# Patient Record
Sex: MALE | Race: BLACK OR AFRICAN AMERICAN | NOT HISPANIC OR LATINO | URBAN - METROPOLITAN AREA
[De-identification: names, ages, dates, MRNs, and addresses within clinical notes are randomized per-mention and may not be internally consistent; named-entity substitution may affect disease eponyms.]

---

## 2019-11-01 ENCOUNTER — CLINICAL SUPPORT (OUTPATIENT)
Dept: URGENT CARE | Facility: CLINIC | Age: 54
End: 2019-11-01

## 2019-11-01 DIAGNOSIS — Z02.89 ENCOUNTER FOR PHYSICAL EXAMINATION RELATED TO EMPLOYMENT: ICD-10-CM

## 2019-11-01 PROCEDURE — 99499 UNLISTED E&M SERVICE: CPT | Mod: S$GLB,,, | Performed by: EMERGENCY MEDICINE

## 2019-11-01 PROCEDURE — 99499 PR PHYSICAL - DOT/CDL: ICD-10-PCS | Mod: S$GLB,,, | Performed by: EMERGENCY MEDICINE

## 2019-11-20 NOTE — PROGRESS NOTES
This note was created by combination of typed  and M-Modal dictation.  Transcription errors may be present.  If there are any questions, please contact me.    Assessment & Plan:   Coronary artery disease involving native coronary artery of native heart with angina pectoris  -history of coronary artery disease status post stenting, lost to follow-up.  At the time of discharge he was not started on ACE-inhibitor, Cardiology did not feel it necessary at that time.  He is on a beta-blocker on statin, was on dual antiplatelet therapy.  He has stop the aspirin and stay on Plavix.  He can stop the Plavix and stay on aspirin  Refilled statin and metoprolol, referral to Cardiology for follow-up.  -     Ambulatory referral to Cardiology  -     aspirin (ECOTRIN) 81 MG EC tablet; Take 1 tablet (81 mg total) by mouth once daily.  Dispense: 30 tablet; Refill: 11  -     atorvastatin (LIPITOR) 40 MG tablet; Take 1 tablet (40 mg total) by mouth every evening.  Dispense: 90 tablet; Refill: 3  -     metoprolol succinate (TOPROL-XL) 25 MG 24 hr tablet; Take 1 tablet (25 mg total) by mouth once daily.  Dispense: 90 tablet; Refill: 3    Screening for colon cancer  Blood in stool  -reports positive fit kit, referral for colonoscopy  -     Case request GI: COLONOSCOPY    Smoker 1 PPD  -he and his fiancee both smoke, she is more committed to cessation at this time.  He has nicotine replacement therapy at home and advised him to set a quit date and try and quit together.  Nicotine patch and gum together has better quit rates then each individual.  -     CBC auto differential; Future; Expected date: 11/21/2019    Essential hypertension  -better on repeat.  No change, Toprol XL, HCTZ  -     CBC auto differential; Future; Expected date: 11/21/2019  -     metoprolol succinate (TOPROL-XL) 25 MG 24 hr tablet; Take 1 tablet (25 mg total) by mouth once daily.  Dispense: 90 tablet; Refill: 3  -     hydroCHLOROthiazide (HYDRODIURIL) 25 MG  tablet; TK 1 T PO ONE TIME PER DAY FOR BP  Dispense: 90 tablet; Refill: 3    Dyslipidemia  -fasting, check lipid profile on Lipitor 40  -     CBC auto differential; Future; Expected date: 11/21/2019  -     Comprehensive metabolic panel; Future; Expected date: 11/21/2019  -     Lipid panel; Future; Expected date: 11/21/2019  -     TSH; Future; Expected date: 11/21/2019  -     atorvastatin (LIPITOR) 40 MG tablet; Take 1 tablet (40 mg total) by mouth every evening.  Dispense: 90 tablet; Refill: 3    Need for hepatitis C screening test  -     Hepatitis C antibody; Future; Expected date: 11/21/2019    Cough  -smoker.  Denies constant sinus congestion/postnasal drip.  Denies dyspnea.  Nonproductive.  Check chest x-ray.  Denies dyspnea on exertion IE no symptomatic COPD symptoms  Work on smoking cessation.  -     X-Ray Chest PA And Lateral; Future; Expected date: 11/21/2019        Medications Discontinued During This Encounter   Medication Reason    clopidogrel (PLAVIX) 75 mg tablet Therapy completed    aspirin (ECOTRIN) 81 MG EC tablet Reorder    atorvastatin (LIPITOR) 40 MG tablet Reorder    metoprolol succinate (TOPROL-XL) 25 MG 24 hr tablet Reorder    hydroCHLOROthiazide (HYDRODIURIL) 25 MG tablet Reorder       meds sent this encounter:  Medications Ordered This Encounter   Medications    aspirin (ECOTRIN) 81 MG EC tablet     Sig: Take 1 tablet (81 mg total) by mouth once daily.     Dispense:  30 tablet     Refill:  11    atorvastatin (LIPITOR) 40 MG tablet     Sig: Take 1 tablet (40 mg total) by mouth every evening.     Dispense:  90 tablet     Refill:  3    hydroCHLOROthiazide (HYDRODIURIL) 25 MG tablet     Sig: TK 1 T PO ONE TIME PER DAY FOR BP     Dispense:  90 tablet     Refill:  3    metoprolol succinate (TOPROL-XL) 25 MG 24 hr tablet     Sig: Take 1 tablet (25 mg total) by mouth once daily.     Dispense:  90 tablet     Refill:  3       Follow Up: No follow-ups on file. OV 6 months. In the interim,  cardiology and colonoscopy    Subjective:     Chief Complaint   Patient presents with    Establish Care    Arm Pain    Cough       HPI  Luis Felipe is a 54 y.o. male, last appointment with this clinic was Visit date not found.    NP; prior Raegan Duenas with [facility]    Here with his marilee.     CAD/MI 12/2013 12/09/2013 left heart catheterization 95% stenosis midLCx, s/p stenting with premus element stent  12/10/2013 TTE LV normal size, systolic function normal.  LVEF 55-60%.  Normal diastolic function.  Smoker    Reviewed meds - taking plavix not ASA  On beta blocker  On statin  Lost to follow up with cardiology. And he would like to re-establish. Taking his meds - statin, hctz, metoprolol, taking plavix, not taking ASA.  No chest pain no dyspnea on exertion.     Intermittent cough. Smoker. 1 PPD. 20 years. Has nicotine patch at home.  Marilee smokes as well. She's ready to quit. He seems more in precontemplative stage. Rarely productive.     Reported positive iFOBT. Was referred to [facility]. Was without insurance. Never got the colonoscopy.     Recent injury on the job. Left shoulder. Discussed with him that b/c this occurred at work, he should proceed through workmen's compensation for further evaluation.     for [employer].    Patient Care Team:  El Carmona MD as PCP - General (Internal Medicine)  Raegan Duenas MD (Family Medicine)    Patient Active Problem List    Diagnosis Date Noted    Smoker 1 PPD 11/21/2019    Essential hypertension 11/21/2019    Dyslipidemia 11/21/2019    Coronary artery disease involving native coronary artery of native heart with angina pectoris 12/09/2013       PAST MEDICAL HISTORY:  Past Medical History:   Diagnosis Date    Hypertension        PAST SURGICAL HISTORY:  History reviewed. No pertinent surgical history.    Family History   Problem Relation Age of Onset    Peripheral vascular disease Mother     Heart disease Father     No Known  Problems Brother     No Known Problems Brother     No Known Problems Son     No Known Problems Son     No Known Problems Daughter     No Known Problems Daughter     No Known Problems Daughter        SOCIAL HISTORY:  Social History     Socioeconomic History    Marital status: Single     Spouse name: Not on file    Number of children: Not on file    Years of education: Not on file    Highest education level: Not on file   Occupational History    Occupation:      Employer: [employer]   Social Needs    Financial resource strain: Not on file    Food insecurity:     Worry: Not on file     Inability: Not on file    Transportation needs:     Medical: Not on file     Non-medical: Not on file   Tobacco Use    Smoking status: Current Every Day Smoker     Packs/day: 1.00     Years: 20.00     Pack years: 20.00     Types: Cigarettes    Smokeless tobacco: Never Used   Substance and Sexual Activity    Alcohol use: Yes     Alcohol/week: 3.0 standard drinks     Types: 3 Shots of liquor per week     Frequency: 2-4 times a month    Drug use: No    Sexual activity: Yes     Partners: Female   Lifestyle    Physical activity:     Days per week: Not on file     Minutes per session: Not on file    Stress: Not on file   Relationships    Social connections:     Talks on phone: Not on file     Gets together: Not on file     Attends Orthodox service: Not on file     Active member of club or organization: Not on file     Attends meetings of clubs or organizations: Not on file     Relationship status: Not on file   Other Topics Concern    Not on file   Social History Narrative    Not on file       ALLERGIES AND MEDICATIONS: updated and reviewed.  Review of patient's allergies indicates:  No Known Allergies  Current Outpatient Medications   Medication Sig Dispense Refill    atorvastatin (LIPITOR) 40 MG tablet Take 1 tablet (40 mg total) by mouth every evening. 30 tablet 11    hydroCHLOROthiazide  "(HYDRODIURIL) 25 MG tablet TK 1 T PO ONE TIME PER DAY FOR BP  1    metoprolol succinate (TOPROL-XL) 25 MG 24 hr tablet Take 1 tablet (25 mg total) by mouth once daily. 30 tablet 11    aspirin (ECOTRIN) 81 MG EC tablet Take 1 tablet (81 mg total) by mouth once daily. (Patient not taking: Reported on 11/21/2019) 30 tablet 11    clopidogrel (PLAVIX) 75 mg tablet Take 1 tablet (75 mg total) by mouth once daily. (Patient not taking: Reported on 11/21/2019) 30 tablet 11     No current facility-administered medications for this visit.        Review of Systems   Constitutional: Negative for chills and fever.   Respiratory: Positive for cough. Negative for hemoptysis, sputum production, shortness of breath and wheezing.    Cardiovascular: Negative for chest pain and palpitations.   Gastrointestinal: Negative for abdominal pain.   Genitourinary: Negative for dysuria.       Objective:   Physical Exam   Vitals:    11/21/19 1045   Weight: 94.8 kg (208 lb 15.9 oz)   Height: 5' 8" (1.727 m)    Body mass index is 31.78 kg/m².  Weight: 94.8 kg (208 lb 15.9 oz)   Height: 5' 8" (172.7 cm)     Physical Exam   Constitutional: He is oriented to person, place, and time. He appears well-developed and well-nourished. No distress.   Eyes: EOM are normal.   Neck: No thyromegaly present.   Cardiovascular: Normal rate, regular rhythm and normal heart sounds.   No murmur heard.  Pulmonary/Chest: Effort normal and breath sounds normal.   Musculoskeletal: He exhibits no edema or deformity.   Lymphadenopathy:     He has no cervical adenopathy.   Neurological: He is alert and oriented to person, place, and time. Coordination normal.   Skin: Skin is warm and dry.   Psychiatric: He has a normal mood and affect. His behavior is normal. Thought content normal.     "

## 2019-11-21 ENCOUNTER — HOSPITAL ENCOUNTER (OUTPATIENT)
Dept: RADIOLOGY | Facility: HOSPITAL | Age: 54
Discharge: HOME | End: 2019-11-21
Attending: INTERNAL MEDICINE
Payer: COMMERCIAL

## 2019-11-21 ENCOUNTER — OFFICE VISIT (OUTPATIENT)
Dept: FAMILY MEDICINE | Facility: CLINIC | Age: 54
End: 2019-11-21
Payer: COMMERCIAL

## 2019-11-21 VITALS
DIASTOLIC BLOOD PRESSURE: 80 MMHG | OXYGEN SATURATION: 96 % | BODY MASS INDEX: 31.67 KG/M2 | SYSTOLIC BLOOD PRESSURE: 130 MMHG | TEMPERATURE: 98 F | HEIGHT: 68 IN | WEIGHT: 209 LBS | HEART RATE: 79 BPM

## 2019-11-21 DIAGNOSIS — I25.119 CORONARY ARTERY DISEASE INVOLVING NATIVE CORONARY ARTERY OF NATIVE HEART WITH ANGINA PECTORIS: Primary | ICD-10-CM

## 2019-11-21 DIAGNOSIS — Z11.59 NEED FOR HEPATITIS C SCREENING TEST: ICD-10-CM

## 2019-11-21 DIAGNOSIS — R05.9 COUGH: ICD-10-CM

## 2019-11-21 DIAGNOSIS — E78.5 DYSLIPIDEMIA: ICD-10-CM

## 2019-11-21 DIAGNOSIS — F17.200 SMOKER: ICD-10-CM

## 2019-11-21 DIAGNOSIS — K92.1 BLOOD IN STOOL: ICD-10-CM

## 2019-11-21 DIAGNOSIS — Z12.11 SCREENING FOR COLON CANCER: ICD-10-CM

## 2019-11-21 DIAGNOSIS — I10 ESSENTIAL HYPERTENSION: ICD-10-CM

## 2019-11-21 PROCEDURE — 99999 PR PBB SHADOW E&M-EST. PATIENT-LVL IV: ICD-10-PCS | Mod: PBBFAC,,, | Performed by: INTERNAL MEDICINE

## 2019-11-21 PROCEDURE — 71046 XR CHEST PA AND LATERAL: ICD-10-PCS | Mod: 26,,, | Performed by: RADIOLOGY

## 2019-11-21 PROCEDURE — 99204 OFFICE O/P NEW MOD 45 MIN: CPT | Mod: S$GLB,,, | Performed by: INTERNAL MEDICINE

## 2019-11-21 PROCEDURE — 99204 PR OFFICE/OUTPT VISIT, NEW, LEVL IV, 45-59 MIN: ICD-10-PCS | Mod: S$GLB,,, | Performed by: INTERNAL MEDICINE

## 2019-11-21 PROCEDURE — 99999 PR PBB SHADOW E&M-EST. PATIENT-LVL IV: CPT | Mod: PBBFAC,,, | Performed by: INTERNAL MEDICINE

## 2019-11-21 PROCEDURE — 71046 X-RAY EXAM CHEST 2 VIEWS: CPT | Mod: TC,FY,PO

## 2019-11-21 PROCEDURE — 71046 X-RAY EXAM CHEST 2 VIEWS: CPT | Mod: 26,,, | Performed by: RADIOLOGY

## 2019-11-21 RX ORDER — HYDROCHLOROTHIAZIDE 25 MG/1
TABLET ORAL
Qty: 90 TABLET | Refills: 3 | Status: SHIPPED | OUTPATIENT
Start: 2019-11-21 | End: 2020-12-11 | Stop reason: SDUPTHER

## 2019-11-21 RX ORDER — HYDROCHLOROTHIAZIDE 25 MG/1
TABLET ORAL
Refills: 1 | COMMUNITY
Start: 2019-08-23 | End: 2019-11-21 | Stop reason: SDUPTHER

## 2019-11-21 RX ORDER — METOPROLOL SUCCINATE 25 MG/1
25 TABLET, EXTENDED RELEASE ORAL DAILY
Qty: 90 TABLET | Refills: 3 | Status: SHIPPED | OUTPATIENT
Start: 2019-11-21 | End: 2020-11-20

## 2019-11-21 RX ORDER — ASPIRIN 81 MG/1
81 TABLET ORAL DAILY
Qty: 30 TABLET | Refills: 11 | Status: SHIPPED | OUTPATIENT
Start: 2019-11-21 | End: 2020-11-20

## 2019-11-21 RX ORDER — ATORVASTATIN CALCIUM 40 MG/1
40 TABLET, FILM COATED ORAL NIGHTLY
Qty: 90 TABLET | Refills: 3 | Status: SHIPPED | OUTPATIENT
Start: 2019-11-21 | End: 2020-03-06

## 2019-11-22 ENCOUNTER — TELEPHONE (OUTPATIENT)
Dept: FAMILY MEDICINE | Facility: CLINIC | Age: 54
End: 2019-11-22

## 2019-11-22 ENCOUNTER — PES CALL (OUTPATIENT)
Dept: ADMINISTRATIVE | Facility: CLINIC | Age: 54
End: 2019-11-22

## 2019-11-22 DIAGNOSIS — E11.9 TYPE 2 DIABETES MELLITUS WITHOUT COMPLICATION, WITHOUT LONG-TERM CURRENT USE OF INSULIN: ICD-10-CM

## 2019-11-22 DIAGNOSIS — R91.8 HILAR MASS: Primary | ICD-10-CM

## 2019-11-22 DIAGNOSIS — R73.09 ABNORMAL GLUCOSE: Primary | ICD-10-CM

## 2019-11-22 NOTE — TELEPHONE ENCOUNTER
I left a voice message on his listed cell phone asking him to call back.  His chest x-ray was abnormal and I need to get a CT scan to see what it is.  I can't say what it is at this time, if it is an infection or something else.  His blood sugars were high.  I checked a hemoglobin A1c, a three-month average of his blood sugars and it was high, in the diabetes range.  I do know if his previous doctor had mentioned that to him or not.        When the patient is notified I will order the CT scan and I will refer him to diabetes education    Spoke with the pt and inform him of the recommendations.  Patient didn't have any questions.  Patient verbalized understandings.

## 2019-11-22 NOTE — TELEPHONE ENCOUNTER
I left a voice message on his listed cell phone asking him to call back.  His chest x-ray was abnormal and I need to get a CT scan to see what it is.  I can't say what it is at this time, if it is an infection or something else.  His blood sugars were high.  I checked a hemoglobin A1c, a three-month average of his blood sugars and it was high, in the diabetes range.  I do know if his previous doctor had mentioned that to him or not.      When the patient is notified I will order the CT scan and I will refer him to diabetes education.

## 2019-11-22 NOTE — PROGRESS NOTES
There is a soft tissue mass in the anterior segment of the right upper lobe that measures at least 4 cm in cranial-caudad extent as seen on lateral view.  Coronal extent is at least 4.5 cm on PA view; on PA view the lesion overlies the right hilum and right inter lobar artery.   no left pulmonary disease    Will need CT with IV contrast. Will notify pt

## 2019-11-27 ENCOUNTER — TELEPHONE (OUTPATIENT)
Dept: FAMILY MEDICINE | Facility: CLINIC | Age: 54
End: 2019-11-27

## 2019-11-27 NOTE — LETTER
November 27, 2019    Luis Felipe Hawk  [address]  [address] [place] [address]             [facility]  [address] [place] [address]  Phone: [phone]  Fax: [phone] Dear Mr. Hawk:    I have been unable to reach you by phone for your appointment to Cardiology. Please call me at the clinic [phone] to book your appointment.      If you have any questions or concerns, please don't hesitate to call.    Sincerely,        Margaux Acosta MA

## 2019-11-29 ENCOUNTER — TELEPHONE (OUTPATIENT)
Dept: FAMILY MEDICINE | Facility: CLINIC | Age: 54
End: 2019-11-29

## 2019-11-29 NOTE — TELEPHONE ENCOUNTER
----- Message from El Carmona MD sent at 11/29/2019  3:41 PM CST -----  Regarding: did pt schedule CT scan yet?   Needs CT chest, was ordered

## 2019-12-06 DIAGNOSIS — Z12.11 COLON CANCER SCREENING: ICD-10-CM

## 2019-12-06 DIAGNOSIS — E11.9 TYPE 2 DIABETES MELLITUS WITHOUT COMPLICATION: ICD-10-CM

## 2019-12-10 ENCOUNTER — TELEPHONE (OUTPATIENT)
Dept: FAMILY MEDICINE | Facility: CLINIC | Age: 54
End: 2019-12-10

## 2019-12-10 DIAGNOSIS — E11.9 TYPE 2 DIABETES MELLITUS WITHOUT COMPLICATION, WITHOUT LONG-TERM CURRENT USE OF INSULIN: Primary | ICD-10-CM

## 2019-12-10 NOTE — TELEPHONE ENCOUNTER
----- Message from Meghankristie Lamarfield sent at 12/10/2019  3:32 PM CST -----  Contact: Annabelle (Linda)  Name of Who is Calling: Annabelle Monsalve)      What is the request in detail: Would like to speak to staff in regards to wanting endocrinology referral sent over to Dr. Johnson's office. States the referral has to have Dr. Carmona's signature, diagnosis, and demographics sheet; Fax number: [phone]. Please advise.      Can the clinic reply by [facility]: No      What Number to Call Back if not in [facility]: [phone]; alt number: [phone]

## 2019-12-11 NOTE — TELEPHONE ENCOUNTER
----- Message from El Carmona MD sent at 12/10/2019  3:09 PM CST -----  Regarding: has he scheduled CT scan?  I had ordered CT scan.  Did he get it scheduled? Very important

## 2019-12-11 NOTE — TELEPHONE ENCOUNTER
Patient was advised that referral was faxed.    Wife stated that she is currently talking to the insurance company to see what place is covered to do CT scan.

## 2019-12-19 ENCOUNTER — TELEPHONE (OUTPATIENT)
Dept: FAMILY MEDICINE | Facility: CLINIC | Age: 54
End: 2019-12-19

## 2019-12-19 NOTE — TELEPHONE ENCOUNTER
Patient's wife was calling for refill on Plavix. Informed patient's wife; according to office visit in November 2019; Plavix was stopped and patient is not taking aspirin 81 mg daily.

## 2019-12-19 NOTE — TELEPHONE ENCOUNTER
----- Message from Radha Chicas sent at 12/19/2019 10:31 AM CST -----  Contact: pt wife. Annabelle  Type: Patient Call Back    Who called:annabelle    What is the request in detail:pt was told to call if he needs medication    Can the clinic reply by [facility]?no    Would the patient rather a call back or a response via [facility]? call    Best call back number:[phone]

## 2019-12-20 DIAGNOSIS — Z12.11 COLON CANCER SCREENING: Primary | ICD-10-CM

## 2020-01-20 RX ORDER — POLYETHYLENE GLYCOL 3350, SODIUM SULFATE ANHYDROUS, SODIUM BICARBONATE, SODIUM CHLORIDE, POTASSIUM CHLORIDE 236; 22.74; 6.74; 5.86; 2.97 G/4L; G/4L; G/4L; G/4L; G/4L
4 POWDER, FOR SOLUTION ORAL ONCE
Qty: 4000 ML | Refills: 0 | Status: SHIPPED | OUTPATIENT
Start: 2020-01-20 | End: 2020-01-20

## 2020-01-30 ENCOUNTER — TELEPHONE (OUTPATIENT)
Dept: FAMILY MEDICINE | Facility: CLINIC | Age: 55
End: 2020-01-30

## 2020-01-30 NOTE — TELEPHONE ENCOUNTER
----- Message from Lamont Thompson sent at 1/30/2020 10:08 AM CST -----  Contact: Self  Type:  Pre-Op Appt    Patient is requesting a pre op appt.      Name of Caller:  Self    Preferred Appt Date and Time:  02/03/2020    When is the surgery date? 02/05/2020    Type of Surgery: Rotator cuff    Would the patient rather a call back or a response via [facility]? Call     Best Call Back Number:  [phone]

## 2020-01-31 DIAGNOSIS — E11.9 TYPE 2 DIABETES MELLITUS WITHOUT COMPLICATION, UNSPECIFIED WHETHER LONG TERM INSULIN USE: ICD-10-CM

## 2020-02-03 ENCOUNTER — DOCUMENTATION ONLY (OUTPATIENT)
Dept: FAMILY MEDICINE | Facility: CLINIC | Age: 55
End: 2020-02-03

## 2020-02-04 ENCOUNTER — TELEPHONE (OUTPATIENT)
Dept: FAMILY MEDICINE | Facility: CLINIC | Age: 55
End: 2020-02-04

## 2020-02-04 NOTE — TELEPHONE ENCOUNTER
----- Message from Shyann Krystina sent at 2/4/2020  1:00 PM CST -----  Type: Patient Call Back    Who called: Sil Nurse  for Workman's comp    What is the request in detail: Rep asking for a call back to discuss pt's lab work and possible pre op appt if needed.     Can the clinic reply by [facility]? No     Would the patient rather a call back or a response via [facility]? Call back     Best call back number: [phone]    Additional Information:

## 2020-02-04 NOTE — PROGRESS NOTES
I received notice that the patient is planning to undergo left shoulder arthroscopy February 5th through Dr. Kayode Garvey.      There asking for preoperative clearance  I spoke with the wife.  Unfortunate because this is workman's compensation, he was due to see me for visit today but had to cancel because it was not covered.  She spoke with workman's compensation and they will make arrangements for him to see internal Medicine/Family Medicine for preoperative risk stratification.  The surgery has been postponed.

## 2020-02-08 ENCOUNTER — TELEPHONE (OUTPATIENT)
Dept: FAMILY MEDICINE | Facility: CLINIC | Age: 55
End: 2020-02-08

## 2020-02-08 NOTE — TELEPHONE ENCOUNTER
----- Message from Mitesh Jackson sent at 2/7/2020  4:47 PM CST -----  Contact: Nohemi- Case Management worker  Type: Patient Call Back    Who called: Sil    What is the request in detail: She just needs confirmation a fax was received for patient. Please advise.    Can the clinic reply by [facility]? No    Would the patient rather a call back or a response via [facility]? Call    Best call back number: [phone]    Additional Information: N/A

## 2020-02-13 ENCOUNTER — TELEPHONE (OUTPATIENT)
Dept: FAMILY MEDICINE | Facility: CLINIC | Age: 55
End: 2020-02-13

## 2020-02-13 NOTE — TELEPHONE ENCOUNTER
----- Message from Nellie Burgos sent at 2/13/2020  9:15 AM CST -----  Contact: fernanda with worker's comp case expert [phone]  Type: Patient Call Back    Who called:fernanda with worker's comp case expert [phone]    What is the request in detail:returning call to nurse. Call fernanda.    Can the clinic reply by [facility]?    Would the patient rather a call back or a response via [facility]? call    Best call back number:fernanda with worker's comp case expert [phone]    Additional Information:

## 2020-02-13 NOTE — TELEPHONE ENCOUNTER
----- Message from Venecia Juarez sent at 2/12/2020  1:45 PM CST -----  Contact: [facility]  [phone]  .Type: Patient Call Back    Who called Sil  Employee Health     What is the request in detail: A medical Record request was sent several time and need to verify that it was received to clear the pt for medical     Can the clinic reply by [facility]? Call back     Would the patient rather a call back or a response via [facility]? Call back     Best call back number: [phone]

## 2020-02-13 NOTE — TELEPHONE ENCOUNTER
Attempted to call fernanda back. No answer. LVM stating that patient wasn't seen recently. He had an worker's comp case which patient was seen some place else.

## 2020-02-13 NOTE — TELEPHONE ENCOUNTER
States that they faxed over a HECTOR to the clinic and would needs to have it faxed back. Notified her that I have received the fax and faxed it back.

## 2020-03-05 NOTE — PROGRESS NOTES
This note was created by combination of typed  and M-Modal dictation.  Transcription errors may be present.  If there are any questions, please contact me.    Assessment & Plan:   Type 2 diabetes mellitus without complication, without long-term current use of insulin  -discussed this diagnosis that he does indeed have new onset diabetes with uncontrolled A1c.  He has an upcoming visit with diabetes nurse practitioner.  But I will refer him for diabetes education.  Really needs to work on cutting out sweet drinks and sweet tea and soda.  Also and active smoker which will compound this as well.  Glucose testing kit sent.  Foot exam done today  Eye camera today  -     Ambulatory referral/consult to Diabetes Education; Future; Expected date: 03/06/2020  -     Diabetic Eye Screening Photo; Future; Expected date: 03/06/2020  -     blood-glucose meter kit; To check BG 1 times daily, to use with insurance preferred meter  Dispense: 1 each; Refill: 0    Lung nodule  Smoker 1 PPD  Clubbing of nail  -CT was previously ordered but was delayed by insurance issues. Now with new insurance and I will have staff schedule this.  Radiology had recommended CT with iv contrast.  Pre-contemplative stage of quitting.    Coronary artery disease involving native coronary artery of native heart with angina pectoris 2013 [facility] and stent mLCx  Dyslipidemia  -check lipid on statin.  -     CBC auto differential; Future; Expected date: 03/06/2020  -     Comprehensive metabolic panel; Future; Expected date: 03/06/2020  -     Lipid panel; Future; Expected date: 03/06/2020    Essential hypertension  -OK control for now no changes.    Encounter for screening for HIV  -routine screening  -     HIV 1/2 Ag/Ab (4th Gen); Future; Expected date: 03/06/2020    Screening for colon cancer  -for hx of positive fitkit remotely.  -     Case request GI: COLONOSCOPY    Needs flu shot  -     Influenza - Quadrivalent (6 months+) (PF)    Need for vaccination  for Strep pneumoniae  -     (In Office Administered) Pneumococcal Polysaccharide Vaccine (23 Valent) (SQ/IM)    Wheezing  -smoker.  Abn CXR. Trial of albuterol.  -     albuterol (PROVENTIL/VENTOLIN HFA) 90 mcg/actuation inhaler; Inhale 2 puffs into the lungs every 6 (six) hours as needed for Wheezing. Whichever brand covered by insurance  Dispense: 18 g; Refill: 0        Medications Discontinued During This Encounter   Medication Reason    atorvastatin (LIPITOR) 40 MG tablet        meds sent this encounter:  Medications Ordered This Encounter   Medications    albuterol (PROVENTIL/VENTOLIN HFA) 90 mcg/actuation inhaler     Sig: Inhale 2 puffs into the lungs every 6 (six) hours as needed for Wheezing. Whichever brand covered by insurance     Dispense:  18 g     Refill:  0    blood-glucose meter kit     Sig: To check BG 1 times daily, to use with insurance preferred meter     Dispense:  1 each     Refill:  0       Follow Up: No follow-ups on file.    Subjective:     Chief Complaint   Patient presents with    Wheezing    Chest Congestion    Gastroesophageal Reflux     burning sensation in throat       HPI  Luis Felipe is a 54 y.o. male, last appointment with this clinic was 11/21/2019.    I previously saw him back in November as a new patient.  Coronary artery disease with history of stenting.  Hypertension and hyperlipidemia.  I had referred him to Cardiology.  Statin, metoprolol, aspirin.  Reportedly positive fit kit and so I referred him for a colonoscopy.  Smoker  New diagnosis of diabetes with an A1c of 8.8.  Abnormal chest x-ray with soft tissue mass in the anterior segment of the right upper lobe.    Unfortunately he was experiencing insurance issues thereafter.  Apparently he had some sort of work related injury for which he was using workman's compensation for.  And so he was not able to get the colonoscopy done nor was able to get the CT scan done.    He has an appointment scheduled for diabetes nurse  practitioner    Because of his insurance issues he could not schedule follow-up with me to address these issues.    Now with different insurance.     He is here accompanied by wife.     We discussed his high a1c.  Wife notes that he is in denial. I discussed with patient today that he is indeed diabetic with an a1c that is uncontrolled.   Since last visit has made no dietary modifications.   Limited water.  Lots of Tea and coca cola.     Smoking, precontemplative stage of quitting.   C/o congestion and wheezing.  Longstanding.     Patient Care Team:  El Carmona MD as PCP - General (Internal Medicine)  ANDRE Guerrero (Family Medicine)  Kayode Garvey Jr., MD as Consulting Physician (Orthopedic Surgery)    Patient Active Problem List    Diagnosis Date Noted    Type 2 diabetes mellitus without complication, without long-term current use of insulin 11/22/2019    Smoker 1 PPD 11/21/2019    Essential hypertension 11/21/2019    Dyslipidemia 11/21/2019    Coronary artery disease involving native coronary artery of native heart with angina pectoris 2013 [facility] and stent mLCx 12/09/2013 12/09/2013 left heart catheterization 95% stenosis midLCx, s/p stenting with premus element stent  12/10/2013 TTE LV normal size, systolic function normal.  LVEF 55-60%.  Normal diastolic function.         PAST MEDICAL HISTORY:  Past Medical History:   Diagnosis Date    Hypertension        PAST SURGICAL HISTORY:  History reviewed. No pertinent surgical history.    SOCIAL HISTORY:  Social History     Socioeconomic History    Marital status: Single     Spouse name: Not on file    Number of children: Not on file    Years of education: Not on file    Highest education level: Not on file   Occupational History    Occupation:      Employer: [employer]   Social Needs    Financial resource strain: Not on file    Food insecurity:     Worry: Not on file     Inability: Not on file    Transportation needs:      Medical: Not on file     Non-medical: Not on file   Tobacco Use    Smoking status: Current Every Day Smoker     Packs/day: 1.00     Years: 20.00     Pack years: 20.00     Types: Cigarettes    Smokeless tobacco: Never Used   Substance and Sexual Activity    Alcohol use: Yes     Alcohol/week: 3.0 standard drinks     Types: 3 Shots of liquor per week     Frequency: 2-4 times a month    Drug use: No    Sexual activity: Yes     Partners: Female   Lifestyle    Physical activity:     Days per week: Not on file     Minutes per session: Not on file    Stress: Not on file   Relationships    Social connections:     Talks on phone: Not on file     Gets together: Not on file     Attends Scientologist service: Not on file     Active member of club or organization: Not on file     Attends meetings of clubs or organizations: Not on file     Relationship status: Not on file   Other Topics Concern    Not on file   Social History Narrative    Not on file       ALLERGIES AND MEDICATIONS: updated and reviewed.  Review of patient's allergies indicates:  No Known Allergies  Current Outpatient Medications   Medication Sig Dispense Refill    aspirin (ECOTRIN) 81 MG EC tablet Take 1 tablet (81 mg total) by mouth once daily. 30 tablet 11    atorvastatin (LIPITOR) 80 MG tablet Take 80 mg by mouth.      hydroCHLOROthiazide (HYDRODIURIL) 25 MG tablet TK 1 T PO ONE TIME PER DAY FOR BP 90 tablet 3    metoprolol succinate (TOPROL-XL) 25 MG 24 hr tablet Take 1 tablet (25 mg total) by mouth once daily. 90 tablet 3     No current facility-administered medications for this visit.        Review of Systems   Constitutional: Negative for chills and fever.   Respiratory: Positive for shortness of breath and wheezing.         Wheezing at night with lying down   Cardiovascular: Negative for chest pain.       Objective:   Physical Exam   Vitals:    03/06/20 1115 03/06/20 1152   BP: (!) 148/94 136/84   BP Location: Right arm Left arm   Patient  "Position: Sitting Sitting   BP Method: Medium (Manual) Large (Manual)   Pulse: 86    Temp: 98.1 °F (36.7 °C)    TempSrc: Oral    SpO2: 97%    Weight: 93.4 kg (206 lb)    Height: 5' 8" (1.727 m)     Body mass index is 31.32 kg/m².  Weight: 93.4 kg (206 lb)   Height: 5' 8" (172.7 cm)     Physical Exam   Constitutional: He is oriented to person, place, and time. He appears well-developed and well-nourished. No distress.   Eyes: EOM are normal.   Cardiovascular: Normal rate, regular rhythm and normal heart sounds.   No murmur heard.  Pulses:       Dorsalis pedis pulses are 3+ on the right side, and 3+ on the left side.        Posterior tibial pulses are 3+ on the right side, and 3+ on the left side.   Pulmonary/Chest: Effort normal and breath sounds normal.   Musculoskeletal: Normal range of motion. He exhibits no edema.        Right foot: There is no deformity.        Left foot: There is no deformity.   Feet:   Right Foot:   Protective Sensation: 5 sites tested. 5 sites sensed.   Skin Integrity: Negative for ulcer, blister, skin breakdown, erythema or warmth.   Left Foot:   Protective Sensation: 5 sites tested. 5 sites sensed.   Skin Integrity: Negative for ulcer, blister, skin breakdown, erythema or warmth.   Neurological: He is alert and oriented to person, place, and time. Coordination normal.   Skin: Skin is warm and dry.   Clubbing in the nails   Psychiatric: He has a normal mood and affect. His behavior is normal. Thought content normal.     "

## 2020-03-06 ENCOUNTER — CLINICAL SUPPORT (OUTPATIENT)
Dept: OPHTHALMOLOGY | Facility: CLINIC | Age: 55
End: 2020-03-06
Attending: INTERNAL MEDICINE
Payer: COMMERCIAL

## 2020-03-06 ENCOUNTER — LAB VISIT (OUTPATIENT)
Dept: LAB | Facility: HOSPITAL | Age: 55
End: 2020-03-06
Attending: INTERNAL MEDICINE
Payer: COMMERCIAL

## 2020-03-06 ENCOUNTER — TELEPHONE (OUTPATIENT)
Dept: FAMILY MEDICINE | Facility: CLINIC | Age: 55
End: 2020-03-06

## 2020-03-06 ENCOUNTER — OFFICE VISIT (OUTPATIENT)
Dept: FAMILY MEDICINE | Facility: CLINIC | Age: 55
End: 2020-03-06
Payer: COMMERCIAL

## 2020-03-06 VITALS
WEIGHT: 206 LBS | HEIGHT: 68 IN | SYSTOLIC BLOOD PRESSURE: 136 MMHG | HEART RATE: 86 BPM | TEMPERATURE: 98 F | OXYGEN SATURATION: 97 % | DIASTOLIC BLOOD PRESSURE: 84 MMHG | BODY MASS INDEX: 31.22 KG/M2

## 2020-03-06 DIAGNOSIS — E11.9 TYPE 2 DIABETES MELLITUS WITHOUT COMPLICATION, WITHOUT LONG-TERM CURRENT USE OF INSULIN: ICD-10-CM

## 2020-03-06 DIAGNOSIS — E11.9 TYPE 2 DIABETES MELLITUS WITHOUT COMPLICATION, WITHOUT LONG-TERM CURRENT USE OF INSULIN: Primary | ICD-10-CM

## 2020-03-06 DIAGNOSIS — R06.2 WHEEZING: ICD-10-CM

## 2020-03-06 DIAGNOSIS — F17.200 SMOKER: ICD-10-CM

## 2020-03-06 DIAGNOSIS — Z11.4 ENCOUNTER FOR SCREENING FOR HIV: ICD-10-CM

## 2020-03-06 DIAGNOSIS — R68.3 CLUBBING OF NAIL: ICD-10-CM

## 2020-03-06 DIAGNOSIS — I25.119 CORONARY ARTERY DISEASE INVOLVING NATIVE CORONARY ARTERY OF NATIVE HEART WITH ANGINA PECTORIS: ICD-10-CM

## 2020-03-06 DIAGNOSIS — R91.1 LUNG NODULE: ICD-10-CM

## 2020-03-06 DIAGNOSIS — I10 ESSENTIAL HYPERTENSION: ICD-10-CM

## 2020-03-06 DIAGNOSIS — Z23 NEED FOR VACCINATION FOR STREP PNEUMONIAE: ICD-10-CM

## 2020-03-06 DIAGNOSIS — Z12.11 SCREENING FOR COLON CANCER: ICD-10-CM

## 2020-03-06 DIAGNOSIS — E78.5 DYSLIPIDEMIA: ICD-10-CM

## 2020-03-06 DIAGNOSIS — Z23 NEEDS FLU SHOT: ICD-10-CM

## 2020-03-06 LAB
ALBUMIN SERPL BCP-MCNC: 3.8 G/DL (ref 3.5–5.2)
ALP SERPL-CCNC: 124 U/L (ref 55–135)
ALT SERPL W/O P-5'-P-CCNC: 36 U/L (ref 10–44)
ANION GAP SERPL CALC-SCNC: 8 MMOL/L (ref 8–16)
AST SERPL-CCNC: 28 U/L (ref 10–40)
BASOPHILS # BLD AUTO: 0.03 K/UL (ref 0–0.2)
BASOPHILS NFR BLD: 0.4 % (ref 0–1.9)
BILIRUB SERPL-MCNC: 0.5 MG/DL (ref 0.1–1)
BUN SERPL-MCNC: 12 MG/DL (ref 6–20)
CALCIUM SERPL-MCNC: 10 MG/DL (ref 8.7–10.5)
CHLORIDE SERPL-SCNC: 100 MMOL/L (ref 95–110)
CHOLEST SERPL-MCNC: 165 MG/DL (ref 120–199)
CHOLEST/HDLC SERPL: 5.2 {RATIO} (ref 2–5)
CO2 SERPL-SCNC: 26 MMOL/L (ref 23–29)
CREAT SERPL-MCNC: 1 MG/DL (ref 0.5–1.4)
DIFFERENTIAL METHOD: NORMAL
EOSINOPHIL # BLD AUTO: 0.1 K/UL (ref 0–0.5)
EOSINOPHIL NFR BLD: 1.3 % (ref 0–8)
ERYTHROCYTE [DISTWIDTH] IN BLOOD BY AUTOMATED COUNT: 13.3 % (ref 11.5–14.5)
EST. GFR  (AFRICAN AMERICAN): >60 ML/MIN/1.73 M^2
EST. GFR  (NON AFRICAN AMERICAN): >60 ML/MIN/1.73 M^2
GLUCOSE SERPL-MCNC: 242 MG/DL (ref 70–110)
HCT VFR BLD AUTO: 47.5 % (ref 40–54)
HDLC SERPL-MCNC: 32 MG/DL (ref 40–75)
HDLC SERPL: 19.4 % (ref 20–50)
HGB BLD-MCNC: 15.5 G/DL (ref 14–18)
IMM GRANULOCYTES # BLD AUTO: 0.01 K/UL (ref 0–0.04)
IMM GRANULOCYTES NFR BLD AUTO: 0.1 % (ref 0–0.5)
LDLC SERPL CALC-MCNC: 103.4 MG/DL (ref 63–159)
LYMPHOCYTES # BLD AUTO: 2.8 K/UL (ref 1–4.8)
LYMPHOCYTES NFR BLD: 35.9 % (ref 18–48)
MCH RBC QN AUTO: 28.9 PG (ref 27–31)
MCHC RBC AUTO-ENTMCNC: 32.6 G/DL (ref 32–36)
MCV RBC AUTO: 89 FL (ref 82–98)
MONOCYTES # BLD AUTO: 0.8 K/UL (ref 0.3–1)
MONOCYTES NFR BLD: 10.4 % (ref 4–15)
NEUTROPHILS # BLD AUTO: 4 K/UL (ref 1.8–7.7)
NEUTROPHILS NFR BLD: 51.9 % (ref 38–73)
NONHDLC SERPL-MCNC: 133 MG/DL
NRBC BLD-RTO: 0 /100 WBC
PLATELET # BLD AUTO: 238 K/UL (ref 150–350)
PMV BLD AUTO: 10.8 FL (ref 9.2–12.9)
POTASSIUM SERPL-SCNC: 4.4 MMOL/L (ref 3.5–5.1)
PROT SERPL-MCNC: 7.8 G/DL (ref 6–8.4)
RBC # BLD AUTO: 5.37 M/UL (ref 4.6–6.2)
SODIUM SERPL-SCNC: 134 MMOL/L (ref 136–145)
TRIGL SERPL-MCNC: 148 MG/DL (ref 30–150)
WBC # BLD AUTO: 7.77 K/UL (ref 3.9–12.7)

## 2020-03-06 PROCEDURE — 90471 FLU VACCINE (QUAD) GREATER THAN OR EQUAL TO 3YO PRESERVATIVE FREE IM: ICD-10-PCS | Mod: S$GLB,,, | Performed by: INTERNAL MEDICINE

## 2020-03-06 PROCEDURE — 90686 FLU VACCINE (QUAD) GREATER THAN OR EQUAL TO 3YO PRESERVATIVE FREE IM: ICD-10-PCS | Mod: S$GLB,,, | Performed by: INTERNAL MEDICINE

## 2020-03-06 PROCEDURE — 90472 IMMUNIZATION ADMIN EACH ADD: CPT | Mod: S$GLB,,, | Performed by: INTERNAL MEDICINE

## 2020-03-06 PROCEDURE — 99999 PR PBB SHADOW E&M-EST. PATIENT-LVL IV: ICD-10-PCS | Mod: PBBFAC,,, | Performed by: INTERNAL MEDICINE

## 2020-03-06 PROCEDURE — 99999 PR PBB SHADOW E&M-EST. PATIENT-LVL IV: CPT | Mod: PBBFAC,,, | Performed by: INTERNAL MEDICINE

## 2020-03-06 PROCEDURE — 86703 HIV-1/HIV-2 1 RESULT ANTBDY: CPT

## 2020-03-06 PROCEDURE — 90732 PPSV23 VACC 2 YRS+ SUBQ/IM: CPT | Mod: S$GLB,,, | Performed by: INTERNAL MEDICINE

## 2020-03-06 PROCEDURE — 90471 IMMUNIZATION ADMIN: CPT | Mod: S$GLB,,, | Performed by: INTERNAL MEDICINE

## 2020-03-06 PROCEDURE — 80053 COMPREHEN METABOLIC PANEL: CPT

## 2020-03-06 PROCEDURE — 99214 PR OFFICE/OUTPT VISIT, EST, LEVL IV, 30-39 MIN: ICD-10-PCS | Mod: 25,S$GLB,, | Performed by: INTERNAL MEDICINE

## 2020-03-06 PROCEDURE — 85025 COMPLETE CBC W/AUTO DIFF WBC: CPT

## 2020-03-06 PROCEDURE — 90686 IIV4 VACC NO PRSV 0.5 ML IM: CPT | Mod: S$GLB,,, | Performed by: INTERNAL MEDICINE

## 2020-03-06 PROCEDURE — 92250 DIABETIC EYE SCREENING PHOTO: ICD-10-PCS | Mod: 26,S$GLB,, | Performed by: OPHTHALMOLOGY

## 2020-03-06 PROCEDURE — 90732 PNEUMOCOCCAL POLYSACCHARIDE VACCINE 23-VALENT =>2YO SQ IM: ICD-10-PCS | Mod: S$GLB,,, | Performed by: INTERNAL MEDICINE

## 2020-03-06 PROCEDURE — 80061 LIPID PANEL: CPT

## 2020-03-06 PROCEDURE — 92250 FUNDUS PHOTOGRAPHY W/I&R: CPT | Mod: 26,S$GLB,, | Performed by: OPHTHALMOLOGY

## 2020-03-06 PROCEDURE — 99214 OFFICE O/P EST MOD 30 MIN: CPT | Mod: 25,S$GLB,, | Performed by: INTERNAL MEDICINE

## 2020-03-06 PROCEDURE — 36415 COLL VENOUS BLD VENIPUNCTURE: CPT | Mod: PO

## 2020-03-06 PROCEDURE — 90472 PNEUMOCOCCAL POLYSACCHARIDE VACCINE 23-VALENT =>2YO SQ IM: ICD-10-PCS | Mod: S$GLB,,, | Performed by: INTERNAL MEDICINE

## 2020-03-06 RX ORDER — ALBUTEROL SULFATE 90 UG/1
2 AEROSOL, METERED RESPIRATORY (INHALATION) EVERY 6 HOURS PRN
Qty: 18 G | Refills: 0 | Status: SHIPPED | OUTPATIENT
Start: 2020-03-06 | End: 2020-07-05

## 2020-03-06 RX ORDER — ATORVASTATIN CALCIUM 80 MG/1
80 TABLET, FILM COATED ORAL
COMMUNITY
End: 2020-04-07 | Stop reason: SDUPTHER

## 2020-03-06 RX ORDER — INSULIN PUMP SYRINGE, 3 ML
EACH MISCELLANEOUS
Qty: 1 EACH | Refills: 0 | Status: SHIPPED | OUTPATIENT
Start: 2020-03-06 | End: 2021-04-15

## 2020-03-06 NOTE — TELEPHONE ENCOUNTER
Spoke with Annabelle and the patient has eaten today.  She states the lab person told them she was going to do them a favor and re do the labs.  I can't determine which labs were drawn.  I explain to the patient's wife, that Im not sure what happen but I will sent  a message.  Patient will be returning to do his eye cam.  She would like to do the fasting labs tomorrow.  Please advise

## 2020-03-06 NOTE — PROGRESS NOTES
HPI     53 Y/o here for screening for diabetic retinopathy with non-dilated   fundus photos per Dr. Carmona    Last edited by Kvng Acharya MA on 3/6/2020  2:12 PM. (History)            Assessment /Plan     For exam results, see Encounter Report.    Type 2 diabetes mellitus without complication, without long-term current use of insulin  -     Diabetic Eye Screening Photo      Please see Dr. Roth progress note for interpretation

## 2020-03-06 NOTE — PROGRESS NOTES
Patient received Pneumococcal 23 vaccine and flu vaccine IM tolerated it well. Patient received VIS information.

## 2020-03-06 NOTE — TELEPHONE ENCOUNTER
Needs to have labs done at the hospital because I need creatinine for the CT scan which is scheduled for later today.  I reordered all the labs except for the lipid profile.  And the urine microalbumin.

## 2020-03-06 NOTE — TELEPHONE ENCOUNTER
----- Message from Evie Juarez sent at 3/6/2020 12:26 PM CST -----  Contact: Wife Annabelle Ponce [phone]  Type:  Patient Returning Call    Who Called: Wife Annabelle Ponce    Who Left Message for Patient: Maria Eugenia    Does the patient know what this is regarding?: Blood work    Would the patient rather a call back or a response via [facility]? Call back    Best Call Back Number: [phone]

## 2020-03-06 NOTE — TELEPHONE ENCOUNTER
Spoke with the patient's wife and told her to have the creatinine lab drawn at the hospital, before the Ultra sound is done.  I told Annabelle, that  is not concerned about the fasting prior to labs this morning.  I told her to please return to the clinic to have the eye cam done and go to the hospital in [place].  Patient verbalized understandings.

## 2020-03-06 NOTE — PATIENT INSTRUCTIONS
Managing Diabetes: The A1C Test       Healthy red blood cells have some glucose stuck to them. A high A1C means that unhealthy amounts of glucose are stuck to the cells.   What is the A1C test?  Using your meter helps you track your blood sugar every day. But your glucose meter tells you the value at the time of testing only. You also need to know if your treatment plan is keeping you healthy over time. The hemoglobin A1C (or glycated hemoglobin) test can help. This test measures your average blood sugar level over a few months. A higher A1C result means that you have a higher risk of developing complications.  The A1C test  The A1C is a blood test done by your healthcare provider. You will likely have an A1C test every 3 to 6 months.  Your blood glucose goal  A1C has been shown as a percentage. But it can also be shown as a number representing the estimated Average Glucose (eAG). Unlike the A1C percentage, eAG is a number similar to the numbers listed on your daily glucose monitor. Both A1C and eAG measure the amount of glucose stuck to a protein called hemoglobin in red blood cells. Your healthcare provider will help you figure out what your ideal A1C or eAG should be. Your target number will depend on your age, general health, and other factors. If your current number is too high, your treatment plan may need changes, such as different medicines.  Sample results  Most people aim for an A1c lower than 7%. Thats an eAG less than 154 mg/dL. Or, your healthcare provider may want you to aim for an A1C of 6%. Thats an eAG of 126 mg/dL.     Glucose calculator  Visit http://professional.diabetes.org/diapro/glucose_calc for a chart that helps convert your A1C percentages into eAG numbers.   Date Last Reviewed: 6/1/2016  © 3381-6294 [employer]. [address] [place] [address], [place] [address]. All rights reserved. This information is not intended as a substitute for professional medical care. Always follow your  healthcare professional's instructions.

## 2020-03-09 LAB — HIV 1+2 AB+HIV1 P24 AG SERPL QL IA: NEGATIVE

## 2020-03-11 ENCOUNTER — TELEPHONE (OUTPATIENT)
Dept: OPHTHALMOLOGY | Facility: CLINIC | Age: 55
End: 2020-03-11

## 2020-03-13 ENCOUNTER — HOSPITAL ENCOUNTER (OUTPATIENT)
Dept: RADIOLOGY | Facility: HOSPITAL | Age: 55
Discharge: HOME | End: 2020-03-13
Attending: INTERNAL MEDICINE
Payer: COMMERCIAL

## 2020-03-13 PROCEDURE — 71260 CT CHEST WITH CONTRAST: ICD-10-PCS | Mod: 26,,, | Performed by: RADIOLOGY

## 2020-03-13 PROCEDURE — 71260 CT THORAX DX C+: CPT | Mod: TC

## 2020-03-13 PROCEDURE — 25500020 PHARM REV CODE 255: Performed by: INTERNAL MEDICINE

## 2020-03-13 PROCEDURE — 71260 CT THORAX DX C+: CPT | Mod: 26,,, | Performed by: RADIOLOGY

## 2020-03-13 RX ADMIN — IOHEXOL 85 ML: 350 INJECTION, SOLUTION INTRAVENOUS at 02:03

## 2020-03-16 ENCOUNTER — TELEPHONE (OUTPATIENT)
Dept: FAMILY MEDICINE | Facility: CLINIC | Age: 55
End: 2020-03-16

## 2020-03-16 ENCOUNTER — TELEPHONE (OUTPATIENT)
Dept: DIABETES | Facility: CLINIC | Age: 55
End: 2020-03-16

## 2020-03-16 DIAGNOSIS — R91.8 MASS OF UPPER LOBE OF RIGHT LUNG: Primary | ICD-10-CM

## 2020-03-16 DIAGNOSIS — E11.9 TYPE 2 DIABETES MELLITUS WITHOUT COMPLICATION, WITHOUT LONG-TERM CURRENT USE OF INSULIN: Primary | ICD-10-CM

## 2020-03-16 RX ORDER — METFORMIN HYDROCHLORIDE 500 MG/1
TABLET ORAL
Qty: 120 TABLET | Refills: 5 | Status: SHIPPED | OUTPATIENT
Start: 2020-03-16 | End: 2020-12-22

## 2020-03-16 NOTE — TELEPHONE ENCOUNTER
Called pt and left message to call back.  His CT scan was abnormal and showed a mass in the right upper area.  I need to get a biopsy.     I left VM on listed cell phone asking him to call back. When he calls back I will submit referral to IR

## 2020-03-16 NOTE — TELEPHONE ENCOUNTER
Called pt to inform him of insurance denial of services for diabetes education. Pt stated would contact insurance and discuss options and will reschedule appt at that time

## 2020-03-17 NOTE — TELEPHONE ENCOUNTER
Please call the patient-his blood sugars are very high.  Work on cutting down soda.  But I will start him on metformin. Start with 1 tablet a day and every week increase the dose to a target dose of 2 pills twice a day.    I have ordered a CT-guided biopsy of his lung.  He was notified in previous phone message about his abn lung CT and the need for biopsy.

## 2020-03-17 NOTE — TELEPHONE ENCOUNTER
----- Message from Shannon Goodwin sent at 3/17/2020  9:20 AM CDT -----  Contact: Self   Type: Patient Call Back     What is the request in detail: Pt requesting to speak to a nurse regarding CT results.     Can the clinic reply by [facility]? No     Would the patient rather a call back or a response via [facility]? Call back     Best call back number: [phone]

## 2020-03-17 NOTE — TELEPHONE ENCOUNTER
Please call the patient-his blood sugars are very high.  Work on cutting down soda.  But I will start him on metformin. Start with 1 tablet a day and every week increase the dose to a target dose of 2 pills twice a day.     I have ordered a CT-guided biopsy of his lung.  He was notified in previous phone message about his abn lung CT and the need for biopsy.    Spoke with patient and informed of recommendations and patient verbalized understandings.    Patient states he had his Ct.  jj

## 2020-03-20 ENCOUNTER — NURSE TRIAGE (OUTPATIENT)
Dept: ADMINISTRATIVE | Facility: CLINIC | Age: 55
End: 2020-03-20

## 2020-03-20 NOTE — TELEPHONE ENCOUNTER
Mass in his right chest on CT scan since 3/13/2020. Biopsy was supposed to be scheduled in a week. Will send high priority message to Dr. Carmona to see when and if the biopsy will be scheduled at this time.  Patient and wife verbalized understanding.     Reason for Disposition   Question about upcoming scheduled test, no triage required and triager able to answer question    Protocols used: ST INFORMATION ONLY CALL-A-AH

## 2020-03-23 ENCOUNTER — TELEPHONE (OUTPATIENT)
Dept: FAMILY MEDICINE | Facility: CLINIC | Age: 55
End: 2020-03-23

## 2020-03-23 DIAGNOSIS — R91.8 MASS OF UPPER LOBE OF RIGHT LUNG: ICD-10-CM

## 2020-03-23 DIAGNOSIS — G89.29 CHRONIC SHOULDER PAIN, UNSPECIFIED LATERALITY: Primary | ICD-10-CM

## 2020-03-23 DIAGNOSIS — M25.519 CHRONIC SHOULDER PAIN, UNSPECIFIED LATERALITY: Primary | ICD-10-CM

## 2020-03-23 RX ORDER — NAPROXEN 500 MG/1
500 TABLET ORAL 2 TIMES DAILY
Qty: 60 TABLET | Refills: 0 | Status: SHIPPED | OUTPATIENT
Start: 2020-03-23 | End: 2020-03-31 | Stop reason: ALTCHOICE

## 2020-03-23 NOTE — TELEPHONE ENCOUNTER
Very sorry to hear he is in pain.  Is this for his shoulder?     Naproxen 500 mg twice a day, with food, is OK.  I will send in rx to pharmacy

## 2020-03-23 NOTE — TELEPHONE ENCOUNTER
----- Message from Deborah Moctezuma sent at 3/23/2020  3:22 PM CDT -----  Contact: Annabelle Stone   Name of Who is Calling: Annabelle Stone     What is the request in detail: Annabelle Stone  Is requesting a call back regards to orders.... Please contact to further discuss and advise      Can the clinic reply by [facility]: No     What Number to [facility]:  [phone]

## 2020-03-23 NOTE — TELEPHONE ENCOUNTER
"----- Message from Tita Lyon sent at 3/23/2020  1:33 PM CDT -----  Contact: Annabelle Wells" [phone]   Type: Patient Call Back    Who called: Annabellecosta Hardinbria"    What is the request in detail: pt. Linda stated that pt. Is in extreme pain & would like recommendations on what pt. Can take for pain. PtLatanya Mckeon also stated that she has concerns with bring pt. In due to COVID-19. Pt. Stated this is her second message and would like a call back     Can the clinic reply by [facility]? Call back     Would the patient rather a call back or a response via [facility]? Call back     Best call back number: [phone]  "

## 2020-03-23 NOTE — TELEPHONE ENCOUNTER
Spoke with Annabelle and informed her that order was placed on 3/17/20. Gave her number to scheduling to see when this will be scheduled through interventional Radiology.

## 2020-03-23 NOTE — TELEPHONE ENCOUNTER
"----- Message from Venecia Ann sent at 3/23/2020  2:44 PM CDT -----  Contact:  Annabelle york "girlfriend"  .Type: Patient Call Back    Who called Annabelle york "girlfriend"    What is the request in detail: Requesting to know if the mass in his chest is benign or malignant. Pt  Is still having chest pain     Can the clinic reply by [facility]? Call back     Would the patient rather a call back or a response via [facility]? Call back     Best call back number:  [phone]          "

## 2020-03-24 RX ORDER — HYDROCODONE BITARTRATE AND ACETAMINOPHEN 5; 325 MG/1; MG/1
1 TABLET ORAL
Qty: 7 TABLET | Refills: 0 | Status: SHIPPED | OUTPATIENT
Start: 2020-03-24 | End: 2020-03-27 | Stop reason: SDUPTHER

## 2020-03-24 RX ORDER — TRAMADOL HYDROCHLORIDE 50 MG/1
50 TABLET ORAL
Qty: 7 TABLET | Refills: 0 | Status: SHIPPED | OUTPATIENT
Start: 2020-03-24 | End: 2020-03-24 | Stop reason: SINTOL

## 2020-03-24 NOTE — TELEPHONE ENCOUNTER
I will send in rx for tramadol.  Use sparingly. rx sent for once daily.  Can cause drowsiness so be cautious about taking it - don't take it and then drive or operate heavy machinery

## 2020-03-24 NOTE — TELEPHONE ENCOUNTER
Patient reports that he is having pain around his chest area to his right shoulder and under armpit for about 4 days now. 9/10 for pain. The pain wakes him up at night. Requesting something stronger than naproxen to help with the pain. Please advise.

## 2020-03-27 DIAGNOSIS — G89.29 CHRONIC SHOULDER PAIN, UNSPECIFIED LATERALITY: ICD-10-CM

## 2020-03-27 DIAGNOSIS — R91.8 MASS OF UPPER LOBE OF RIGHT LUNG: ICD-10-CM

## 2020-03-27 DIAGNOSIS — M25.519 CHRONIC SHOULDER PAIN, UNSPECIFIED LATERALITY: ICD-10-CM

## 2020-03-27 RX ORDER — HYDROCODONE BITARTRATE AND ACETAMINOPHEN 5; 325 MG/1; MG/1
1 TABLET ORAL EVERY 12 HOURS PRN
Qty: 14 TABLET | Refills: 0 | Status: SHIPPED | OUTPATIENT
Start: 2020-03-27 | End: 2020-03-31 | Stop reason: DRUGHIGH

## 2020-03-27 NOTE — TELEPHONE ENCOUNTER
----- Message from Shyann eduardoaashish sent at 3/27/2020  3:30 PM CDT -----  Type: RX Refill Request    Who Called:  Annabelle/ Linda    Have you contacted your pharmacy: No     Refill or New Rx: refill     RX Name and Strength: HYDROcodone-acetaminophen (NORCO) 5-325 mg per tablet    How is the patient currently taking it? (ex. 1XDay):    Is this a 30 day or 90 day RX:    Preferred Pharmacy with phone number: ..  [id number] - [place], [place] [id number] [place] [address] [address] [place] [address]  Phone: [phone] Fax: [phone]    Local or Mail Order: local    Ordering Provider:    Would the patient rather a call back or a response via [facility]? Call back     Best Call Back Number:    Additional Information:  Pt states pill that were prescribed are not enough.

## 2020-03-31 ENCOUNTER — OFFICE VISIT (OUTPATIENT)
Dept: HEMATOLOGY/ONCOLOGY | Facility: CLINIC | Age: 55
End: 2020-03-31
Payer: COMMERCIAL

## 2020-03-31 DIAGNOSIS — G89.29 CHRONIC SHOULDER PAIN, UNSPECIFIED LATERALITY: ICD-10-CM

## 2020-03-31 DIAGNOSIS — I10 ESSENTIAL HYPERTENSION: ICD-10-CM

## 2020-03-31 DIAGNOSIS — G89.3 CANCER RELATED PAIN: ICD-10-CM

## 2020-03-31 DIAGNOSIS — R91.8 MASS OF UPPER LOBE OF RIGHT LUNG: Primary | ICD-10-CM

## 2020-03-31 DIAGNOSIS — R91.8 MASS OF UPPER LOBE OF RIGHT LUNG: ICD-10-CM

## 2020-03-31 DIAGNOSIS — E78.5 DYSLIPIDEMIA: ICD-10-CM

## 2020-03-31 DIAGNOSIS — R05.9 COUGH: ICD-10-CM

## 2020-03-31 DIAGNOSIS — E11.9 TYPE 2 DIABETES MELLITUS WITHOUT COMPLICATION, WITHOUT LONG-TERM CURRENT USE OF INSULIN: ICD-10-CM

## 2020-03-31 DIAGNOSIS — M25.519 CHRONIC SHOULDER PAIN, UNSPECIFIED LATERALITY: ICD-10-CM

## 2020-03-31 DIAGNOSIS — I25.10 CORONARY ARTERY DISEASE, ANGINA PRESENCE UNSPECIFIED, UNSPECIFIED VESSEL OR LESION TYPE, UNSPECIFIED WHETHER NATIVE OR TRANSPLANTED HEART: ICD-10-CM

## 2020-03-31 PROCEDURE — 99204 PR OFFICE/OUTPT VISIT, NEW, LEVL IV, 45-59 MIN: ICD-10-PCS | Mod: 95,,, | Performed by: INTERNAL MEDICINE

## 2020-03-31 PROCEDURE — 99204 OFFICE O/P NEW MOD 45 MIN: CPT | Mod: 95,,, | Performed by: INTERNAL MEDICINE

## 2020-03-31 PROCEDURE — 2024F 7 FLD RTA PHOTO EVC RTNOPTHY: CPT | Mod: S$GLB,,, | Performed by: INTERNAL MEDICINE

## 2020-03-31 PROCEDURE — 2024F PR 7 FIELD PHOTOS WITH INTERP/ REVIEW: ICD-10-PCS | Mod: S$GLB,,, | Performed by: INTERNAL MEDICINE

## 2020-03-31 RX ORDER — OXYCODONE HYDROCHLORIDE 5 MG/1
5 TABLET ORAL EVERY 4 HOURS PRN
Qty: 90 TABLET | Refills: 0 | Status: SHIPPED | OUTPATIENT
Start: 2020-03-31 | End: 2020-04-20

## 2020-03-31 RX ORDER — MORPHINE SULFATE 15 MG/1
15 TABLET, FILM COATED, EXTENDED RELEASE ORAL 2 TIMES DAILY
Qty: 60 TABLET | Refills: 0 | Status: SHIPPED | OUTPATIENT
Start: 2020-03-31 | End: 2020-04-13 | Stop reason: DRUGHIGH

## 2020-03-31 RX ORDER — HYDROCODONE BITARTRATE AND ACETAMINOPHEN 10; 325 MG/1; MG/1
1 TABLET ORAL EVERY 6 HOURS PRN
Qty: 28 TABLET | Refills: 0 | Status: SHIPPED | OUTPATIENT
Start: 2020-03-31 | End: 2020-03-31 | Stop reason: ALTCHOICE

## 2020-03-31 RX ORDER — HYDROCODONE BITARTRATE AND ACETAMINOPHEN 5; 325 MG/1; MG/1
2 TABLET ORAL EVERY 12 HOURS PRN
Qty: 28 TABLET | Refills: 0 | Status: CANCELLED | OUTPATIENT
Start: 2020-03-31

## 2020-03-31 RX ORDER — BENZONATATE 100 MG/1
100 CAPSULE ORAL 3 TIMES DAILY PRN
Qty: 30 CAPSULE | Refills: 6 | Status: SHIPPED | OUTPATIENT
Start: 2020-03-31 | End: 2020-04-30

## 2020-03-31 NOTE — TELEPHONE ENCOUNTER
----- Message from Nellie Aubrey sent at 3/31/2020  9:54 AM CDT -----  Contact: pt  Type: Patient Call Back    Who called:pt    What is the request in detail:asking for more pain medication. Please call pt    Can the clinic reply by [facility]?    Would the patient rather a call back or a response via [facility]? call    Best call back number:[phone]    Additional Information:

## 2020-03-31 NOTE — PRE ADMISSION SCREENING
Telephone screen instructions    Meds and allergies reviewed.   Instructed patient as follows:     Report to patient registration at 815 am on first floor   Nothing to eat or drink after midnight on the night before surgery.   You may take  METOPROLOL AND PAIN MEDS   before coming to hospital on day of surgery with a small sip of water.   Do not bring anything valuable with you other than what you will need to make your copayment.  Remove all  jewelry.    Make sure you have someone available to drive you home.     Do not take any diabetic medications the morning of surgery.     I gave my name and phone number to patient in case they have any questions

## 2020-03-31 NOTE — Clinical Note
The patient will need to be scheduled for an urgent PET/CT and MRI of the brain.  This will help dictate the patient's treatment.  The patient will need follow up with me once these scans are completed.

## 2020-03-31 NOTE — PROGRESS NOTES
The patient location is: home  The chief complaint leading to consultation is: right lung mass  Visit type: Virtual visit with synchronous audio and video  Total time spent with patient: 20 minutes  Each patient to whom he or she provides medical services by telemedicine is:  (1) informed of the relationship between the physician and patient and the respective role of any other health care provider with respect to management of the patient; and (2) notified that he or she may decline to receive medical services by telemedicine and may withdraw from such care at any time.    Notes: see below      PATIENT: Luis Felipe Hawk  MRN: [id number]  DATE: 3/31/2020      Diagnosis:   1. Mass of upper lobe of right lung    2. Cancer related pain    3. Cough    4. Essential hypertension    5. Dyslipidemia    6. Type 2 diabetes mellitus without complication, without long-term current use of insulin    7. Coronary artery disease, angina presence unspecified, unspecified vessel or lesion type, unspecified whether native or transplanted heart        Chief Complaint: No chief complaint on file.      Subjective:    Initial History: Mr. Hawk is a 54 y.o. male with HTN, HLD, DMII, CAD who presents to establish care for RUL lung mass.  The patient was initially seen by Dr Carmona on 11/21/19 for a cough and underwent a CXR showing a soft tissue mass in the anterior segment of the right upper lobe that measures at least 4 cm in cranial-caudad extent.  Per chart review an attempt to contact MR Hawk was mad on multiple occasions in order to have a CT of the chest done.  CT of the chest was eventually done on 3/13/20 and showed enlarged right paratracheal LN measuring 2.8 cm, slightly enlarged right hilar nodes, a mass within the anterior segment of the right upper lobe measuring 3.9 x 4.7 cm abutting the right suprahilar region and obliterating the anterior segmental bronchus.  Currently the patient states he has continuous pain in the right  chest, right shoulder and right back rated 10/10.  Pain is worse at night.  He has been taking percocet 5-325mg 2 tablets ever 3-4 hours for relief.  He also has an assocaited cough for which he is taking mucinex but states it is not helping.  The patient also endorses decreased appetite and weight loss. He endorses SOB with exertion.  He has occasional diarrhea but denies abdominal pain, N/N.    Past Medical History:   Past Medical History:   Diagnosis Date    CAD (coronary artery disease) 2013    one stent placed    DMII (diabetes mellitus, type 2)     Hypertension        Past Surgical HIstory: History reviewed. No pertinent surgical history.    Family History:   Family History   Problem Relation Age of Onset    Peripheral vascular disease Mother     Heart disease Father     No Known Problems Brother     No Known Problems Brother     No Known Problems Son     No Known Problems Son     No Known Problems Daughter     No Known Problems Daughter     No Known Problems Daughter        Social History:  reports that he has been smoking cigarettes. He has a 43.00 pack-year smoking history. He has never used smokeless tobacco. He reports that he drinks about 3.0 standard drinks of alcohol per week. He reports that he does not use drugs.    Allergies:  Review of patient's allergies indicates:   Allergen Reactions    Tramadol      Chest burning       Medications:  Current Outpatient Medications   Medication Sig Dispense Refill    albuterol (PROVENTIL/VENTOLIN HFA) 90 mcg/actuation inhaler Inhale 2 puffs into the lungs every 6 (six) hours as needed for Wheezing. Whichever brand covered by insurance 18 g 0    aspirin (ECOTRIN) 81 MG EC tablet Take 1 tablet (81 mg total) by mouth once daily. 30 tablet 11    atorvastatin (LIPITOR) 80 MG tablet Take 80 mg by mouth.      benzonatate (TESSALON) 100 MG capsule Take 1 capsule (100 mg total) by mouth 3 (three) times daily as needed for Cough. 30 capsule 6     blood-glucose meter kit To check BG 1 times daily, to use with insurance preferred meter 1 each 0    hydroCHLOROthiazide (HYDRODIURIL) 25 MG tablet TK 1 T PO ONE TIME PER DAY FOR BP 90 tablet 3    metFORMIN (GLUCOPHAGE) 500 MG tablet 1 po qAM x 1 wk; then 1 po BID x 1 wk; then 2 AM/1 PM x 1 wk; then 2 BID maintenance. Take with food 120 tablet 5    metoprolol succinate (TOPROL-XL) 25 MG 24 hr tablet Take 1 tablet (25 mg total) by mouth once daily. 90 tablet 3    morphine (MS CONTIN) 15 MG 12 hr tablet Take 1 tablet (15 mg total) by mouth 2 (two) times daily. 60 tablet 0    oxyCODONE (ROXICODONE) 5 MG immediate release tablet Take 1 tablet (5 mg total) by mouth every 4 (four) hours as needed for Pain. 90 tablet 0     No current facility-administered medications for this visit.        Review of Systems   Constitutional: Positive for appetite change, fever and unexpected weight change. Negative for chills and diaphoresis.   HENT: Negative for sore throat and trouble swallowing.    Eyes: Negative for photophobia and visual disturbance.   Respiratory: Positive for cough and shortness of breath (with exertion). Negative for chest tightness.    Cardiovascular: Negative for chest pain, palpitations and leg swelling.   Gastrointestinal: Positive for diarrhea (on ocasion). Negative for abdominal pain, constipation, nausea and vomiting.   Endocrine: Negative for cold intolerance and heat intolerance.   Genitourinary: Negative for difficulty urinating, dysuria and hematuria.   Musculoskeletal: Positive for arthralgias (right shoulder and back). Negative for back pain and myalgias.   Skin: Negative for color change and rash.   Neurological: Negative for dizziness, light-headedness, numbness and headaches.   Hematological: Negative for adenopathy. Does not bruise/bleed easily.       ECOG Performance Status: 2   Objective:      Vitals: There were no vitals filed for this visit.    Physical Exam   Constitutional: He is  oriented to person, place, and time. He appears well-developed and well-nourished. No distress.   Neurological: He is alert and oriented to person, place, and time.   Skin: He is not diaphoretic.   Psychiatric: He has a normal mood and affect.       Laboratory Data:  No visits with results within 1 Week(s) from this visit.   Latest known visit with results is:   Lab Visit on 03/13/2020   Component Date Value Ref Range Status    WBC 03/13/2020 7.72  3.90 - 12.70 K/uL Final    RBC 03/13/2020 4.97  4.60 - 6.20 M/uL Final    Hemoglobin 03/13/2020 14.3  14.0 - 18.0 g/dL Final    Hematocrit 03/13/2020 43.3  40.0 - 54.0 % Final    Mean Corpuscular Volume 03/13/2020 87  82 - 98 fL Final    Mean Corpuscular Hemoglobin 03/13/2020 28.8  27.0 - 31.0 pg Final    Mean Corpuscular Hemoglobin Conc 03/13/2020 33.0  32.0 - 36.0 g/dL Final    RDW 03/13/2020 13.1  11.5 - 14.5 % Final    Platelets 03/13/2020 220  150 - 350 K/uL Final    MPV 03/13/2020 10.1  9.2 - 12.9 fL Final    Immature Granulocytes 03/13/2020 0.3  0.0 - 0.5 % Final    Gran # (ANC) 03/13/2020 4.0  1.8 - 7.7 K/uL Final    Immature Grans (Abs) 03/13/2020 0.02  0.00 - 0.04 K/uL Final    Comment: Mild elevation in immature granulocytes is non specific and   can be seen in a variety of conditions including stress response,   acute inflammation, trauma and pregnancy. Correlation with other   laboratory and clinical findings is essential.      Lymph # 03/13/2020 2.9  1.0 - 4.8 K/uL Final    Mono # 03/13/2020 0.7  0.3 - 1.0 K/uL Final    Eos # 03/13/2020 0.1  0.0 - 0.5 K/uL Final    Baso # 03/13/2020 0.02  0.00 - 0.20 K/uL Final    nRBC 03/13/2020 0  0 /100 WBC Final    Gran% 03/13/2020 52.2  38.0 - 73.0 % Final    Lymph% 03/13/2020 36.9  18.0 - 48.0 % Final    Mono% 03/13/2020 8.7  4.0 - 15.0 % Final    Eosinophil% 03/13/2020 1.6  0.0 - 8.0 % Final    Basophil% 03/13/2020 0.3  0.0 - 1.9 % Final    Differential Method 03/13/2020 Automated   Final     Sodium 03/13/2020 136  136 - 145 mmol/L Final    Potassium 03/13/2020 4.0  3.5 - 5.1 mmol/L Final    Chloride 03/13/2020 104  95 - 110 mmol/L Final    CO2 03/13/2020 22* 23 - 29 mmol/L Final    Glucose 03/13/2020 281* 70 - 110 mg/dL Final    BUN, Bld 03/13/2020 11  6 - 20 mg/dL Final    Creatinine 03/13/2020 1.0  0.5 - 1.4 mg/dL Final    Calcium 03/13/2020 8.9  8.7 - 10.5 mg/dL Final    Total Protein 03/13/2020 7.3  6.0 - 8.4 g/dL Final    Albumin 03/13/2020 3.5  3.5 - 5.2 g/dL Final    Total Bilirubin 03/13/2020 0.2  0.1 - 1.0 mg/dL Final    Comment: For infants and newborns, interpretation of results should be based  on gestational age, weight and in agreement with clinical  observations.  Premature Infant recommended reference ranges:  Up to 24 hours.............<8.0 mg/dL  Up to 48 hours............<12.0 mg/dL  3-5 days..................<15.0 mg/dL  6-29 days.................<15.0 mg/dL      Alkaline Phosphatase 03/13/2020 128  55 - 135 U/L Final    AST 03/13/2020 25  10 - 40 U/L Final    ALT 03/13/2020 39  10 - 44 U/L Final    Anion Gap 03/13/2020 10  8 - 16 mmol/L Final    eGFR if African American 03/13/2020 >60  >60 mL/min/1.73 m^2 Final    eGFR if non African American 03/13/2020 >60  >60 mL/min/1.73 m^2 Final    Comment: Calculation used to obtain the estimated glomerular filtration  rate (eGFR) is the CKD-EPI equation.       Hemoglobin A1C 03/13/2020 10.1* 4.0 - 5.6 % Final    Comment: ADA Screening Guidelines:  5.7-6.4%  Consistent with prediabetes  >or=6.5%  Consistent with diabetes  High levels of fetal hemoglobin interfere with the HbA1C  assay. Heterozygous hemoglobin variants (HbS, HgC, etc)do  not significantly interfere with this assay.   However, presence of multiple variants may affect accuracy.      Estimated Avg Glucose 03/13/2020 243* 68 - 131 mg/dL Final    HIV 1/2 Ag/Ab 03/13/2020 Negative  Negative Final    Microalbum.,U,Random 03/13/2020 5.0  ug/mL Final    Creatinine,  Random Ur 03/13/2020 131.0  23.0 - 375.0 mg/dL Final    Comment: The random urine reference ranges provided were established   for 24 hour urine collections.  No reference ranges exist for  random urine specimens.  Correlate clinically.      Microalb Creat Ratio 03/13/2020 3.8  0.0 - 30.0 ug/mg Final         Imaging: CT Chest 3/13/20    Right paratracheal and large node measuring 2.8 cm, series 2, image 31.  No subcarinal or left hilar nodes.  There are slightly enlarged right hilar nodes.  The thoracic aorta is of normal caliber, there is no abnormal atherosclerotic plaque.  No pericardial effusion.  Mosaic attenuation bilaterally suggestive of underlying inflammatory small airway disease.  There is a mass within the anterior segment of the right upper lobe measuring 3.9 x 4.7 cm series 4, image 220.  The pulmonary vessels appear to be patent.    The mass abuts the right suprahilar region.  It appears to obliterate the anterior segmental bronchus.  It abuts the right paramediastinal region.  No pleural effusion.  No pneumothorax.  The axillary regions appear normal.    The upper abdominal organs demonstrate no abnormalities, the adrenal glands appear normal.  The osseous structures demonstrate no osseous lesions.     Assessment:       1. Mass of upper lobe of right lung    2. Cancer related pain    3. Cough    4. Essential hypertension    5. Dyslipidemia    6. Type 2 diabetes mellitus without complication, without long-term current use of insulin    7. Coronary artery disease, angina presence unspecified, unspecified vessel or lesion type, unspecified whether native or transplanted heart           Plan:     Mass in the Right Lung - the patient has a mass in the right lung with assocaited enlarged paratracheal LN's and obliteration of the anterior segmental bronchus  -Concern is for primary lung cancer given mass location and 43 pack year smoking history  -Pt to have IR biopsy of the lung tomorrow  -Will schedule  the patient for PET/CT and MRI of the brain given concern for advanced lung cancer    Cancer Related pain - the patient is taking two 5-325 percocet's every 3-4 hours for pain in his right chest, right shoulder and right back.  -Will discontinue the patient's percocet and have the patient take MS Contin 15mg BID with 10mg or percocet as needed every 4 hours for breakthrough pain    Cough - pt prescribed tessalon   -Will monitor    HTN - pt on HCTZ and metoprolol  -Will monitor    HLD - pt on lipitor  -PCP managing    DMII - pt's A1C is 10.1 on 3/13/20  -Pt started on metformin  -Will monitor    CAD -  Pt on statin, ASA, metoprolol  -ASA being held for IR biopsy tomorrow.    -Follow Up - once PET/CT and MRI brain completed and path from IR biopsy resulted.    Richar Avendaño MD  Hematology and Oncology  [facility]  Office:[phone]  Fax: [phone]

## 2020-04-01 ENCOUNTER — HOSPITAL ENCOUNTER (OUTPATIENT)
Facility: HOSPITAL | Age: 55
Discharge: HOME | End: 2020-04-01
Attending: RADIOLOGY | Admitting: RADIOLOGY
Payer: MEDICAID

## 2020-04-01 ENCOUNTER — NURSE TRIAGE (OUTPATIENT)
Dept: ADMINISTRATIVE | Facility: CLINIC | Age: 55
End: 2020-04-01

## 2020-04-01 VITALS
RESPIRATION RATE: 18 BRPM | DIASTOLIC BLOOD PRESSURE: 74 MMHG | BODY MASS INDEX: 31.83 KG/M2 | OXYGEN SATURATION: 96 % | TEMPERATURE: 98 F | HEIGHT: 68 IN | HEART RATE: 84 BPM | SYSTOLIC BLOOD PRESSURE: 138 MMHG | WEIGHT: 210 LBS

## 2020-04-01 DIAGNOSIS — R91.8 MASS OF UPPER LOBE OF RIGHT LUNG: Primary | ICD-10-CM

## 2020-04-01 DIAGNOSIS — R91.8 MASS OF RIGHT LUNG: ICD-10-CM

## 2020-04-01 LAB
INR PPP: 1 (ref 0.8–1.2)
PROTHROMBIN TIME: 10.6 SEC (ref 9–12.5)

## 2020-04-01 PROCEDURE — 88342 IMHCHEM/IMCYTCHM 1ST ANTB: CPT | Mod: 26,,, | Performed by: PATHOLOGY

## 2020-04-01 PROCEDURE — 36415 COLL VENOUS BLD VENIPUNCTURE: CPT

## 2020-04-01 PROCEDURE — 88342 CHG IMMUNOCYTOCHEMISTRY: ICD-10-PCS | Mod: 26,,, | Performed by: PATHOLOGY

## 2020-04-01 PROCEDURE — 88342 IMHCHEM/IMCYTCHM 1ST ANTB: CPT | Performed by: PATHOLOGY

## 2020-04-01 PROCEDURE — 82962 GLUCOSE BLOOD TEST: CPT

## 2020-04-01 PROCEDURE — 88305 TISSUE EXAM BY PATHOLOGIST: CPT | Performed by: PATHOLOGY

## 2020-04-01 PROCEDURE — 63600175 PHARM REV CODE 636 W HCPCS: Performed by: RADIOLOGY

## 2020-04-01 PROCEDURE — 88305 TISSUE EXAM BY PATHOLOGIST: ICD-10-PCS | Mod: 26,,, | Performed by: PATHOLOGY

## 2020-04-01 PROCEDURE — 88341 IMHCHEM/IMCYTCHM EA ADD ANTB: CPT | Performed by: PATHOLOGY

## 2020-04-01 PROCEDURE — 88305 TISSUE EXAM BY PATHOLOGIST: CPT | Mod: 26,,, | Performed by: PATHOLOGY

## 2020-04-01 PROCEDURE — 85610 PROTHROMBIN TIME: CPT

## 2020-04-01 PROCEDURE — 88341 PR IHC OR ICC EACH ADD'L SINGLE ANTIBODY  STAINPR: ICD-10-PCS | Mod: 26,,, | Performed by: PATHOLOGY

## 2020-04-01 PROCEDURE — 88341 IMHCHEM/IMCYTCHM EA ADD ANTB: CPT | Mod: 26,,, | Performed by: PATHOLOGY

## 2020-04-01 RX ORDER — MIDAZOLAM HYDROCHLORIDE 1 MG/ML
INJECTION INTRAMUSCULAR; INTRAVENOUS CODE/TRAUMA/SEDATION MEDICATION
Status: COMPLETED | OUTPATIENT
Start: 2020-04-01 | End: 2020-04-01

## 2020-04-01 RX ORDER — FENTANYL CITRATE 50 UG/ML
INJECTION, SOLUTION INTRAMUSCULAR; INTRAVENOUS CODE/TRAUMA/SEDATION MEDICATION
Status: COMPLETED | OUTPATIENT
Start: 2020-04-01 | End: 2020-04-01

## 2020-04-01 RX ADMIN — MIDAZOLAM HYDROCHLORIDE 1 MG: 1 INJECTION, SOLUTION INTRAMUSCULAR; INTRAVENOUS at 09:04

## 2020-04-01 RX ADMIN — FENTANYL CITRATE 50 MCG: 50 INJECTION INTRAMUSCULAR; INTRAVENOUS at 09:04

## 2020-04-01 NOTE — TELEPHONE ENCOUNTER
Had a video visit and has a mass in his chest. Has a biopsy scheduled today. Was not able to get morphine. Did get oxycodone 5mg. Wanting to know if she can give the patient an extra pill since they were not able to get the morphine. Called transferred to Dr. Avendaño through the     Reason for Disposition   Caller has urgent medication question about med that PCP prescribed and triager unable to answer question    Protocols used: MEDICATION QUESTION CALL-A-AH

## 2020-04-01 NOTE — PLAN OF CARE
DISCHARGE INSTRUCTIONS REVIEWED WITH CLIENT; ALL QUESTIONS ANSWERED AND STATED THAT HE UNDERSTOOD.

## 2020-04-01 NOTE — BRIEF OP NOTE
Radiology Post-Procedure Note    Pre Op Diagnosis: RUL pulmonary mass  Post Op Diagnosis: Same    Procedure: CT-guided percutaneous 20-gauge core biopsy of RUL mass    Procedure performed by: Gera Pryor MD    Written Informed Consent Obtained: Yes  Specimen Removed: YES, 20-gauge cores x 6  Estimated Blood Loss: none    Findings:   Successful CT-guided percutaneous 20-gauge core biopsy of RUL mass under moderate conscious sedation. Patient tolerated the procedure well. No immediate post-procedural complications noted.     Thank you for considering IR for the care of your patient.     Gera Pryor MD  Interventional Radiology

## 2020-04-01 NOTE — H&P
Radiology History & Physical      SUBJECTIVE:     Chief Complaint: Right lung mass    History of Present Illness:  Luis Felipe Hawk is a 54 y.o. male with pertinent PMHx of recently noted RUL lung mass and mediastinal LAD concerning for malignancy.     New inpatient IR consult placed for CT-guided biopsy of the RUL lung mass.      Past Medical History:   Diagnosis Date    CAD (coronary artery disease) 2013    one stent placed    DMII (diabetes mellitus, type 2)     Hypertension     Mass of right lung      Past Surgical History:   Procedure Laterality Date    CV STENT      X 1     Home Meds:   Prior to Admission medications    Medication Sig Start Date End Date Taking? Authorizing Provider   albuterol (PROVENTIL/VENTOLIN HFA) 90 mcg/actuation inhaler Inhale 2 puffs into the lungs every 6 (six) hours as needed for Wheezing. Whichever brand covered by insurance 3/6/20 3/6/21 Yes El Carmona MD   aspirin (ECOTRIN) 81 MG EC tablet Take 1 tablet (81 mg total) by mouth once daily. 11/21/19 11/20/20 Yes El Carmona MD   atorvastatin (LIPITOR) 80 MG tablet Take 80 mg by mouth.   Yes Historical Provider, MD   benzonatate (TESSALON) 100 MG capsule Take 1 capsule (100 mg total) by mouth 3 (three) times daily as needed for Cough. 3/31/20 4/30/20 Yes Richar Avendaño MD   blood-glucose meter kit To check BG 1 times daily, to use with insurance preferred meter 3/6/20  Yes El Carmona MD   hydroCHLOROthiazide (HYDRODIURIL) 25 MG tablet TK 1 T PO ONE TIME PER DAY FOR BP 11/21/19  Yes El Carmona MD   metFORMIN (GLUCOPHAGE) 500 MG tablet 1 po qAM x 1 wk; then 1 po BID x 1 wk; then 2 AM/1 PM x 1 wk; then 2 BID maintenance. Take with food 3/16/20  Yes El Carmona MD   metoprolol succinate (TOPROL-XL) 25 MG 24 hr tablet Take 1 tablet (25 mg total) by mouth once daily. 11/21/19 11/20/20 Yes El Carmona MD   morphine (MS CONTIN) 15 MG 12 hr tablet Take 1 tablet (15 mg total) by mouth 2 (two) times daily. 3/31/20   Yes Richar Avendaño MD   oxyCODONE (ROXICODONE) 5 MG immediate release tablet Take 1 tablet (5 mg total) by mouth every 4 (four) hours as needed for Pain. 3/31/20  Yes Richar Avendaño MD     Anticoagulants/Antiplatelets: no anticoagulation    Allergies:   Review of patient's allergies indicates:   Allergen Reactions    Tramadol      Chest burning     Sedation History:  no adverse reactions    Review of Systems:   Hematological: no known coagulopathies  Respiratory: positive for - cough and shortness of breath  Cardiovascular: positive for - chest pain and shortness of breath  Gastrointestinal: no abdominal pain, change in bowel habits, or black or bloody stools  Genito-Urinary: no dysuria, trouble voiding, or hematuria  Musculoskeletal: negative  Neurological: no TIA or stroke symptoms       OBJECTIVE:     Vital Signs (Most Recent)     Physical Exam:  ASA: III  Mallampati: I    General: no acute distress  Mental Status: alert and oriented to person, place and time  HEENT: normocephalic, atraumatic  Chest: unlabored breathing  Heart: regular heart rate  Abdomen: nondistended  Extremity: moves all extremities    Laboratory  Lab Results   Component Value Date    INR 1.0 12/09/2013       Lab Results   Component Value Date    WBC 7.72 03/13/2020    HGB 14.3 03/13/2020    HCT 43.3 03/13/2020    MCV 87 03/13/2020     03/13/2020      Lab Results   Component Value Date     (H) 03/13/2020     03/13/2020    K 4.0 03/13/2020     03/13/2020    CO2 22 (L) 03/13/2020    BUN 11 03/13/2020    CREATININE 1.0 03/13/2020    CALCIUM 8.9 03/13/2020    ALT 39 03/13/2020    AST 25 03/13/2020    ALBUMIN 3.5 03/13/2020    BILITOT 0.2 03/13/2020     ASSESSMENT/PLAN:     54 y.o. male with pertinent PMHx of recently noted RUL lung mass and mediastinal LAD concerning for malignancy.     1. RUL lung mass - Will attempt CT-guided percutaneous 20-gauge core biopsy of the RUL lung mass under moderate conscious  sedation.    Risks (including, but not limited to, pain, bleeding, infection, damage to nearby structures, failure to obtain sufficient material for a diagnosis, the need for additional procedures, and death), benefits, and alternatives were discussed with the patient. All questions were answered to the best of my abilities. The patient wishes to proceed with the procedure. Written informed consent was obtained.    Thank you for considering IR for the care of your patient.     Gera Pryor MD  Interventional Radiology

## 2020-04-01 NOTE — DISCHARGE SUMMARY
Radiology Discharge Summary      Hospital Course: No complications    Admit Date: 4/1/2020  Discharge Date: 04/01/2020     Instructions Given to Patient: Yes    Diet: Resume prior diet     Activity: activity as tolerated and no driving for today    Description of Condition on Discharge: Stable    Vital Signs (Most Recent): Temp: 98.4 °F (36.9 °C) (04/01/20 0840)  Pulse: 80 (04/01/20 1030)  Resp: 18 (04/01/20 1030)  BP: (!) 145/86 (04/01/20 1030)  SpO2: 95 % (04/01/20 1030)    Discharge Disposition: Home    Discharge Diagnosis:  54 y.o. male with RUL lung mass s/p successful CT-guided percutaneous 20-gauge core biopsy of the RUL lung mass under moderate conscious sedation. Patient tolerated the procedure well. No immediate post-procedural complications noted.     Thank you for considering IR for the care of your patient.     Gera Pryor MD  Interventional Radiology

## 2020-04-01 NOTE — SEDATION DOCUMENTATION
Report called to Sandra in PACU. Lung biopsy completed to R lung. Site with vaseline gauze and tegaderm, CDI, no redness, swelling or hematoma noted. Specimen sent to lab. Adequate tissue per pathologist. Tolerated procedure well. FATIMAH LAYNE. Transported to PACU per RN.

## 2020-04-02 ENCOUNTER — TELEPHONE (OUTPATIENT)
Dept: HEMATOLOGY/ONCOLOGY | Facility: CLINIC | Age: 55
End: 2020-04-02

## 2020-04-02 NOTE — TELEPHONE ENCOUNTER
I called the patient whom stated he has been congested at night and that is has been hard for him to sleep.  I recommended he take claritin or allegra during the day and benadryl at night to help with his congestion and aid in sleep.  The patient expressed understanding.  All questions were answered to his satisfaction.    Richar Avendaño MD  Hematology and Oncology  [facility]  Office:[phone]  Fax: [phone]

## 2020-04-06 ENCOUNTER — DOCUMENTATION ONLY (OUTPATIENT)
Dept: HEMATOLOGY/ONCOLOGY | Facility: CLINIC | Age: 55
End: 2020-04-06

## 2020-04-06 ENCOUNTER — TELEPHONE (OUTPATIENT)
Dept: HEMATOLOGY/ONCOLOGY | Facility: CLINIC | Age: 55
End: 2020-04-06

## 2020-04-06 NOTE — PROGRESS NOTES
"Received message forwarded by my coworker Gisel Marrero LCSW, from Dr. Avendaño re: patient needing co-pay assistance for a PET/CT scan.  Odalis Ventura notified Dr. Avendaño of the patient's financial liability ("PLAN PAYS 250.00 FOR PET. ANYTHING REMAINING IS THE PATIENT'S RESPONSIBILITY original visit estimate $1205.61/ pt portion $955.61"),  and the attached referral information noted that patient has a limited medical insurance plan.  Patient has newly diagnosed NSCLC, Adenocarcinoma. Forwarded message to Cancer Services  Fredi Sanderson and her Supervisor Fredi Adams, and to Louise Abdul with [facility] Dept[facility] requesting that staff contact patient to be screened for Medicaid as a secondary insurance and/or [facility] Financial Assistance plan. While the current co-pay assistance request is related to the PET/CT scan that Dr. Avendaño has ordered for staging, patient will likely have large out-of-pocket co-pays for care and treatment too going forward.   Called patient at [phone] and discussed above with him, as well as his fiancee' Annabelle Ponce who was with him and joined in the conversation on his speaker phone. Patient is a , but is not currently working and is under Workman's Comp for a shoulder injury. Patient does not have disability benefits through his job, and has started to look into applying for Social Security Disability but has not actually applied yet.  Explained role and availability of Oncology Social Worker and provided them with my contact information. Also informed them of other support staff available - Onc Psychologist, RD, RN Navigator. Patient has reported feeling anxious and worried and his fiancee' doesn't know how to help him. Suggested they consider meeting with one of our Oncology Psychologists and speak with Dr. Avendaño about this and he can enter the referral/consult order. They expressed understanding.       Will continue to follow      "

## 2020-04-06 NOTE — TELEPHONE ENCOUNTER
I called the patient and let him know that he could try calling  option 3 in order to apply for financial assistance for coverage of his PET/CT.  I informed him that I had called prior to giving him a call and that the line was busy.  I recommended he try calling tomorrow. I also informed him that I had reached out to our  Gisel Marrero in order to obtain assistance.  We also discussed that the patient has NSCLC - adenocarcinoma base on the results of the lung biopsy on 4/01/20 and that the PET/CT would dictate how we would treat him.  The patient expressed understanding.  All questions were answered to his satisfaction.    Richar Avendaño MD  Hematology and Oncology  [facility]  Office:[phone]  Fax: [phone]

## 2020-04-07 RX ORDER — ATORVASTATIN CALCIUM 80 MG/1
80 TABLET, FILM COATED ORAL DAILY
Qty: 90 TABLET | Refills: 3 | Status: SHIPPED | OUTPATIENT
Start: 2020-04-07 | End: 2021-04-15 | Stop reason: SDUPTHER

## 2020-04-09 ENCOUNTER — DOCUMENTATION ONLY (OUTPATIENT)
Dept: HEMATOLOGY/ONCOLOGY | Facility: CLINIC | Age: 55
End: 2020-04-09

## 2020-04-09 ENCOUNTER — NURSE TRIAGE (OUTPATIENT)
Dept: ADMINISTRATIVE | Facility: CLINIC | Age: 55
End: 2020-04-09

## 2020-04-09 ENCOUNTER — HOSPITAL ENCOUNTER (OUTPATIENT)
Dept: RADIOLOGY | Facility: HOSPITAL | Age: 55
Discharge: HOME | End: 2020-04-09
Attending: INTERNAL MEDICINE
Payer: COMMERCIAL

## 2020-04-09 ENCOUNTER — HOSPITAL ENCOUNTER (OUTPATIENT)
Dept: RADIOLOGY | Facility: HOSPITAL | Age: 55
Discharge: HOME | End: 2020-04-09
Attending: INTERNAL MEDICINE
Payer: MEDICAID

## 2020-04-09 DIAGNOSIS — R91.8 MASS OF UPPER LOBE OF RIGHT LUNG: ICD-10-CM

## 2020-04-09 PROBLEM — C34.91 ADENOCARCINOMA, LUNG, RIGHT: Status: ACTIVE | Noted: 2020-03-16

## 2020-04-09 PROCEDURE — 70553 MRI BRAIN STEM W/O & W/DYE: CPT | Mod: TC

## 2020-04-09 PROCEDURE — 78815 PET IMAGE W/CT SKULL-THIGH: CPT | Mod: TC

## 2020-04-09 PROCEDURE — 70553 MRI BRAIN STEM W/O & W/DYE: CPT | Mod: 26,,, | Performed by: RADIOLOGY

## 2020-04-09 PROCEDURE — 25500020 PHARM REV CODE 255: Performed by: INTERNAL MEDICINE

## 2020-04-09 PROCEDURE — A9552 F18 FDG: HCPCS

## 2020-04-09 PROCEDURE — 78815 PET IMAGE W/CT SKULL-THIGH: CPT | Mod: 26,PI,GC, | Performed by: RADIOLOGY

## 2020-04-09 PROCEDURE — 78815 NM PET CT ROUTINE: ICD-10-PCS | Mod: 26,PI,GC, | Performed by: RADIOLOGY

## 2020-04-09 PROCEDURE — A9585 GADOBUTROL INJECTION: HCPCS | Performed by: INTERNAL MEDICINE

## 2020-04-09 PROCEDURE — 70553 MRI BRAIN W WO CONTRAST: ICD-10-PCS | Mod: 26,,, | Performed by: RADIOLOGY

## 2020-04-09 RX ORDER — GADOBUTROL 604.72 MG/ML
10 INJECTION INTRAVENOUS
Status: COMPLETED | OUTPATIENT
Start: 2020-04-09 | End: 2020-04-09

## 2020-04-09 RX ADMIN — GADOBUTROL 10 ML: 604.72 INJECTION INTRAVENOUS at 09:04

## 2020-04-10 NOTE — TELEPHONE ENCOUNTER
"  Reason for Disposition   Pain is mainly in upper abdomen  (if needed ask: "is it mainly above the belly button?")   Constipation in a cancer patient who is currently (or recently) receiving chemotherapy or radiation therapy, or cancer patient who has metastatic or end-stage cancer and is receiving palliative care   Last bowel movement (BM) > 4 days ago    Additional Information   Negative: Shock suspected (e.g., cold/pale/clammy skin, too weak to stand, low BP, rapid pulse)   Negative: Difficult to awaken or acting confused (e.g., disoriented, slurred speech)   Negative: Passed out (i.e., lost consciousness, collapsed and was not responding)   Negative: Sounds like a life-threatening emergency to the triager   Negative: Chest pain   Negative: Severe difficulty breathing (e.g., struggling for each breath, speaks in single words)   Negative: Shock suspected (e.g., cold/pale/clammy skin, too weak to stand, low BP, rapid pulse)   Negative: Difficult to awaken or acting confused (e.g., disoriented, slurred speech)   Negative: Passed out (i.e., lost consciousness, collapsed and was not responding)   Negative: Visible sweat on face or sweat dripping down face   Negative: Sounds like a life-threatening emergency to the triager   Negative: Followed an abdomen (stomach) injury   Negative: Chest pain   Negative: [1] SEVERE pain (e.g., excruciating) AND [2] present > 1 hour   Negative: [1] Pain lasts > 10 minutes AND [2] age > 50   Negative: [1] Pain lasts > 10 minutes AND [2] age > 40 AND [3] associated chest, arm, neck, upper back or jaw pain   Negative: [1] Pain lasts > 10 minutes AND [2] age > 35 AND [3] at least one cardiac risk factor (i.e., hypertension, diabetes, obesity, smoker or strong family history of heart disease)   Negative: [1] Pain lasts > 10 minutes AND [2] history of heart disease (i.e., heart attack, bypass surgery, angina, angioplasty, CHF; not just a heart murmur)   Negative: [1] " "Pain lasts > 10 minutes AND [2] difficulty breathing   Negative: [1] Vomiting AND [2] contains red blood  (Exception: few streaks and only occurred once)   Negative: [1] Vomiting AND [2] contains black ("coffee ground") material   Negative: Blood in bowel movements  (Exception: Blood on surface of BM with constipation)   Negative: Black or tarry bowel movements  (Exception: chronic-unchanged  black-grey bowel movements AND is taking iron pills or Pepto-bismol)   Negative: [1] Pregnant > 24 weeks AND [2] hand or face swelling   Negative: Patient sounds very sick or weak to the triager   Negative: [1] Abdomen pain is main symptom AND [2] adult male   Negative: [1] Abdomen pain is main symptom AND [2] adult female   Negative: Rectal bleeding or blood in stool is main symptom   Negative: Rectal pain or itching is main symptom   Negative: [1] Abdomen pain is main symptom AND [2] adult male   Negative: [1] Abdomen pain is main symptom AND [2] adult female   Negative: Rectal bleeding or blood in stool is main symptom   Negative: Rectal pain or itching is main symptom   Negative: Sounds like a life-threatening emergency to the triager   Negative: [1] Neutropenia known or suspected (e.g., recent cancer chemotherapy) AND [2] fever > 100.4 F (38.0 C)   Negative: Patient sounds very sick or weak to the triager   Negative: [1] Vomiting AND [2] abdomen looks much more swollen than usual   Negative: [1] Vomiting AND [2] contains bile (green color)   Negative: [1] Constant abdominal pain AND [2] present > 2 hours   Negative: [1] Sudden onset rectal pain (straining, rectal pressure or fullness) AND [2] NOT better after SITZ bath, suppository or enema    Protocols used: ABDOMINAL PAIN - MALE-A-AH, CONSTIPATION-A-AH, CANCER - CONSTIPATION-A-AH, ABDOMINAL PAIN - UPPER-A-AH    "

## 2020-04-10 NOTE — TELEPHONE ENCOUNTER
53 Y/O male being treated for lung cancer, calling for constipation. I spoke to his wife she said the he was recntly put on pain medication. Oxycodone and morphine and hasnt had BM in 4-5 days. Pts pain is intermittent and is passing gas at this time. Wife gave him warm prune juice. I told pt to increase fluid intake and metamucil, Pt s wife said that she has metamucil. I was concerned since it has been awhile, Pt wants to wait to go to MD wants to try something at home. If no BM by tomorrow call back and we need him seen. If change in condition please call back.

## 2020-04-10 NOTE — PROGRESS NOTES
Received calls from patient this morning, inquiring about the financial assistance for his co-pay and if he should proceed to the appointment this afternoon for his PET/CT scan, as he hadn't heard from anyone yet.  I informed patient that I had only received a reply from our  Fredi Sanderson that she had put in request for Medicaid screening. I also informed him that I would send additional messages today. I checked his appointment information in [facility] which listed the appointment as being authorized, and I informed patient of this. Advised patient to ask to be billed if the staff requests a co-pay amount from him when he checks in. Patient stated understanding. Pat had already completed the MRI appointment this morning by the time I had spoken with him.  Sent additional message via Epic as well as an email. Received replies from Louise Abdul, Babak Suárez, and Eleanor Richardson all with the [facility]. Patient had been contacted and screening completed by one of the [facility] staff. Babak (Supervisor) will reach out to patient and advise him to apply for Medicaid. He was over the income limit for Expansion Medicaid or continuous Medicaid, but may be eligible for Medically Needy Spend Down Medicaid if he accumulates enough bills within the quarter. Asked that he be referred to [facility] staff for a Social Security Disability application.   Will continue to follow.

## 2020-04-12 NOTE — PROGRESS NOTES
The patient location is: home  The chief complaint leading to consultation is: right lung mass  Visit type: Virtual visit with synchronous audio and video  Total time spent with patient: 20 minutes  Each patient to whom he or she provides medical services by telemedicine is:  (1) informed of the relationship between the physician and patient and the respective role of any other health care provider with respect to management of the patient; and (2) notified that he or she may decline to receive medical services by telemedicine and may withdraw from such care at any time.    Notes: see below      PATIENT: Luis Felipe Hawk  MRN: [id number]  DATE: 4/13/2020      Diagnosis:   1. Adenocarcinoma, lung, right    2. Cancer related pain    3. Therapeutic opioid induced constipation    4. Essential hypertension    5. Type 2 diabetes mellitus without complication, without long-term current use of insulin    6. Coronary artery disease, angina presence unspecified, unspecified vessel or lesion type, unspecified whether native or transplanted heart    7. Cerebrovascular accident (CVA), unspecified mechanism        Chief Complaint: No chief complaint on file.    Oncologic History:      Oncologic History 11/21/19 CXR  3/13/20 CT Chest  4/01/20 IR biopsy  4/09/20 PET/CT    Oncologic Treatment     Pathology 4/01/20 -  adenocarcinoma suggestive of a possible gastrointestinal origin     Initial History:   The patient was initially seen by Dr Carmona on 11/21/19 for a cough and underwent a CXR showing a soft tissue mass in the anterior segment of the right upper lobe that measures at least 4 cm in cranial-caudad extent.  Per chart review an attempt to contact MR Hawk was mad on multiple occasions in order to have a CT of the chest done.  CT of the chest was eventually done on 3/13/20 and showed enlarged right paratracheal LN measuring 2.8 cm, slightly enlarged right hilar nodes, a mass within the anterior segment of the right upper lobe  measuring 3.9 x 4.7 cm abutting the right suprahilar region and obliterating the anterior segmental bronchus.  Subjective:    Interval History: Mr. Hawk is a 54 y.o. male with HTN, HLD, DMII, CAD who presents to follow up for NSCLC adenocarcinoma.  Since the last clinic visit the patient underwent IR guided biopsy of the right lung on 4/01/20 showing adenocarcinoma suggestive of a possible gastrointestinal origin.  MRI of the brain on 4/09/20 showed no evidence of intracranial metastatic disease, remote small infarcts within the right superior frontal lobe and remote lacunar-type infarcts of the bilateral lentiform nuclei and right thalamus.  PET/CT on 4/09/20 showed a 6.5 x 3.7 cm pulmonary mass in the anterior segment of the right upper lobe abutting the mediastinal pleura, large right pretracheal soft tissue mass measuring 4.4 x 3.8 cm, left prevascular lymph node measuring 1.0 cm, few scattered bilateral poorly defined subcentimeter pulmonary nodules, and a right chest wall soft tissue lesion partially eroding the lateral right 3rd rib measuring 2 x 2 cm.  The patient states he continues to have pain in his right chest radiating to his back.  He states he has been taking oxycodone every 4 hours for pain but continues to have pain.  He denies SOB, N/V, diarrhea.  He endorses constipation for which he has been taking prune juice and metamucil.    Past Medical History:   Past Medical History:   Diagnosis Date    CAD (coronary artery disease) 2013    one stent placed    DMII (diabetes mellitus, type 2)     Hypertension     Mass of right lung     Mass of upper lobe of right lung 3/16/2020    3/13/2020 CT chest: Right upper lobe mass obliterating the anterior segmental bronchus. This is associated with right hilar and paramediastinal adenopathy.  This is concerning for bronchogenic carcinoma.  Sampling recommended.       Past Surgical HIstory:   Past Surgical History:   Procedure Laterality Date    CV STENT       X 1    LUNG BIOPSY N/A 4/1/2020    Procedure: BIOPSY, LUNG;  Surgeon: Dagoberto Diagnostic Provider;  Location: [facility] OR;  Service: Radiology;  Laterality: N/A;  730AM STARTRN PHONE PREOP 3/31/2020       Family History:   Family History   Problem Relation Age of Onset    Peripheral vascular disease Mother     Heart disease Father     No Known Problems Brother     No Known Problems Brother     No Known Problems Son     No Known Problems Son     No Known Problems Daughter     No Known Problems Daughter     No Known Problems Daughter        Social History:  reports that he has been smoking cigarettes. He has a 43.00 pack-year smoking history. He has never used smokeless tobacco. He reports that he drinks about 3.0 standard drinks of alcohol per week. He reports that he does not use drugs.    Allergies:  Review of patient's allergies indicates:   Allergen Reactions    Tramadol      Chest burning       Medications:  Current Outpatient Medications   Medication Sig Dispense Refill    albuterol (PROVENTIL/VENTOLIN HFA) 90 mcg/actuation inhaler Inhale 2 puffs into the lungs every 6 (six) hours as needed for Wheezing. Whichever brand covered by insurance 18 g 0    aspirin (ECOTRIN) 81 MG EC tablet Take 1 tablet (81 mg total) by mouth once daily. 30 tablet 11    atorvastatin (LIPITOR) 80 MG tablet Take 1 tablet (80 mg total) by mouth once daily. 90 tablet 3    benzonatate (TESSALON) 100 MG capsule Take 1 capsule (100 mg total) by mouth 3 (three) times daily as needed for Cough. 30 capsule 6    blood-glucose meter kit To check BG 1 times daily, to use with insurance preferred meter 1 each 0    hydroCHLOROthiazide (HYDRODIURIL) 25 MG tablet TK 1 T PO ONE TIME PER DAY FOR BP 90 tablet 3    metFORMIN (GLUCOPHAGE) 500 MG tablet 1 po qAM x 1 wk; then 1 po BID x 1 wk; then 2 AM/1 PM x 1 wk; then 2 BID maintenance. Take with food 120 tablet 5    metoprolol succinate (TOPROL-XL) 25 MG 24 hr tablet Take 1 tablet (25 mg  total) by mouth once daily. 90 tablet 3    morphine (MS CONTIN) 30 MG 12 hr tablet Take 1 tablet (30 mg total) by mouth 2 (two) times daily. 60 tablet 0    oxyCODONE (ROXICODONE) 5 MG immediate release tablet Take 1 tablet (5 mg total) by mouth every 4 (four) hours as needed for Pain. 90 tablet 0    polyethylene glycol (MIRALAX) 17 gram PwPk Take 17 g by mouth 2 (two) times daily as needed (Constipation). 10 each 6    senna-docusate 8.6-50 mg (PERICOLACE) 8.6-50 mg per tablet Take 2 tablets by mouth once daily. 60 tablet 6     No current facility-administered medications for this visit.        Review of Systems   Constitutional: Positive for appetite change and unexpected weight change. Negative for chills, diaphoresis, fatigue and fever.   HENT: Negative for sore throat and trouble swallowing.    Respiratory: Negative for cough and shortness of breath.    Cardiovascular: Negative for chest pain and palpitations.   Gastrointestinal: Negative for abdominal pain, constipation, diarrhea, nausea and vomiting.   Musculoskeletal:        Pain in his lateral right chest radiating to his back     Skin: Negative for color change and rash.   Neurological: Negative for headaches.   Hematological: Negative for adenopathy. Does not bruise/bleed easily.       ECOG Performance Status: 2   Objective:      Vitals: There were no vitals filed for this visit.    Physical Exam   Constitutional: He is oriented to person, place, and time. He appears well-developed and well-nourished. No distress.   Neurological: He is alert and oriented to person, place, and time.   Skin: He is not diaphoretic.   Psychiatric: He has a normal mood and affect.       Laboratory Data:  Hospital Outpatient Visit on 04/09/2020   Component Date Value Ref Range Status    POCT Glucose 04/09/2020 239* 70 - 110 mg/dL Final         Imaging: CT Chest 3/13/20    Right paratracheal and large node measuring 2.8 cm, series 2, image 31.  No subcarinal or left hilar  nodes.  There are slightly enlarged right hilar nodes.  The thoracic aorta is of normal caliber, there is no abnormal atherosclerotic plaque.  No pericardial effusion.  Mosaic attenuation bilaterally suggestive of underlying inflammatory small airway disease.  There is a mass within the anterior segment of the right upper lobe measuring 3.9 x 4.7 cm series 4, image 220.  The pulmonary vessels appear to be patent.    The mass abuts the right suprahilar region.  It appears to obliterate the anterior segmental bronchus.  It abuts the right paramediastinal region.  No pleural effusion.  No pneumothorax.  The axillary regions appear normal.    The upper abdominal organs demonstrate no abnormalities, the adrenal glands appear normal.  The osseous structures demonstrate no osseous lesions.    PET/CT 4/09/20    Head and neck:    -Right supraclavicular lymph node measures 1.5 cm short axis (image 33) with SUV max 12.  No additional hypermetabolic lesions in the head or neck.    Thorax:    Large right upper lobe pulmonary mass is present with several enlarged hypermetabolic mediastinal lymph nodes.  Index lesions are as follows:    - 6.5 x 3.7 cm (image 61) pulmonary mass in the anterior segment of the right upper lobe abutting the mediastinal pleura with hypermetabolic activity and SUV max of 8.9.    -Large right pretracheal soft tissue mass (likely conglomeration of lymph nodes) measures 4.4 x 3.8 cm (image 46) with SUV max 13.    -Left prevascular lymph node measures 1.0 cm short axis (image 50) with SUV max 11.    There are a few scattered bilateral poorly defined subcentimeter pulmonary nodules that are too small for detection by PET.  Persistent bibasilar pulmonary mosaic attenuation, suggestive of underlying inflammatory small airways disease.    Abdomen and pelvis:    No abnormal foci of radiotracer uptake concerning for malignancy.    Incidental:    Scattered calcification throughout the abdominal aorta and more  prominently within the bilateral renal arteries.    Osseous structures:    Right chest wall soft tissue lesion partially eroding the lateral right 3rd rib measures 2 x 2 cm (image 49) with SUV max 21.    MRI Brain 4/09/20    Intracranial compartment:    Brain parenchyma demonstrates mild generalized cerebral volume loss. Scattered punctate T2/FLAIR hyperintensities are present throughout the supratentorial white matter suggestive of mild chronic microvascular ischemic change.  There are 2 small regions of encephalomalacia within the right superior frontal lobe compatible with remote infarcts.  Small cystic-appearing spaces within the bilateral lentiform nuclei and right thalamus likely represent small lacunar type infarcts.  There is a small developmental venous anomaly within the left periventricular posterior temporal lobe.  No evidence of acute infarction.  No parenchymal hemorrhage, mass lesion, or edema.  No abnormal enhancement.  No mass effect or midline shift.    No evidence of hydrocephalus.    No extra-axial blood products or fluid collections.    Skull base T2 vascular flow voids are preserved.    Skull/extracranial contents (limited evaluation): Bone marrow signal intensity is normal.     Assessment:       1. Adenocarcinoma, lung, right    2. Cancer related pain    3. Therapeutic opioid induced constipation    4. Essential hypertension    5. Type 2 diabetes mellitus without complication, without long-term current use of insulin    6. Coronary artery disease, angina presence unspecified, unspecified vessel or lesion type, unspecified whether native or transplanted heart    7. Cerebrovascular accident (CVA), unspecified mechanism           Plan:     NSCLC - biopsy of the right lung on 4/01/20 showed adenocarcinoma suggestive of GI origin  -PET/CT on 4/09/20 showed 6.5 x 3.7 cm pulmonary mass in the anterior segment of the right upper lobe abutting the mediastinal pleura, large right pretracheal soft tissue  mass measuring 4.4 x 3.8 cm, left prevascular lymph node measuring 1.0 cm, few scattered bilateral poorly defined subcentimeter pulmonary nodules, and a right chest wall soft tissue lesion partially eroding the lateral right 3rd rib measuring 2 x 2 cm  -Clinically the patient is behaving like a primary lung cancer  -I spoke with Dr Fregoso whom stated the pathologic pattern could be seen in an enteric lung primary  -Will proceed with testing for EGFR, ALK, ROS-1, PD-L1, BRAF  -Pt wishes to postpone PORT placement at this time until it is determined that he is not a candidate for oral treatment options.    Cancer Related pain - the patient is taking MS Contin 15 mg BID and oxycodone every 4 hours without relief.  -Will increase Ms contin to 30mg BID  -May consider radiation to the right chest wall in the future if pain remains difficult to control.    Opioid Induced Constipation - Pt instructed to start taking senokot daily and miralax as needed  -Pt informed constiaption was from opioids    HTN - pt on HCTZ and metoprolol  -Will monitor    HLD - pt on lipitor  -PCP managing    DMII - pt's A1C is 10.1 on 3/13/20  -Pt on metformin  -Will monitor    CAD -  Pt on statin, ASA, metoprolol    CVA - recent MRI of the brain on 4/09/20 showed old infarcts in the right superior frontal lobe and remote lacunar-type infarcts of the bilateral lentiform nuclei and right thalamus  -Pt already on ASA and statin  -Will schedule the patient to see vascular neurology    Anorexia - pt started on periactin 4mg 4 times daily  -Will monitor    -Follow Up - 2 weeks to discuss results of molecular testing.    Richar Avendaño MD  Hematology and Oncology  [facility]  Office:[phone]  Fax: [phone]

## 2020-04-13 ENCOUNTER — OFFICE VISIT (OUTPATIENT)
Dept: HEMATOLOGY/ONCOLOGY | Facility: CLINIC | Age: 55
End: 2020-04-13
Payer: COMMERCIAL

## 2020-04-13 DIAGNOSIS — G89.3 CANCER RELATED PAIN: ICD-10-CM

## 2020-04-13 DIAGNOSIS — C34.91 ADENOCARCINOMA, LUNG, RIGHT: Primary | ICD-10-CM

## 2020-04-13 DIAGNOSIS — E11.9 TYPE 2 DIABETES MELLITUS WITHOUT COMPLICATION, WITHOUT LONG-TERM CURRENT USE OF INSULIN: ICD-10-CM

## 2020-04-13 DIAGNOSIS — K59.03 THERAPEUTIC OPIOID INDUCED CONSTIPATION: ICD-10-CM

## 2020-04-13 DIAGNOSIS — I25.10 CORONARY ARTERY DISEASE, ANGINA PRESENCE UNSPECIFIED, UNSPECIFIED VESSEL OR LESION TYPE, UNSPECIFIED WHETHER NATIVE OR TRANSPLANTED HEART: ICD-10-CM

## 2020-04-13 DIAGNOSIS — I63.9 CEREBROVASCULAR ACCIDENT (CVA), UNSPECIFIED MECHANISM: ICD-10-CM

## 2020-04-13 DIAGNOSIS — I10 ESSENTIAL HYPERTENSION: ICD-10-CM

## 2020-04-13 DIAGNOSIS — R63.0 ANOREXIA: ICD-10-CM

## 2020-04-13 DIAGNOSIS — T40.2X5A THERAPEUTIC OPIOID INDUCED CONSTIPATION: ICD-10-CM

## 2020-04-13 LAB — POCT GLUCOSE: 239 MG/DL (ref 70–110)

## 2020-04-13 PROCEDURE — 99214 OFFICE O/P EST MOD 30 MIN: CPT | Mod: 95,,, | Performed by: INTERNAL MEDICINE

## 2020-04-13 PROCEDURE — 2024F 7 FLD RTA PHOTO EVC RTNOPTHY: CPT | Mod: ,,, | Performed by: INTERNAL MEDICINE

## 2020-04-13 PROCEDURE — 99214 PR OFFICE/OUTPT VISIT, EST, LEVL IV, 30-39 MIN: ICD-10-PCS | Mod: 95,,, | Performed by: INTERNAL MEDICINE

## 2020-04-13 PROCEDURE — 2024F PR 7 FIELD PHOTOS WITH INTERP/ REVIEW: ICD-10-PCS | Mod: ,,, | Performed by: INTERNAL MEDICINE

## 2020-04-13 RX ORDER — CYPROHEPTADINE HYDROCHLORIDE 4 MG/1
4 TABLET ORAL 4 TIMES DAILY
Qty: 120 TABLET | Refills: 0 | Status: SHIPPED | OUTPATIENT
Start: 2020-04-13 | End: 2020-07-02 | Stop reason: SDUPTHER

## 2020-04-13 RX ORDER — MORPHINE SULFATE 30 MG/1
30 TABLET, FILM COATED, EXTENDED RELEASE ORAL 2 TIMES DAILY
Qty: 60 TABLET | Refills: 0 | Status: SHIPPED | OUTPATIENT
Start: 2020-04-13 | End: 2020-04-27 | Stop reason: DRUGHIGH

## 2020-04-13 RX ORDER — AMOXICILLIN 250 MG
2 CAPSULE ORAL DAILY
Qty: 60 TABLET | Refills: 6 | Status: SHIPPED | OUTPATIENT
Start: 2020-04-13 | End: 2020-09-11

## 2020-04-13 RX ORDER — POLYETHYLENE GLYCOL 3350 17 G/17G
17 POWDER, FOR SOLUTION ORAL 2 TIMES DAILY PRN
Qty: 10 EACH | Refills: 6 | Status: SHIPPED | OUTPATIENT
Start: 2020-04-13 | End: 2020-06-02 | Stop reason: SDUPTHER

## 2020-04-13 NOTE — Clinical Note
Please schedule the patient to see vascular surgery for old stroke seen on recent MRI.  The patient's PORT placement for 4/15/20 needs to be cancelled.  Please schedule the patient to follow up with me in 2 weeks with a telemedicine visit with CBC and CMP prior to the visit.

## 2020-04-14 ENCOUNTER — PATIENT OUTREACH (OUTPATIENT)
Dept: ADMINISTRATIVE | Facility: HOSPITAL | Age: 55
End: 2020-04-14

## 2020-04-15 ENCOUNTER — PATIENT OUTREACH (OUTPATIENT)
Dept: ADMINISTRATIVE | Facility: OTHER | Age: 55
End: 2020-04-15

## 2020-04-16 ENCOUNTER — OFFICE VISIT (OUTPATIENT)
Dept: VASCULAR SURGERY | Facility: CLINIC | Age: 55
End: 2020-04-16
Payer: COMMERCIAL

## 2020-04-16 DIAGNOSIS — F17.218 NICOTINE DEPENDENCE, CIGARETTES, WITH OTHER NICOTINE-INDUCED DISORDERS: ICD-10-CM

## 2020-04-16 DIAGNOSIS — I63.9 CEREBROVASCULAR ACCIDENT (CVA), UNSPECIFIED MECHANISM: ICD-10-CM

## 2020-04-16 DIAGNOSIS — Z86.73 CHRONIC CEREBROVASCULAR ACCIDENT (CVA): Primary | ICD-10-CM

## 2020-04-16 PROCEDURE — 99244 PR OFFICE CONSULTATION,LEVEL IV: ICD-10-PCS | Mod: 95,,, | Performed by: SURGERY

## 2020-04-16 PROCEDURE — 99244 OFF/OP CNSLTJ NEW/EST MOD 40: CPT | Mod: 95,,, | Performed by: SURGERY

## 2020-04-16 NOTE — PATIENT INSTRUCTIONS
Carotid Artery Problems: Blockage     A healthy carotid artery lets blood flow easily to the brain.   The blood carries oxygen and nutrients throughout the body. The carotid arteries are large blood vessels that carry blood to the brain. Certain health problems can make the inside of the carotid arteries narrow and rough. Over time, this damage increases the chances of having a stroke. A stroke is a sudden loss of brain function.   Open carotid arteries  In a healthy carotid artery, the inside of the artery is open. The lining of the artery wall is also smooth. This lets blood flow freely from the heart to the brain. The brain gets all the oxygen and nutrients it needs to function well.  Narrowed carotid arteries  Health factors, such as high blood pressure, high cholesterol, smoking, and diabetes, can damage artery walls and make them rough. This allows cholesterol and other particles in the blood to stick to the artery walls and form plaque or fatty deposits. As the plaque builds up, it can narrow the artery. Blood may also collect on the plaque and form blood clots.  How a stroke happens     Emboli can enter the bloodstream and travel to the brain. Brain tissue is damaged when emboli block arteries in the brain.   A stroke can happen when plaque in the carotid artery ruptures. This can allow small pieces of plaque to break off into the bloodstream. At the same time, rupture can make more blood clots. The pieces of plaque and tiny blood clots or emboli can flow in the blood until they get stuck in a small blood vessel in the brain. This blocks blood flow to part of the brain and causes a stroke.  Date Last Reviewed: 6/8/2015  © 4780-6701 [employer]. [address] [place] [address], [place] [address]. All rights reserved. This information is not intended as a substitute for professional medical care. Always follow your healthcare professional's instructions.

## 2020-04-16 NOTE — LETTER
April 16, 2020      Richar Avendaño MD  [address] [facility] [address]  [address]  [facility] [address]           [facility]  [address]  [facility] [address]  Phone: [phone]  Fax: [phone]          Patient: Luis Felipe Hawk   MR Number: [id number]   YOB: 1965   Date of Visit: 4/16/2020       Dear Dr. Richar Avendaño:    Thank you for referring Luis Felipe Hawk to me for evaluation. Attached you will find relevant portions of my assessment and plan of care.    If you have questions, please do not hesitate to call me. I look forward to following Luis Felipe Hawk along with you.    Sincerely,    Michele Bennett MD    Enclosure  CC:  No Recipients    If you would like to receive this communication electronically, please contact [email or web address] or [phone] to request more information on [employer] access.    For providers and/or their staff who would like to refer a patient to [facility], please contact us through our one-stop-shop provider referral line, [facility], at [phone].    If you feel you have received this communication in error or would no longer like to receive these types of communications, please e-mail [email or web address]

## 2020-04-17 ENCOUNTER — TELEPHONE (OUTPATIENT)
Dept: FAMILY MEDICINE | Facility: CLINIC | Age: 55
End: 2020-04-17

## 2020-04-17 ENCOUNTER — TELEPHONE (OUTPATIENT)
Dept: HEMATOLOGY/ONCOLOGY | Facility: CLINIC | Age: 55
End: 2020-04-17

## 2020-04-17 DIAGNOSIS — C34.91 ADENOCARCINOMA, LUNG, RIGHT: Primary | ICD-10-CM

## 2020-04-17 DIAGNOSIS — F32.A DEPRESSION, UNSPECIFIED DEPRESSION TYPE: Primary | ICD-10-CM

## 2020-04-17 NOTE — TELEPHONE ENCOUNTER
----- Message from Hansa Beltrán sent at 4/17/2020  8:51 AM CDT -----  Contact: SpouseAdi Alanis   Type: Patient Call Back    Who called: Salo Alanis     What is the request in detail: Salo Alanis is requesting a call back in regards to the pt mental health. SpouseAdi Alanis states that she believes the pt needs professional health.     Can the clinic reply by [id number]?    Would the patient rather a call back or a response via [facility]? Call back     Best call back number: [phone]  [phone]    Salo Alanis states that Ms. Aviles (the pt ) told them to reach out to the pt PCP.

## 2020-04-17 NOTE — TELEPHONE ENCOUNTER
I called the patient and he stated he was still having a lot of pain in his right chest.  I told him that I agreed with Dr Carmona's recommendation to take 2 oxycodone at a time if his pain persists.  We discussed the possibility of increasing his MS contin next week.  We also discussed the possibility of referring him to radiation oncology in the future as well.  I also informed him that we would set him up to see an oncology psychologist.  He agreed to see them.  The patient expressed understanding.  All questions were answered to his satisfaction.    Richar Avendaño MD  Hematology and Oncology  [facility]  Office:[phone]  Fax: [phone]

## 2020-04-17 NOTE — TELEPHONE ENCOUNTER
Spoke with wife, she thinks he is depressed, he does tend to hold his feelings in.  Gave them # for [facility] department.     He notes pain continues, takes the ms contin 30 bid and the oxycodone 5 mg every 4 hours but still with pain.  told him he can increase the oxycodone to 10 mg every 4 hours.

## 2020-04-17 NOTE — TELEPHONE ENCOUNTER
Spoke with patient wife Annabelle she states she spoken with someone who is like a  with [facility] she suggested for patient to reach out to pcp for extra support. Patient wife is requesting a referral to help cope with diagnosis and not be depressed. Please advise

## 2020-04-20 ENCOUNTER — TELEPHONE (OUTPATIENT)
Dept: HEMATOLOGY/ONCOLOGY | Facility: CLINIC | Age: 55
End: 2020-04-20

## 2020-04-20 DIAGNOSIS — G89.3 CANCER RELATED PAIN: ICD-10-CM

## 2020-04-20 RX ORDER — OXYCODONE HYDROCHLORIDE 5 MG/1
5 TABLET ORAL EVERY 4 HOURS PRN
Qty: 90 TABLET | Refills: 0 | Status: SHIPPED | OUTPATIENT
Start: 2020-04-20 | End: 2020-05-13

## 2020-04-20 NOTE — TELEPHONE ENCOUNTER
I called the patient and he stated that he had misplaced the medication. I informed him of the seriousness of losing prescription opioids and the concern for the risk of abuse.  I informed him that I would send in a refill this time and that I would not be able to do this in the future.  The patient expressed understanding.  All questions were answered to his satisfaction.    Richar Avendaño MD  Hematology and Oncology  [facility]  Office:[phone]  Fax: [phone]

## 2020-04-20 NOTE — TELEPHONE ENCOUNTER
The patient caregiver is calling stating that she lost the patient pain medication and asking for a new Rx of Oxycodone.Also that you call her

## 2020-04-23 LAB
FINAL PATHOLOGIC DIAGNOSIS: NORMAL
GROSS: NORMAL
MICROSCOPIC EXAM: NORMAL
SUPPLEMENTAL DIAGNOSIS: NORMAL

## 2020-04-24 ENCOUNTER — LAB VISIT (OUTPATIENT)
Dept: LAB | Facility: HOSPITAL | Age: 55
End: 2020-04-24
Attending: INTERNAL MEDICINE
Payer: MEDICAID

## 2020-04-24 ENCOUNTER — PATIENT OUTREACH (OUTPATIENT)
Dept: ADMINISTRATIVE | Facility: OTHER | Age: 55
End: 2020-04-24

## 2020-04-24 ENCOUNTER — OFFICE VISIT (OUTPATIENT)
Dept: PSYCHIATRY | Facility: CLINIC | Age: 55
End: 2020-04-24
Payer: COMMERCIAL

## 2020-04-24 DIAGNOSIS — F43.23 ADJUSTMENT DISORDER WITH MIXED ANXIETY AND DEPRESSED MOOD: Primary | ICD-10-CM

## 2020-04-24 DIAGNOSIS — R73.09 ABNORMAL GLUCOSE: ICD-10-CM

## 2020-04-24 DIAGNOSIS — Z84.89 HEALTH PROBLEM IN FAMILY: ICD-10-CM

## 2020-04-24 DIAGNOSIS — Z72.0 TOBACCO ABUSE DISORDER: ICD-10-CM

## 2020-04-24 DIAGNOSIS — C34.91 ADENOCARCINOMA, LUNG, RIGHT: ICD-10-CM

## 2020-04-24 LAB
ALBUMIN SERPL BCP-MCNC: 3 G/DL (ref 3.5–5.2)
ALP SERPL-CCNC: 116 U/L (ref 55–135)
ALT SERPL W/O P-5'-P-CCNC: 29 U/L (ref 10–44)
ANION GAP SERPL CALC-SCNC: 13 MMOL/L (ref 8–16)
AST SERPL-CCNC: 17 U/L (ref 10–40)
BASOPHILS # BLD AUTO: 0.02 K/UL (ref 0–0.2)
BASOPHILS NFR BLD: 0.2 % (ref 0–1.9)
BILIRUB SERPL-MCNC: 0.5 MG/DL (ref 0.1–1)
BUN SERPL-MCNC: 8 MG/DL (ref 6–20)
CALCIUM SERPL-MCNC: 9.3 MG/DL (ref 8.7–10.5)
CHLORIDE SERPL-SCNC: 94 MMOL/L (ref 95–110)
CO2 SERPL-SCNC: 27 MMOL/L (ref 23–29)
CREAT SERPL-MCNC: 0.9 MG/DL (ref 0.5–1.4)
DIFFERENTIAL METHOD: ABNORMAL
EOSINOPHIL # BLD AUTO: 0.1 K/UL (ref 0–0.5)
EOSINOPHIL NFR BLD: 1.1 % (ref 0–8)
ERYTHROCYTE [DISTWIDTH] IN BLOOD BY AUTOMATED COUNT: 12.7 % (ref 11.5–14.5)
EST. GFR  (AFRICAN AMERICAN): >60 ML/MIN/1.73 M^2
EST. GFR  (NON AFRICAN AMERICAN): >60 ML/MIN/1.73 M^2
ESTIMATED AVG GLUCOSE: 220 MG/DL (ref 68–131)
GLUCOSE SERPL-MCNC: 141 MG/DL (ref 70–110)
HBA1C MFR BLD HPLC: 9.3 % (ref 4–5.6)
HCT VFR BLD AUTO: 39.5 % (ref 40–54)
HGB BLD-MCNC: 12.9 G/DL (ref 14–18)
IMM GRANULOCYTES # BLD AUTO: 0.02 K/UL (ref 0–0.04)
IMM GRANULOCYTES NFR BLD AUTO: 0.2 % (ref 0–0.5)
LYMPHOCYTES # BLD AUTO: 3.3 K/UL (ref 1–4.8)
LYMPHOCYTES NFR BLD: 32.3 % (ref 18–48)
MCH RBC QN AUTO: 27.3 PG (ref 27–31)
MCHC RBC AUTO-ENTMCNC: 32.7 G/DL (ref 32–36)
MCV RBC AUTO: 84 FL (ref 82–98)
MONOCYTES # BLD AUTO: 1.4 K/UL (ref 0.3–1)
MONOCYTES NFR BLD: 13.6 % (ref 4–15)
NEUTROPHILS # BLD AUTO: 5.4 K/UL (ref 1.8–7.7)
NEUTROPHILS NFR BLD: 52.6 % (ref 38–73)
NRBC BLD-RTO: 0 /100 WBC
PLATELET # BLD AUTO: 351 K/UL (ref 150–350)
PMV BLD AUTO: 9.1 FL (ref 9.2–12.9)
POTASSIUM SERPL-SCNC: 3.3 MMOL/L (ref 3.5–5.1)
PROT SERPL-MCNC: 7.7 G/DL (ref 6–8.4)
RBC # BLD AUTO: 4.73 M/UL (ref 4.6–6.2)
SODIUM SERPL-SCNC: 134 MMOL/L (ref 136–145)
WBC # BLD AUTO: 10.28 K/UL (ref 3.9–12.7)

## 2020-04-24 PROCEDURE — 90791 PSYCH DIAGNOSTIC EVALUATION: CPT | Mod: 95,,, | Performed by: PSYCHOLOGIST

## 2020-04-24 PROCEDURE — 83036 HEMOGLOBIN GLYCOSYLATED A1C: CPT

## 2020-04-24 PROCEDURE — 80053 COMPREHEN METABOLIC PANEL: CPT

## 2020-04-24 PROCEDURE — 36415 COLL VENOUS BLD VENIPUNCTURE: CPT

## 2020-04-24 PROCEDURE — 85025 COMPLETE CBC W/AUTO DIFF WBC: CPT

## 2020-04-24 PROCEDURE — 90791 PR PSYCHIATRIC DIAGNOSTIC EVALUATION: ICD-10-PCS | Mod: 95,,, | Performed by: PSYCHOLOGIST

## 2020-04-24 NOTE — Clinical Note
Leah met with this patient. His partner Annabelle, has an appt with you on Tuesday. I will talk to you about her. Its a lot. She may not have video, and may need a phone call

## 2020-04-24 NOTE — PROGRESS NOTES
INFORMED CONSENT/ LIMITS of CONFIDENTIALITY: Prior to beginning the interview, the patient's identification was confirmed via name and date of birth. Luis Felipe Hawk  was informed of the possible risks and benefits of psychological interventions (e.g., counseling, psychotherapy, testing) and provided information regarding the handling of protected health records and the limits of confidentiality, including the importance of reporting any suicidal or homicidal ideation to ensure safety of all parties. This provider explained the purpose of today's appointment and the patient was provided with time to ask questions regarding this information.  Acceptance and understanding of these conditions was expressed, and Luis Felipe Hawk freely consented to this evaluation.       TELEMEDICINE PSYCHO-ONCOLOGY INTAKE    Consultation started: 4/24/2020 at 8:08 AM   The chief complaint leading to consultation is: depression, and psych recommendation  Virtual visit with synchronous audio and video   The patient location is:  Patient home     Also present with the patient at the time of the consultation: spouse    Each patient provided medical services by telemedicine is:  (1) informed of the relationship between the physician and patient and the respective role of any other health care provider with respect to management of the patient; and (2) notified that he or she may decline to receive medical services by telemedicine and may withdraw from such care at any time    Crisis Disclaimer: Patient was informed that due to the virtual nature of the visit, that if a crisis develops, protocols will be implemented to ensure patient safety, including but not limited to: 1) Initiating a welfare check with local Law Enforcement, 2) Calling 911/National Crisis Hotline, and/or 3) Initiating PEC/CEC procedures.     Diagnostic Interview - CPT 82832    Date: 4/24/2020  Site of Clinician: [facility]     Evaluation Length (direct face-to-face  time):  1 hour     Referral Source: Richar Avendaño MD   Oncologist:   PCP: El Carmona MD    Clinical status of patient: Outpatient    Luis Felipe Hawk, a 54 y.o. male, seen for initial evaluation visit.  Met with patient.    Chief complaint/reason for encounter: adjustment to illness, depression and Psychological Evaluation and treatment recommendations    Medical/Surgical History:    Patient Active Problem List   Diagnosis    Coronary artery disease involving native coronary artery of native heart with angina pectoris 2013 [facility] and stent mLCx    Smoker 1 PPD    Essential hypertension    Dyslipidemia    Type 2 diabetes mellitus without complication, without long-term current use of insulin    Adenocarcinoma, lung, right    Mass of right lung       Health Behaviors:       ETOH Use: No (past social) Quit in January      Tobacco Use: Yes. 1 pack per day. Actively   Illicit Drug Use:  No     Prescription Misuse:No   Caffeine: Coffee and Soda- 2-3 per day   Exercise:The patient engages in little, if any physical activity.   Firearms:  No   Advanced directives:No     Family History:   Psychiatric illness: No     Alcohol/Drug Abuse: No     Suicide: No      Past Psychiatric History:   Inpatient treatment: No     Outpatient treatment: No     Prior substance abuse treatment: No     Suicide Attempts: No     Psychotropic Medications:  Current: none       Past: none    Current medications as per below, allergies reviewed in chart.    Current Outpatient Medications   Medication    albuterol (PROVENTIL/VENTOLIN HFA) 90 mcg/actuation inhaler    aspirin (ECOTRIN) 81 MG EC tablet    atorvastatin (LIPITOR) 80 MG tablet    benzonatate (TESSALON) 100 MG capsule    blood-glucose meter kit    cyproheptadine (PERIACTIN) 4 mg tablet    hydroCHLOROthiazide (HYDRODIURIL) 25 MG tablet    metFORMIN (GLUCOPHAGE) 500 MG tablet    metoprolol succinate (TOPROL-XL) 25 MG 24 hr tablet    morphine (MS CONTIN) 30 MG 12 hr tablet     oxyCODONE (ROXICODONE) 5 MG immediate release tablet    polyethylene glycol (MIRALAX) 17 gram PwPk    senna-docusate 8.6-50 mg (PERICOLACE) 8.6-50 mg per tablet     No current facility-administered medications for this visit.         CAM Therapies: None     Screening: Depression: Positive  Marilia: Denies Psychosis: Denies    Generalized anxiety: Mild  Panic Disorder: Denies    Social/specific phobia: Denies OCD: Denies   Sexual Dysfunction:  Denies Trauma: Denies      Social situation/Stressors: Luis Felipe Hawk lives with partner in [place].  He has not worked since November as a  due to a work-related injury. He is currently on Worker Comp  He has been in his job for 30 years. He has been with his currently partner for 3 years.   Luis Felipe Hawk has 5 children.  His partner is not working.   The patient reports fair social support.  Luis Felipe Hawk is spiritual, but not [employer].  Luis Felipe Hawk's hobbies include television, sitting on the river.  Additional stressors:  Partner with SUDs and mental health issues    Strengths:Able to vocalize needs and Cultural/spiritual/[employer]aritan and community involvement  Liabilities: Complicated medical illness and Other: Current Tob Use; Social Support Issues    Current Evaluation:     Mental Status Exam: Luis Felipe Hawk arrived promptly for the assessment session. Annabelle, his partner, was also present during the interview, with the consent of Luis Felipe Hawk.  The patient was fully cooperative throughout the interview and was an adequate historian   Appearance: age appropriate, inappropriately  dressed (partially naked), adequately  groomed; lying in bed  Behavior/Cooperation: guarded; limited engagement  Speech: normal in rate, volume, and tone and appropriate quality, quantity and organization of sentences  Mood: dysthymic  Affect: flat  Thought Process: goal-directed, logical  Thought Content: normal, no suicidality, no homicidality, delusions, or paranoia;did not appear to  be responding to internal stimuli during the interview.   Orientation: grossly intact  Memory: Grossly intact  Attention Span/Concentration: Attends to interview without distraction; reports no difficulty  Fund of Knowledge: average  Estimate of Intelligence: average from verbal skills and history  Cognition: grossly intact  Insight: fair   Judgment: the patient's behavior is adequate to circumstances      Distress Score    Distress Score: 5        Practical Problems Physical Problems                                                   Family Problems             Dealing with Partner: Yes              Family Health Issues: Yes            Emotional Problems      Depression: Yes       Fears: Yes          Pain: Yes           Worry: Yes      Loss of Interest in Usual Activities: Yes Sleep: Yes          Spiritual/Religions Concerns               Other Problems            Pain: 4    History of present illness:       Adenocarcinoma, lung, right    3/16/2020 Initial Diagnosis     Adenocarcinoma, lung, right      4/13/2020 Cancer Staged     Staging form: Lung, AJCC 8th Edition  - Clinical stage from 4/13/2020: Stage IV (cT3, cN3, cM1a)       Patient reported having worries related to dying from cancer. Patient also reported significant pain in chest (cancer-related), back, and shoulders. Patient reported that pain is most related to depressed mood in terms of not wanting to get out of bed, not wanting to eat, and sleeping. Patient is currently engaging in some coping skills including taking rides daily and sitting on the river for quiet time.     Patient's wife reported that she is a significant source of his stress due to her substances abuse (Crack-Cocaine). Patient stated that this is a significant worry for him due his concerns about her health.     Luis Felipe Hawk has adjusted to illness with difficulty primarily through passive coping strategies. He has engaged in limited information gathering.  The patient has fair  family/friend support.  His support system is coping poorly with the diagnosis/treatment/prognosis. Illness-related psychosocial stressors include difficulty meeting family responsibilities.  The patient has a good partnership with his [facility] oncology treatment team. The patient reports the following barriers to cancer care:none.     Symptoms:   · Mood: depressed mood, diminished interest, insomnia, fatigue and poor concentration;  no prior and no SI/HI  · Anxiety: Feeling nervous, anxious, or on edge, Uncontrollable worry (about healht and partner), Excessive worry (interfering with daily functioning and sleep) and Irritability; no prior  · Substance abuse: denied  · Cognitive functioning: none  · Health behaviors: noncontributory   Sleep: 4-5 hours per night  ·  interrupted sleep and sleep interrupted by pain , 30 min+ extended sleep onset latency and several x WASO (with re-onset difficulty), (+) EDS  and occasional naps, AM caffeine, PM caffeine, (+) sleep hygiene considerations and (+) psychophysiological factors no use of OTC/melatonin/hypnotics/benzodiazepines    · Pain: Mr. Hawk reports  shoulder pain from an work-related injury. Onset of symptoms was abrupt starting several months ago with gradually improving course since that time. It was related to no known injury and work injury.  The pain is described as aching. Symptoms interfere with daily activity, sleeping and work.       Assessment - Diagnosis - Goals:       ICD-10-CM ICD-9-CM   1. Adjustment disorder with mixed anxiety and depressed mood F43.23 309.28   2. Adenocarcinoma, lung, right C34.91 162.9   3. Health problem in family Z84.89 V19.8       Plan:individual psychotherapy and patient declined psychotropic medication, referral to Tob Cessation program; Patient is willing to try medication options to quit smoking    Summary and Recommendations  Luis Felipe Hawk is a 54 y.o. male referred by Dr. Avendaño for psychological evaluation and treatment.    Carine appears to be coping poorly with his diagnosis and proposed treatment course. Patient also have significant psychosocial stressors regarding his partner substance use disorder. He stated that he is somewhat motivated to reduce his tobacco use but is unsure how to do so.  He is interested in CBT to address depression/anxiety/insomnia and will follow up with me for that purpose. Mood protective strategies during cancer treatment were discussed.   He was encouraged to continue with pleasant events scheduling and to increase exercise.     GOALS:   Increase exercise  Tob Cessation      Komal Cuevas, PhD  Clinical Psychologist  [place] License #[id number]

## 2020-04-24 NOTE — PLAN OF CARE
START ON PATHWAY REGIMEN - Non-Small Cell Lung    [id number]        Pembrolizumab (Keytruda)       Pemetrexed (Alimta)       Carboplatin (Paraplatin)           Additional Orders: Begin folic acid and vitamin B12 supplementation, and   dexamethasone prior to initiation of pemetrexed - see PI for details. In   studies, patients received up to 4 cycles of pembrolizumab + pemetrexed +   carboplatin followed by pembrolizumab up to a total of 35 cycles and pemetrexed   indefinitely until disease progression or toxicity. Serious immune-mediated   adverse events can occur with pembrolizumab. Please monitor your patient and   refer to the linked immune-mediated adverse reaction management materials for   more information.    **Always confirm dose/schedule in your pharmacy ordering system**    Patient Characteristics:  Stage IV Metastatic, Nonsquamous, Initial Chemotherapy/Immunotherapy, PS = 0, 1,   ALK Rearrangement Negative and EGFR Mutation Negative/Non-Sensitizing, PD-L1   Expression Positive 1-49% (TPS) / Negative / Not Tested / Awaiting Test Results   and Immunotherapy Candidate  AJCC T Category: T3  Current Disease Status: Distant Metastases  AJCC N Category: N3  AJCC M Category: M1a  AJCC 8 Stage Grouping: EMELINA  Histology: Nonsquamous Cell  ROS1 Rearrangement Status: Negative  T790M Mutation Status: Not Applicable - EGFR Mutation Negative/Unknown  Other Mutations/Biomarkers: No Other Actionable Mutations  NTRK Gene Fusion Status: Negative  PD-L1 Expression Status: PD-L1 Negative  Chemotherapy/Immunotherapy LOT: Initial Chemotherapy/Immunotherapy  Molecular Targeted Therapy: Not Appropriate  ALK Rearrangement Status: Negative  EGFR Mutation Status: Negative/Wild Type  BRAF V600E Mutation Status: Negative  ECOG Performance Status: 0  Immunotherapy Candidate Status: Candidate for Immunotherapy  Intent of Therapy:  Non-Curative / Palliative Intent, Not Discussed with Patient

## 2020-04-26 NOTE — PROGRESS NOTES
a1c modest improved compared to last month. On metformin  His biggest priority is the cancer in the lung  Results to pt via my [facility]. No changes in DM meds

## 2020-04-27 ENCOUNTER — TELEPHONE (OUTPATIENT)
Dept: INTERVENTIONAL RADIOLOGY/VASCULAR | Facility: HOSPITAL | Age: 55
End: 2020-04-27

## 2020-04-27 ENCOUNTER — OFFICE VISIT (OUTPATIENT)
Dept: ENDOCRINOLOGY | Facility: CLINIC | Age: 55
End: 2020-04-27
Payer: COMMERCIAL

## 2020-04-27 ENCOUNTER — PATIENT MESSAGE (OUTPATIENT)
Dept: DIABETES | Facility: CLINIC | Age: 55
End: 2020-04-27

## 2020-04-27 ENCOUNTER — OFFICE VISIT (OUTPATIENT)
Dept: HEMATOLOGY/ONCOLOGY | Facility: CLINIC | Age: 55
End: 2020-04-27
Payer: COMMERCIAL

## 2020-04-27 DIAGNOSIS — R06.02 SHORTNESS OF BREATH: ICD-10-CM

## 2020-04-27 DIAGNOSIS — I10 ESSENTIAL HYPERTENSION: ICD-10-CM

## 2020-04-27 DIAGNOSIS — T40.2X5A THERAPEUTIC OPIOID INDUCED CONSTIPATION: ICD-10-CM

## 2020-04-27 DIAGNOSIS — R63.0 ANOREXIA: ICD-10-CM

## 2020-04-27 DIAGNOSIS — I25.10 CORONARY ARTERY DISEASE, ANGINA PRESENCE UNSPECIFIED, UNSPECIFIED VESSEL OR LESION TYPE, UNSPECIFIED WHETHER NATIVE OR TRANSPLANTED HEART: ICD-10-CM

## 2020-04-27 DIAGNOSIS — K59.03 THERAPEUTIC OPIOID INDUCED CONSTIPATION: ICD-10-CM

## 2020-04-27 DIAGNOSIS — Z72.0 TOBACCO ABUSE: ICD-10-CM

## 2020-04-27 DIAGNOSIS — I25.119 CORONARY ARTERY DISEASE INVOLVING NATIVE CORONARY ARTERY OF NATIVE HEART WITH ANGINA PECTORIS: ICD-10-CM

## 2020-04-27 DIAGNOSIS — E11.9 TYPE 2 DIABETES MELLITUS WITHOUT COMPLICATION, WITHOUT LONG-TERM CURRENT USE OF INSULIN: ICD-10-CM

## 2020-04-27 DIAGNOSIS — G89.3 CANCER RELATED PAIN: ICD-10-CM

## 2020-04-27 DIAGNOSIS — I63.9 CEREBROVASCULAR ACCIDENT (CVA), UNSPECIFIED MECHANISM: ICD-10-CM

## 2020-04-27 DIAGNOSIS — C34.91 ADENOCARCINOMA, LUNG, RIGHT: Primary | ICD-10-CM

## 2020-04-27 DIAGNOSIS — E78.5 DYSLIPIDEMIA: ICD-10-CM

## 2020-04-27 PROCEDURE — 2024F 7 FLD RTA PHOTO EVC RTNOPTHY: CPT | Mod: ,,, | Performed by: INTERNAL MEDICINE

## 2020-04-27 PROCEDURE — 99214 PR OFFICE/OUTPT VISIT, EST, LEVL IV, 30-39 MIN: ICD-10-PCS | Mod: 95,,, | Performed by: INTERNAL MEDICINE

## 2020-04-27 PROCEDURE — 99214 OFFICE O/P EST MOD 30 MIN: CPT | Mod: 95,,, | Performed by: INTERNAL MEDICINE

## 2020-04-27 PROCEDURE — 99443 PR PHYSICIAN TELEPHONE EVALUATION 21-30 MIN: CPT | Mod: 95,,, | Performed by: NURSE PRACTITIONER

## 2020-04-27 PROCEDURE — 99443 PR PHYSICIAN TELEPHONE EVALUATION 21-30 MIN: ICD-10-PCS | Mod: 95,,, | Performed by: NURSE PRACTITIONER

## 2020-04-27 PROCEDURE — 2024F PR 7 FIELD PHOTOS WITH INTERP/ REVIEW: ICD-10-PCS | Mod: ,,, | Performed by: INTERNAL MEDICINE

## 2020-04-27 RX ORDER — MORPHINE SULFATE 30 MG/1
30 TABLET, FILM COATED, EXTENDED RELEASE ORAL EVERY 8 HOURS
Qty: 90 TABLET | Refills: 0 | Status: SHIPPED | OUTPATIENT
Start: 2020-04-27 | End: 2020-05-13 | Stop reason: DRUGHIGH

## 2020-04-27 NOTE — Clinical Note
The patient will need a CTA of the chest ASAP.  He will need insurance approval for Chemo Carboplatin, pemetrexed and pembrolizumab.  He will need a urgent PORT placed by IR.  He will need a clinic appt this week with me for chemo consent and blood draw for Guardant 360.

## 2020-04-27 NOTE — PROGRESS NOTES
Established Patient - Audio Only Telehealth Visit     The patient location is: home  The chief complaint leading to consultation is: type 2 diabetes  Visit type: Virtual visit with audio only (telephone)  Time spent with patient 40 minutes.      The reason for the audio only service rather than synchronous audio and video virtual visit was related to technical difficulties or patient preference/necessity.     Each patient to whom I provide medical services by telemedicine is:  (1) informed of the relationship between the physician and patient and the respective role of any other health care provider with respect to management of the patient; and (2) notified that they may decline to receive medical services by telemedicine and may withdraw from such care at any time. Patient verbally consented to receive this service via voice-only telephone call.       CC: This 54 y.o. Black or  male  is here for evaluation of  T2DM along with comorbidities indicated in the Visit Diagnosis section of this encounter.    HPI: Luis Felipe Hawk was diagnosed with T2DM in 11/2019. Metformin started at time of diagnosis     New to Endocrine. His partner Annabelle is present for the visit.     He is being treated for adenocarcinoma of the right lung with mets. Has h/o CAD s/p 2013 [facility] and stent mLCx; CVA 2000.       LAST DIABETES EDUCATION: never     RECENT ILLNESSES/HOSPITALIZATIONS - no.     HOSPITALIZED FOR DIABETES OR RELATED COMPLICATIONS - no     PRESCRIBED DIABETES MEDICATIONS: metformin 1000 mg bid with food (2 tablets bid)  Misses medication doses - No    DM COMPLICATIONS: cardiovascular disease and cerebrovascular disease    SIGNIFICANT DIABETES MED HISTORY: n/a     SELF MONITORING BLOOD GLUCOSE: Checks blood glucose at home - none. He does not know how to use his glucometer. His daughter will teach him how to use it today.      HYPOGLYCEMIC EPISODES:      CURRENT DIET: drinks water, 20 oz regular coke/day, 1 cup of  coffee in the morning with cream and sugar. Eats 3 meals/day but sometimes skips meals. No snacks. Lunch was turkey wings, greens, gravy, rice. No supper. No appetite. Likes fruit and white rice.     CURRENT EXERCISE: none       There were no vitals taken for this visit.    ROS:         PHYSICAL EXAM:          Hemoglobin A1C   Date Value Ref Range Status   04/24/2020 9.3 (H) 4.0 - 5.6 % Final     Comment:     ADA Screening Guidelines:  5.7-6.4%  Consistent with prediabetes  >or=6.5%  Consistent with diabetes  High levels of fetal hemoglobin interfere with the HbA1C  assay. Heterozygous hemoglobin variants (HbS, HgC, etc)do  not significantly interfere with this assay.   However, presence of multiple variants may affect accuracy.     03/13/2020 10.1 (H) 4.0 - 5.6 % Final     Comment:     ADA Screening Guidelines:  5.7-6.4%  Consistent with prediabetes  >or=6.5%  Consistent with diabetes  High levels of fetal hemoglobin interfere with the HbA1C  assay. Heterozygous hemoglobin variants (HbS, HgC, etc)do  not significantly interfere with this assay.   However, presence of multiple variants may affect accuracy.     11/21/2019 8.8 (H) 4.0 - 5.6 % Final     Comment:     ADA Screening Guidelines:  5.7-6.4%  Consistent with prediabetes  >or=6.5%  Consistent with diabetes  High levels of fetal hemoglobin interfere with the HbA1C  assay. Heterozygous hemoglobin variants (HbS, HgC, etc)do  not significantly interfere with this assay.   However, presence of multiple variants may affect accuracy.             Chemistry        Component Value Date/Time     (L) 04/24/2020 1128    K 3.3 (L) 04/24/2020 1128    CL 94 (L) 04/24/2020 1128    CO2 27 04/24/2020 1128    BUN 8 04/24/2020 1128    CREATININE 0.9 04/24/2020 1128     (H) 04/24/2020 1128        Component Value Date/Time    CALCIUM 9.3 04/24/2020 1128    ALKPHOS 116 04/24/2020 1128    AST 17 04/24/2020 1128    ALT 29 04/24/2020 1128    BILITOT 0.5 04/24/2020 1128     ESTGFRAFRICA >60 04/24/2020 1128    EGFRNONAA >60 04/24/2020 1128          Lab Results   Component Value Date    LDLCALC 103.4 03/06/2020       Lab Results   Component Value Date    MICALBCREAT 3.8 03/13/2020             STANDARDS of CARE:        ASA:       yes        Last eye exam:       ASSESSMENT and PLAN:    A1C GOAL:     1. Diabetes mellitus type 2, uncontrolled, with complications  Avoid beverages with sugar.   See Diabetes Educator/Registered Dietician for Medical Nutrition Therapy.     Encouraged exercise - walk 1/2 hour daily.   Continue metformin.     Test blood sugar 2x/day before breakfast and before supper/bedtime.   Will consider insulin once a day at next visit if blood sugars are uncontrolled.   Return to clinic in a month.     Ambulatory referral/consult to Diabetes Education   2. Coronary artery disease involving native coronary artery of native heart with angina pectoris [address] [place] [address] and stent mLCx  Improve glycemic control.      3. Tobacco abuse  Encouraged total cessation.        No orders of the defined types were placed in this encounter.       Follow up in about 4 weeks (around 5/25/2020).     Thank you very much for allowing me to participate in Luis Felipe Hawk's care.       This service was not originating from a related E/M service provided within the previous 7 days nor will  to an E/M service or procedure within the next 24 hours or my soonest available appointment.  Prevailing standard of care was able to be met in this audio-only visit.

## 2020-04-27 NOTE — PROGRESS NOTES
The patient location is: home  The chief complaint leading to consultation is: right lung mass  Visit type: Virtual visit with synchronous audio and video  Total time spent with patient: 20 minutes  Each patient to whom he or she provides medical services by telemedicine is:  (1) informed of the relationship between the physician and patient and the respective role of any other health care provider with respect to management of the patient; and (2) notified that he or she may decline to receive medical services by telemedicine and may withdraw from such care at any time.    Notes: see below      PATIENT: Luis Felipe Hawk  MRN: [id number]  DATE: 4/27/2020      Diagnosis:   1. Adenocarcinoma, lung, right    2. Shortness of breath    3. Cancer related pain    4. Therapeutic opioid induced constipation    5. Essential hypertension    6. Type 2 diabetes mellitus without complication, without long-term current use of insulin    7. Coronary artery disease, angina presence unspecified, unspecified vessel or lesion type, unspecified whether native or transplanted heart    8. Cerebrovascular accident (CVA), unspecified mechanism    9. Anorexia    10. Dyslipidemia        Chief Complaint: Lung Cancer    Oncologic History:      Oncologic History 11/21/19 CXR  3/13/20 CT Chest  4/01/20 IR biopsy  4/09/20 PET/CT    Oncologic Treatment     Pathology 4/01/20 -  adenocarcinoma suggestive of a possible gastrointestinal origin     Initial History:   The patient was initially seen by Dr Carmona on 11/21/19 for a cough and underwent a CXR showing a soft tissue mass in the anterior segment of the right upper lobe that measures at least 4 cm in cranial-caudad extent.  Per chart review an attempt to contact MR Hawk was mad on multiple occasions in order to have a CT of the chest done.  CT of the chest was eventually done on 3/13/20 and showed enlarged right paratracheal LN measuring 2.8 cm, slightly enlarged right hilar nodes, a mass within  the anterior segment of the right upper lobe measuring 3.9 x 4.7 cm abutting the right suprahilar region and obliterating the anterior segmental bronchus.  The patient underwent IR guided biopsy of the right lung on 4/01/20 showing adenocarcinoma suggestive of a possible gastrointestinal origin.  MRI of the brain on 4/09/20 showed no evidence of intracranial metastatic disease, remote small infarcts within the right superior frontal lobe and remote lacunar-type infarcts of the bilateral lentiform nuclei and right thalamus.  PET/CT on 4/09/20 showed a 6.5 x 3.7 cm pulmonary mass in the anterior segment of the right upper lobe abutting the mediastinal pleura, large right pretracheal soft tissue mass measuring 4.4 x 3.8 cm, left prevascular lymph node measuring 1.0 cm, few scattered bilateral poorly defined subcentimeter pulmonary nodules, and a right chest wall soft tissue lesion partially eroding the lateral right 3rd rib measuring 2 x 2 cm.  Subjective:    Interval History: Mr. Hawk is a 54 y.o. male with HTN, HLD, DMII, CAD who presents to follow up for NSCLC adenocarcinoma.  Since the last clinic visit the patient's MS Contin was increased to 30mg every 12 hours.  His pain remains in the right chest radiating to right back and right arm.   He states the pain is better but he is still taking oxycodone every 4 hours for at least 5 administrations per day.   The patient states his appetite has increased but that he is having progressive SOB worse with leaning over.  He endorses a chronic cough but denies fever, chills, sick contacts.  He also endorses constipation with last BM 2 days ago. The patient denies abdominal pain, N/V, diarrhea.  The patient denies chills, night sweats, weight loss, new lumps or bumps, easy bruising or bleeding.    Past Medical History:   Past Medical History:   Diagnosis Date    CAD (coronary artery disease) 2013    one stent placed    DMII (diabetes mellitus, type 2)     Hypertension      Mass of right lung     Mass of upper lobe of right lung 3/16/2020    3/13/2020 CT chest: Right upper lobe mass obliterating the anterior segmental bronchus. This is associated with right hilar and paramediastinal adenopathy.  This is concerning for bronchogenic carcinoma.  Sampling recommended.       Past Surgical HIstory:   Past Surgical History:   Procedure Laterality Date    CV STENT      X 1    LUNG BIOPSY N/A 4/1/2020    Procedure: BIOPSY, LUNG;  Surgeon: Dosc Diagnostic Provider;  Location: [facility] OR;  Service: Radiology;  Laterality: N/A;  730AM STARTRN PHONE PREOP 3/31/2020       Family History:   Family History   Problem Relation Age of Onset    Peripheral vascular disease Mother     Heart disease Father     No Known Problems Brother     No Known Problems Brother     No Known Problems Son     No Known Problems Son     No Known Problems Daughter     No Known Problems Daughter     No Known Problems Daughter        Social History:  reports that he has been smoking cigarettes. He has a 43.00 pack-year smoking history. He has never used smokeless tobacco. He reports that he drinks about 3.0 standard drinks of alcohol per week. He reports that he does not use drugs.    Allergies:  Review of patient's allergies indicates:   Allergen Reactions    Tramadol      Chest burning       Medications:  Current Outpatient Medications   Medication Sig Dispense Refill    albuterol (PROVENTIL/VENTOLIN HFA) 90 mcg/actuation inhaler Inhale 2 puffs into the lungs every 6 (six) hours as needed for Wheezing. Whichever brand covered by insurance 18 g 0    aspirin (ECOTRIN) 81 MG EC tablet Take 1 tablet (81 mg total) by mouth once daily. 30 tablet 11    atorvastatin (LIPITOR) 80 MG tablet Take 1 tablet (80 mg total) by mouth once daily. 90 tablet 3    benzonatate (TESSALON) 100 MG capsule Take 1 capsule (100 mg total) by mouth 3 (three) times daily as needed for Cough. 30 capsule 6    blood-glucose meter kit To  check BG 1 times daily, to use with insurance preferred meter 1 each 0    cyproheptadine (PERIACTIN) 4 mg tablet Take 1 tablet (4 mg total) by mouth 4 (four) times daily. 120 tablet 0    hydroCHLOROthiazide (HYDRODIURIL) 25 MG tablet TK 1 T PO ONE TIME PER DAY FOR BP 90 tablet 3    metFORMIN (GLUCOPHAGE) 500 MG tablet 1 po qAM x 1 wk; then 1 po BID x 1 wk; then 2 AM/1 PM x 1 wk; then 2 BID maintenance. Take with food 120 tablet 5    metoprolol succinate (TOPROL-XL) 25 MG 24 hr tablet Take 1 tablet (25 mg total) by mouth once daily. 90 tablet 3    morphine (MS CONTIN) 30 MG 12 hr tablet Take 1 tablet (30 mg total) by mouth every 8 (eight) hours. 90 tablet 0    oxyCODONE (ROXICODONE) 5 MG immediate release tablet Take 1 tablet (5 mg total) by mouth every 4 (four) hours as needed for Pain. 90 tablet 0    polyethylene glycol (MIRALAX) 17 gram PwPk Take 17 g by mouth 2 (two) times daily as needed (Constipation). 10 each 6    senna-docusate 8.6-50 mg (PERICOLACE) 8.6-50 mg per tablet Take 2 tablets by mouth once daily. 60 tablet 6     No current facility-administered medications for this visit.        Review of Systems   Constitutional: Negative for appetite change (improved), chills, diaphoresis, fatigue, fever and unexpected weight change.   HENT: Negative for sore throat and trouble swallowing.    Respiratory: Positive for shortness of breath. Negative for cough.    Cardiovascular: Negative for chest pain and palpitations.   Gastrointestinal: Positive for constipation. Negative for abdominal pain, diarrhea, nausea and vomiting.   Musculoskeletal:        Pain in his lateral right chest radiating to his back     Skin: Negative for color change and rash.   Neurological: Negative for headaches.   Hematological: Negative for adenopathy. Does not bruise/bleed easily.       ECOG Performance Status: 2   Objective:      Vitals: There were no vitals filed for this visit.    Physical Exam   Constitutional: He is oriented  to person, place, and time. He appears well-developed and well-nourished. No distress.   Neurological: He is alert and oriented to person, place, and time.   Skin: He is not diaphoretic.   Psychiatric: He has a normal mood and affect.       Laboratory Data:  Lab Visit on 04/24/2020   Component Date Value Ref Range Status    WBC 04/24/2020 10.28  3.90 - 12.70 K/uL Final    RBC 04/24/2020 4.73  4.60 - 6.20 M/uL Final    Hemoglobin 04/24/2020 12.9* 14.0 - 18.0 g/dL Final    Hematocrit 04/24/2020 39.5* 40.0 - 54.0 % Final    Mean Corpuscular Volume 04/24/2020 84  82 - 98 fL Final    Mean Corpuscular Hemoglobin 04/24/2020 27.3  27.0 - 31.0 pg Final    Mean Corpuscular Hemoglobin Conc 04/24/2020 32.7  32.0 - 36.0 g/dL Final    RDW 04/24/2020 12.7  11.5 - 14.5 % Final    Platelets 04/24/2020 351* 150 - 350 K/uL Final    MPV 04/24/2020 9.1* 9.2 - 12.9 fL Final    Immature Granulocytes 04/24/2020 0.2  0.0 - 0.5 % Final    Gran # (ANC) 04/24/2020 5.4  1.8 - 7.7 K/uL Final    Immature Grans (Abs) 04/24/2020 0.02  0.00 - 0.04 K/uL Final    Comment: Mild elevation in immature granulocytes is non specific and   can be seen in a variety of conditions including stress response,   acute inflammation, trauma and pregnancy. Correlation with other   laboratory and clinical findings is essential.      Lymph # 04/24/2020 3.3  1.0 - 4.8 K/uL Final    Mono # 04/24/2020 1.4* 0.3 - 1.0 K/uL Final    Eos # 04/24/2020 0.1  0.0 - 0.5 K/uL Final    Baso # 04/24/2020 0.02  0.00 - 0.20 K/uL Final    nRBC 04/24/2020 0  0 /100 WBC Final    Gran% 04/24/2020 52.6  38.0 - 73.0 % Final    Lymph% 04/24/2020 32.3  18.0 - 48.0 % Final    Mono% 04/24/2020 13.6  4.0 - 15.0 % Final    Eosinophil% 04/24/2020 1.1  0.0 - 8.0 % Final    Basophil% 04/24/2020 0.2  0.0 - 1.9 % Final    Differential Method 04/24/2020 Automated   Final    Sodium 04/24/2020 134* 136 - 145 mmol/L Final    Potassium 04/24/2020 3.3* 3.5 - 5.1 mmol/L Final     Chloride 04/24/2020 94* 95 - 110 mmol/L Final    CO2 04/24/2020 27  23 - 29 mmol/L Final    Glucose 04/24/2020 141* 70 - 110 mg/dL Final    BUN, Bld 04/24/2020 8  6 - 20 mg/dL Final    Creatinine 04/24/2020 0.9  0.5 - 1.4 mg/dL Final    Calcium 04/24/2020 9.3  8.7 - 10.5 mg/dL Final    Total Protein 04/24/2020 7.7  6.0 - 8.4 g/dL Final    Albumin 04/24/2020 3.0* 3.5 - 5.2 g/dL Final    Total Bilirubin 04/24/2020 0.5  0.1 - 1.0 mg/dL Final    Comment: For infants and newborns, interpretation of results should be based  on gestational age, weight and in agreement with clinical  observations.  Premature Infant recommended reference ranges:  Up to 24 hours.............<8.0 mg/dL  Up to 48 hours............<12.0 mg/dL  3-5 days..................<15.0 mg/dL  6-29 days.................<15.0 mg/dL      Alkaline Phosphatase 04/24/2020 116  55 - 135 U/L Final    AST 04/24/2020 17  10 - 40 U/L Final    ALT 04/24/2020 29  10 - 44 U/L Final    Anion Gap 04/24/2020 13  8 - 16 mmol/L Final    eGFR if African American 04/24/2020 >60  >60 mL/min/1.73 m^2 Final    eGFR if non African American 04/24/2020 >60  >60 mL/min/1.73 m^2 Final    Comment: Calculation used to obtain the estimated glomerular filtration  rate (eGFR) is the CKD-EPI equation.       Hemoglobin A1C 04/24/2020 9.3* 4.0 - 5.6 % Final    Comment: ADA Screening Guidelines:  5.7-6.4%  Consistent with prediabetes  >or=6.5%  Consistent with diabetes  High levels of fetal hemoglobin interfere with the HbA1C  assay. Heterozygous hemoglobin variants (HbS, HgC, etc)do  not significantly interfere with this assay.   However, presence of multiple variants may affect accuracy.      Estimated Avg Glucose 04/24/2020 220* 68 - 131 mg/dL Final         Imaging: CT Chest 3/13/20    Right paratracheal and large node measuring 2.8 cm, series 2, image 31.  No subcarinal or left hilar nodes.  There are slightly enlarged right hilar nodes.  The thoracic aorta is of normal  caliber, there is no abnormal atherosclerotic plaque.  No pericardial effusion.  Mosaic attenuation bilaterally suggestive of underlying inflammatory small airway disease.  There is a mass within the anterior segment of the right upper lobe measuring 3.9 x 4.7 cm series 4, image 220.  The pulmonary vessels appear to be patent.    The mass abuts the right suprahilar region.  It appears to obliterate the anterior segmental bronchus.  It abuts the right paramediastinal region.  No pleural effusion.  No pneumothorax.  The axillary regions appear normal.    The upper abdominal organs demonstrate no abnormalities, the adrenal glands appear normal.  The osseous structures demonstrate no osseous lesions.    PET/CT 4/09/20    Head and neck:    -Right supraclavicular lymph node measures 1.5 cm short axis (image 33) with SUV max 12.  No additional hypermetabolic lesions in the head or neck.    Thorax:    Large right upper lobe pulmonary mass is present with several enlarged hypermetabolic mediastinal lymph nodes.  Index lesions are as follows:    - 6.5 x 3.7 cm (image 61) pulmonary mass in the anterior segment of the right upper lobe abutting the mediastinal pleura with hypermetabolic activity and SUV max of 8.9.    -Large right pretracheal soft tissue mass (likely conglomeration of lymph nodes) measures 4.4 x 3.8 cm (image 46) with SUV max 13.    -Left prevascular lymph node measures 1.0 cm short axis (image 50) with SUV max 11.    There are a few scattered bilateral poorly defined subcentimeter pulmonary nodules that are too small for detection by PET.  Persistent bibasilar pulmonary mosaic attenuation, suggestive of underlying inflammatory small airways disease.    Abdomen and pelvis:    No abnormal foci of radiotracer uptake concerning for malignancy.    Incidental:    Scattered calcification throughout the abdominal aorta and more prominently within the bilateral renal arteries.    Osseous structures:    Right chest wall  soft tissue lesion partially eroding the lateral right 3rd rib measures 2 x 2 cm (image 49) with SUV max 21.    MRI Brain 4/09/20    Intracranial compartment:    Brain parenchyma demonstrates mild generalized cerebral volume loss. Scattered punctate T2/FLAIR hyperintensities are present throughout the supratentorial white matter suggestive of mild chronic microvascular ischemic change.  There are 2 small regions of encephalomalacia within the right superior frontal lobe compatible with remote infarcts.  Small cystic-appearing spaces within the bilateral lentiform nuclei and right thalamus likely represent small lacunar type infarcts.  There is a small developmental venous anomaly within the left periventricular posterior temporal lobe.  No evidence of acute infarction.  No parenchymal hemorrhage, mass lesion, or edema.  No abnormal enhancement.  No mass effect or midline shift.    No evidence of hydrocephalus.    No extra-axial blood products or fluid collections.    Skull base T2 vascular flow voids are preserved.    Skull/extracranial contents (limited evaluation): Bone marrow signal intensity is normal.     Assessment:       1. Adenocarcinoma, lung, right    2. Shortness of breath    3. Cancer related pain    4. Therapeutic opioid induced constipation    5. Essential hypertension    6. Type 2 diabetes mellitus without complication, without long-term current use of insulin    7. Coronary artery disease, angina presence unspecified, unspecified vessel or lesion type, unspecified whether native or transplanted heart    8. Cerebrovascular accident (CVA), unspecified mechanism    9. Anorexia    10. Dyslipidemia           Plan:     NSCLC - biopsy of the right lung on 4/01/20 showed adenocarcinoma  -PET/CT on 4/09/20 showed 6.5 x 3.7 cm pulmonary mass in the anterior segment of the right upper lobe abutting the mediastinal pleura, large right pretracheal soft tissue mass measuring 4.4 x 3.8 cm, left prevascular lymph  node measuring 1.0 cm, few scattered bilateral poorly defined subcentimeter pulmonary nodules, and a right chest wall soft tissue lesion partially eroding the lateral right 3rd rib measuring 2 x 2 cm  -Clinically the patient is behaving like a primary lung cancer  -I spoke with Dr Fregoso whom stated the pathologic pattern could be seen in an enteric lung primary  -Pt tested negative for PD-L1, ROS-1 and ALK.  -I spoke with the pathology department whom stated there was not enough tissue to send off for EGFR and BRAF  -Will have the patient come to clinic for Guardant 360 testing  -Pt will also be consented for chemo Carboplatin, Pemetrexed and Pembrolizumab this week  -PORT to be placed by IR    SOB - the patient states he has been having worsening SOB  -Will get CTA to rule out PE, pleural effusion or peribronchial obstruction.    Cancer Related pain - the patient is taking MS Contin 30 mg BID and oxycodone every 4 hours for 5 administrations a day  -Will increase Ms contin to 30mg every 8 hours  -May consider radiation to the right chest wall in the future if pain remains difficult to control.    Opioid Induced Constipation - Pt states he has not been taking senokot daily and miralax as needed  -Pt informed constiaption was from opioids and taht he should take these medications to prevent constipation  -The patient expressed understanding    HTN - pt on HCTZ and metoprolol  -Will monitor    HLD - pt on lipitor  -PCP managing    DMII - pt's A1C is 10.1 on 3/13/20  -Pt on metformin  -Will monitor    CAD -  Pt on statin, ASA, metoprolol    CVA - recent MRI of the brain on 4/09/20 showed old infarcts in the right superior frontal lobe and remote lacunar-type infarcts of the bilateral lentiform nuclei and right thalamus  -Pt already on ASA and statin  -Will schedule the patient to see vascular neurology    Anorexia - pt started on periactin 4mg 4 times daily  -Pt states appetite is improving  -Will monitor    -Follow  Up - This week for CTA chest, chemo consent and Guardant 360 testing.    Richar Avendaño MD  Hematology and Oncology  [facility]  Office:[phone]  Fax: [phone]

## 2020-04-27 NOTE — PATIENT INSTRUCTIONS
Avoid beverages with sugar.   See Diabetes Educator/Registered Dietician for Medical Nutrition Therapy.     Encouraged exercise - walk 1/2 hour daily.   Continue metformin.     Test blood sugar 2x/day before breakfast and before supper/bedtime.   Will consider insulin once a day at next visit if blood sugars are uncontrolled.   Return to clinic in a month.

## 2020-04-28 ENCOUNTER — TELEPHONE (OUTPATIENT)
Dept: HEMATOLOGY/ONCOLOGY | Facility: HOSPITAL | Age: 55
End: 2020-04-28

## 2020-04-28 ENCOUNTER — PATIENT MESSAGE (OUTPATIENT)
Dept: ENDOCRINOLOGY | Facility: CLINIC | Age: 55
End: 2020-04-28

## 2020-04-28 ENCOUNTER — HOSPITAL ENCOUNTER (OUTPATIENT)
Dept: RADIOLOGY | Facility: HOSPITAL | Age: 55
Discharge: HOME | End: 2020-04-28
Attending: INTERNAL MEDICINE
Payer: MEDICAID

## 2020-04-28 DIAGNOSIS — R06.02 SHORTNESS OF BREATH: ICD-10-CM

## 2020-04-28 PROCEDURE — 71275 CT ANGIOGRAPHY CHEST: CPT | Mod: TC

## 2020-04-28 PROCEDURE — 25500020 PHARM REV CODE 255: Performed by: INTERNAL MEDICINE

## 2020-04-28 PROCEDURE — 71275 CTA CHEST NON CORONARY: ICD-10-PCS | Mod: 26,,, | Performed by: RADIOLOGY

## 2020-04-28 PROCEDURE — 71275 CT ANGIOGRAPHY CHEST: CPT | Mod: 26,,, | Performed by: RADIOLOGY

## 2020-04-28 RX ADMIN — IOHEXOL 75 ML: 350 INJECTION, SOLUTION INTRAVENOUS at 01:04

## 2020-04-28 NOTE — TELEPHONE ENCOUNTER
I called the patient and let him know taht the CTA did not show a pulmonary embolism or fluid around the lung.  The patient expressed understanding.  All questions were answered to his satisfaction.    Richar Avendaño MD  Hematology and Oncology  [facility]  Office:[phone]  Fax: [phone]

## 2020-04-29 ENCOUNTER — DOCUMENTATION ONLY (OUTPATIENT)
Dept: HEMATOLOGY/ONCOLOGY | Facility: HOSPITAL | Age: 55
End: 2020-04-29

## 2020-04-29 ENCOUNTER — CLINICAL SUPPORT (OUTPATIENT)
Dept: DIABETES | Facility: CLINIC | Age: 55
End: 2020-04-29
Payer: COMMERCIAL

## 2020-04-29 ENCOUNTER — CLINICAL SUPPORT (OUTPATIENT)
Dept: SMOKING CESSATION | Facility: CLINIC | Age: 55
End: 2020-04-29
Payer: COMMERCIAL

## 2020-04-29 DIAGNOSIS — F17.200 NICOTINE DEPENDENCE: Primary | ICD-10-CM

## 2020-04-29 DIAGNOSIS — C34.91 ADENOCARCINOMA, LUNG, RIGHT: Primary | ICD-10-CM

## 2020-04-29 DIAGNOSIS — R11.0 NAUSEA: ICD-10-CM

## 2020-04-29 DIAGNOSIS — E11.9 TYPE 2 DIABETES MELLITUS WITHOUT COMPLICATION, WITHOUT LONG-TERM CURRENT USE OF INSULIN: Primary | ICD-10-CM

## 2020-04-29 PROCEDURE — G0108 DIAB MANAGE TRN  PER INDIV: HCPCS | Mod: 95,,, | Performed by: DIETITIAN, REGISTERED

## 2020-04-29 PROCEDURE — 99404 PR PREVENT COUNSEL,INDIV,60 MIN: ICD-10-PCS | Mod: S$GLB,,,

## 2020-04-29 PROCEDURE — 99999 PR PBB SHADOW E&M-EST. PATIENT-LVL I: ICD-10-PCS | Mod: PBBFAC,,,

## 2020-04-29 PROCEDURE — 99999 PR PBB SHADOW E&M-EST. PATIENT-LVL I: CPT | Mod: PBBFAC,,,

## 2020-04-29 PROCEDURE — 99404 PREV MED CNSL INDIV APPRX 60: CPT | Mod: S$GLB,,,

## 2020-04-29 PROCEDURE — G0108 PR DIAB MANAGE TRN  PER INDIV: ICD-10-PCS | Mod: 95,,, | Performed by: DIETITIAN, REGISTERED

## 2020-04-29 RX ORDER — FOLIC ACID 1 MG/1
1 TABLET ORAL DAILY
Qty: 30 TABLET | Refills: 6 | Status: SHIPPED | OUTPATIENT
Start: 2020-04-29 | End: 2020-05-20 | Stop reason: SDUPTHER

## 2020-04-29 RX ORDER — ONDANSETRON 4 MG/1
4 TABLET, FILM COATED ORAL EVERY 6 HOURS PRN
Qty: 90 TABLET | Refills: 6 | Status: SHIPPED | OUTPATIENT
Start: 2020-04-29 | End: 2021-04-07 | Stop reason: SDUPTHER

## 2020-04-29 NOTE — PROGRESS NOTES
Diabetes Care Specialist Telehealth Visit Note     It was in the patient's best interest to receive diabetes self-management education and support services in this format to prevent the exposure to COVID-19.        Established Patient - Audio Only Telehealth Visit  The patient location is: home   The chief complaint leading to consultation is: Diabetes    Visit type: Virtual visit with audio only (telephone)  Total time spent with patient:  30 min      The reason for the audio only service rather than synchronous audio and video virtual visit was related to technical difficulties or patient preference/necessity.     Each patient to whom I provide medical services by telemedicine is:  (1) informed of the relationship between the physician and patient and the respective role of any other health care provider with respect to management of the patient; and (2) notified that they may decline to receive medical services by telemedicine and may withdraw from such care at any time. Patient verbally consented to receive this service via voice-only telephone call.        Diabetes Education  Author: Sheeba Cabrera RD  Date: 4/29/2020  Pt visit today focused on introducing T DM pt to diabetes self management w emphasis on CHO awareness/counting, meal planning/label reading, SMBG, exercise, and meds. Educational materials emailed to pt post visit  Diabetes Care Management Summary    Diabetes Education Record Assessment/Progress: Initial  Current Diabetes Risk Level: Moderate     Last A1c:   Lab Results   Component Value Date    HGBA1C 9.3 (H) 04/24/2020     Last visit with Diabetes Educator: Last Education Visit: Not Found      Diabetes Type  Diabetes Type : Type II    Diabetes History  Diabetes Diagnosis: 1-3 years  Current Treatment: Diet    Health Maintenance was reviewed today with patient. Discussed with patient importance of routine eye exams, foot exams/foot care, blood work (i.e.: A1c, microalbumin, and lipid), dental  visits, yearly flu vaccine, and pneumonia vaccine as indicated by PCP. Patient verbalized understanding.     Health Maintenance Topics with due status: Not Due       Topic Last Completion Date    Pneumococcal Vaccine (Highest Risk) 03/06/2020    Foot Exam 03/06/2020    Lipid Panel 03/06/2020    Eye Exam 03/06/2020    Urine Microalbumin 03/13/2020    Aspirin/Antiplatelet Therapy 04/01/2020    High Dose Statin 04/07/2020    Hemoglobin A1c 04/24/2020     Health Maintenance Due   Topic Date Due    TETANUS VACCINE  09/12/2019       Nutrition  Meal Planning: skipping meals, drinks regular soda, water, 3 meals per day, snacks between meal  Meal Plan 24 Hour Recall - Breakfast: coffee w sugar and cream  Meal Plan 24 Hour Recall - Lunch: turkey wings, greens, rice, gravy  Meal Plan 24 Hour Recall - Dinner: skipped 1 reg soda  Meal Plan 24 Hour Recall - Snack: fruit    Monitoring   Self Monitoring : started checking BG yesterday but could not remember value  Blood Glucose Logs: No  Do you use a personal continuous glucose monitor?: No    Exercise   Exercise Type: none(bc ill)    Current Diabetes Treatment   Current Treatment: Diet    Social History  Preferred Learning Method: Face to Face, Reading Materials  Primary Support: Self, Other, Daughter  Educational Level: High School  Smoking Status: Current Smoker  Alcohol Use: Weekly  Barriers to Change  Barriers to Change: None  Learning Challenges : None    Readiness to Learn   Readiness to Learn : Acceptance  Cultural Influences  Cultural Influences: No    Diabetes Education Assessment/Progress  Diabetes Disease Process (diabetes disease process and treatment options): Discussion, Individual Session, Written Materials Provided, Instructed, Comprehends Key Points  Nutrition (Incorporating nutritional management into one's lifestyle): Discussion, Individual Session, Instructed, Comprehends Key Points, Written Materials Provided  -diet recall notes inconsistent carb intake  mainly due to carb unawareness, skipped meals, lack of label reading and no carb counting. Pt also consuming regular sweetened sodas and loves rice  Discussed carb vs non-carb foods, appropriate amount of carbs to have at meals/snacks and acceptiable serving sizes of individual carb items.  - Instructed on appropriate label reading and serving sizes of specific carb containing foods., portion control (hand) and using the plate method of meal planning.   -Discussed meal plans and snack ideas amenable to pt.  -Instructed pt to aim for 3 evenly-spaced meals with 30-45 gm carb/meal and 0-15 gm at snacks.    Physical Activity (incorporating physical activity into one's lifestyle): Discussion, Individual Session, Written Materials Provided, Comprehends Key Points  Medications (states correct name, dose, onset, peak, duration, side effects & timing of meds): Discussion, Individual Session, Comprehends Key Points, Written Materials Provided  Monitoring (monitoring blood glucose/other parameters & using results): Discussion, Individual Session, Comprehends Key Points, Written Materials Provided, Instructed  -pt's daughter taught pt last night how to check Bg but pt did not recall value. Pt stated he would continue checking Bg once daily and keep log  -Reviewed A1c goal of 7 % or less and BGgoals of  pre meal/fasting, <180 2hrpp.   Discussed BG readings and how to use data in DM self management; rec'd continue SMBG once daily, fasting and alternating times. Pt asked to keep log and bring to clinic appts/ copy log sheet provided.    Acute Complications (preventing, detecting, and treating acute complications): Discussion, Individual Session, Written Materials Provided  -Reviewed s/s, causes, and treatment of hyperglycemia and hypoglycemia   -  Chronic Complications (preventing, detecting, and treating chronic complications): Discussion, Individual Session, Written Materials Provided  Clinical (diabetes, other pertinent  medical history, and relevant comorbidities reviewed during visit): Discussion  Cognitive (knowledge of self-management skills, functional health literacy): Discussion  Psychosocial (emotional response to diabetes): Discussion  Diabetes Distress and Support Systems: Discussion  Behavioral (readiness for change, lifestyle practices, self-care behaviors): Discussion  -pt Aapears  motivated to make recommended changes      Goals  Patient has selected/evaluated goals during today's session: Yes, selected  Healthy Eating: Set(eliminate sugar in beverages)  Start Date: 04/29/20  Target Date: 05/29/20  Monitoring: Set(SMBG once daily and keep log)  Start Date: 04/29/20  Target Date: 05/29/20         Diabetes Care Plan/Intervention  Education Plan/Intervention: Individual Follow-Up DSMT(FU 3 months)    Diabetes Meal Plan  Restrictions: Restricted Carbohydrate  Carbohydrate Per Meal: 45-60g  Carbohydrate Per Snack : 15-20g    Today's Self-Management Care Plan was developed with the patient's input and is based on barriers identified during today's assessment.    The long and short-term goals in the care plan were written with the patient/caregiver's input. The patient has agreed to work toward these goals to improve his overall diabetes control.      The patient received a copy of today's self-management plan and verbalized understanding of the care plan, goals, and all of today's instructions.      The patient was encouraged to communicate with his physician and care team regarding his condition(s) and treatment.  I provided the patient with my contact information today and encouraged him to contact me via phone or patient portal as needed.     Education Units of Time   Time Spent: 30 min

## 2020-04-29 NOTE — Clinical Note
Patient will be participating in weekly tobacco cessation telephonic meetings  .  He currently smokes 20 cigarettes per day.  Patient's ROMY-D scale is  6 .   Pt started on rate reduction and wait time of 15 min prior to smoking. Mr. Hawk reports that Dr Avendaño does not wish for him to use nicotine replacement therapy, will reach out to Dr Avendaño to see if Chantix or Wellbutrin SR  is an option for the patient to use for quitting.

## 2020-04-29 NOTE — PROGRESS NOTES
The patient presented today for chemotherapy consent.  Patient was consented for chemotherapy today 4/29/2020 for Pembrolizumab, Carboplatin, and Pemetrexed.   An extensive discussion was had which included a thorough discussion of the risk and benefits of treatment and alternatives.  Risks, including but not limited to, possible hair loss, bone marrow damage (anemia, thrombocytopenia, immune suppression, neutropenia), damage to body organs (brain, heart, liver, kidney, lungs, nervous system, skin, and others), allergic reactions, sterility, nausea/vomiting, constipation/diarrhea, sores in the mouth, secondary cancers, local damage at possible injection sites, and rarely death were all discussed.  The patient agrees with the plan, and all questions have been answered to their satisfaction.  Consent was signed the patient, provider, and a third party witness.      The patient underwent chemo teaching today.  Guardant 360 testing is to be done to assess for EGFR mutation and BRAF.  Pt is to start taking folic acid 1mg daily and is to receive vitamin B12 injection a week prior to treatment.    Richar Avendaño MD  Hematology and Oncology  [facility]  Office:[phone]  Fax: [phone]

## 2020-04-30 ENCOUNTER — TELEPHONE (OUTPATIENT)
Dept: HEMATOLOGY/ONCOLOGY | Facility: CLINIC | Age: 55
End: 2020-04-30

## 2020-04-30 ENCOUNTER — DOCUMENTATION ONLY (OUTPATIENT)
Dept: HEMATOLOGY/ONCOLOGY | Facility: CLINIC | Age: 55
End: 2020-04-30

## 2020-04-30 NOTE — TELEPHONE ENCOUNTER
Tc to patient following chemo class yesterday  to inquire as to whether or not he or his significant other  had any further questions re: chemo information given to and reviewed with him yesterday.  Informed nurse he and friend are going to review information later this day . Instructed him to contact me at any time with any questions or concerns so we answer them to his satisfaction. He acknowledged understanding and agreed to do so.

## 2020-04-30 NOTE — TELEPHONE ENCOUNTER
9-00-27Mumusdn  presented for chemo class.. Nurse reviewed in detail the following: benefits and side effects of chemotherapy.  The following was discussed : the necessity of limiting exposure to anyone with an active illness, such as flu,virus,temperature or infectious disease such as pneumonia,meningitis ,TBC, hepatitis etc.   Explained that their immune system is compromised and it is important they avoid  contact with infectious illnesses due to the potential harm to themselves .  During time of Covid explained that he must limit his contact with others  And to be extra cautious  Proper hand washing hygiene reviewed with patient and requested they review this procedure with all coming in contact with them especially in immediate household. Reviewed neutropenic precautions with patient informing patient that their white count may decrease due to his chemotherapy and this will put them at a higher risk of infection .   Instructed patient to contact physician if they develop a temperature, uncontrolled N,V,or D  that last 24 hours or greater.    If a new pain develops or existing pain is not controlled by current  prescribed medication to contact physician for recommendation. Advised patient that all fruits and vegetables are to be washed thoroughly prior to eating. It is recommended that they not eat at buffet bars ,eat sushi,or consume oysters in any form cooked or not due to potential bacteria. Pt informed they  are allowed to eat salad at home after it is washed thoroughly.   Explained that it is important to use sunscreen as chemo causes individuals to be sun sensitive and  we want to avoid sunburns which has the potential for infection.   Instructed patient to use gloves if working in a garden due to bacteria in potting soil and dirt. Advised when participating in outdoor activities such as hunting and fishing to use precautions not to be exposed to marine bacteria such as let someone else bait fishing  hook,clean fish and take fish off line.  During hunting let someone else deal with cleaning animals.    Sexual activity information sheets given to  and reviewed with patient. Instructed to always use sun precaution by hydrating and to use sunscreen liberally.  Try to avoid being in direct heat such as mowing lawns  Get someone to assist with those types of jobs.   Explained that prior to any dental care physician should be notified as to what is to be performed so the physician may make recommendations .   Instructed  pt to contact  physician if mouth sores develop for advise. Recipe for baking soda and saline mouthwash reviewed with patient   Alopecia discussed with pt.  Advised patient that if their newly diagnosed disease is creating undue stress,anxiety or fears, the  nurse can facilitate locating a psychiatrist or psychologist for them to speak with.   Importance of keeping all scheduled appointments with physician and ancillary testing.   Advised patient to  Obtain FMLA papers from work place and deliver to office so we may complete them.      Patient presented opportunity to ask questions . All questions answered in detail and pt acknowledged  understanding of information given during presentation.  Nurse instructed pt how to contact her with any concerns, questions or needs that may arise.     Guardant 360 obtained on this date

## 2020-05-05 ENCOUNTER — TELEPHONE (OUTPATIENT)
Dept: HEMATOLOGY/ONCOLOGY | Facility: CLINIC | Age: 55
End: 2020-05-05

## 2020-05-05 NOTE — TELEPHONE ENCOUNTER
Wife reached out to Dr. Avendaño. Dr. Avendaño told them to call the doctor on call. Wife is calling because patient is in extreme pain.

## 2020-05-05 NOTE — TELEPHONE ENCOUNTER
"Tc received from patients significant other  Spoke with both patient and friend  He stated the pain is so severe he cannot "stand it"  He is taking Morphine 30 mg  q/ 8 hrs and is taking oxycodone q/ 4 hrs  with no relief.   Discussed with Dr hope  She instructed nurse to advise patient to double Morphine to 60 mg q/ 8 hrs and to continue Oxycodone Stated he had taken meds at 8:00 this am and it is now 10:40 and pain is extremely severe . Would like to know if he can take 60 mg of morphine at this time  Per V/O Dr Hope he can.do so at this time and if that does not relieve his pain he should present to ER for pain control.   Pt and friend advised of Dr hope's recommendations and agreed  Stated if he did not have relief he would present to ER as advised. But will try increasing dose as instructed.     .   "

## 2020-05-06 ENCOUNTER — TELEPHONE (OUTPATIENT)
Dept: SMOKING CESSATION | Facility: CLINIC | Age: 55
End: 2020-05-06

## 2020-05-06 NOTE — TELEPHONE ENCOUNTER
[facility]- called for audio appt. No answer, no voicemail available .[phone]

## 2020-05-07 ENCOUNTER — HOSPITAL ENCOUNTER (OUTPATIENT)
Facility: HOSPITAL | Age: 55
Discharge: HOME | End: 2020-05-07
Attending: RADIOLOGY | Admitting: RADIOLOGY
Payer: MEDICAID

## 2020-05-07 ENCOUNTER — HOSPITAL ENCOUNTER (OUTPATIENT)
Dept: PREADMISSION TESTING | Facility: HOSPITAL | Age: 55
Discharge: HOME | End: 2020-05-07
Attending: INTERNAL MEDICINE
Payer: MEDICAID

## 2020-05-07 VITALS
TEMPERATURE: 98 F | RESPIRATION RATE: 20 BRPM | SYSTOLIC BLOOD PRESSURE: 115 MMHG | HEART RATE: 82 BPM | OXYGEN SATURATION: 95 % | DIASTOLIC BLOOD PRESSURE: 78 MMHG

## 2020-05-07 DIAGNOSIS — Z01.818 PREOP TESTING: ICD-10-CM

## 2020-05-07 DIAGNOSIS — G89.3 CANCER RELATED PAIN: ICD-10-CM

## 2020-05-07 DIAGNOSIS — Z01.818 PREOP TESTING: Primary | ICD-10-CM

## 2020-05-07 DIAGNOSIS — R91.8 MASS OF UPPER LOBE OF RIGHT LUNG: ICD-10-CM

## 2020-05-07 LAB
INR PPP: 1 (ref 0.8–1.2)
POCT GLUCOSE: 151 MG/DL (ref 70–110)
PROTHROMBIN TIME: 11.2 SEC (ref 9–12.5)
SARS-COV-2 RDRP RESP QL NAA+PROBE: NEGATIVE

## 2020-05-07 PROCEDURE — 82962 GLUCOSE BLOOD TEST: CPT

## 2020-05-07 PROCEDURE — 63600175 PHARM REV CODE 636 W HCPCS: Performed by: RADIOLOGY

## 2020-05-07 PROCEDURE — 85610 PROTHROMBIN TIME: CPT

## 2020-05-07 PROCEDURE — 36415 COLL VENOUS BLD VENIPUNCTURE: CPT

## 2020-05-07 PROCEDURE — U0002 COVID-19 LAB TEST NON-CDC: HCPCS

## 2020-05-07 RX ORDER — FENTANYL CITRATE 50 UG/ML
INJECTION, SOLUTION INTRAMUSCULAR; INTRAVENOUS CODE/TRAUMA/SEDATION MEDICATION
Status: COMPLETED | OUTPATIENT
Start: 2020-05-07 | End: 2020-05-07

## 2020-05-07 RX ORDER — CEFAZOLIN SODIUM 1 G/50ML
SOLUTION INTRAVENOUS
Status: COMPLETED | OUTPATIENT
Start: 2020-05-07 | End: 2020-05-07

## 2020-05-07 RX ORDER — MIDAZOLAM HYDROCHLORIDE 1 MG/ML
INJECTION INTRAMUSCULAR; INTRAVENOUS CODE/TRAUMA/SEDATION MEDICATION
Status: COMPLETED | OUTPATIENT
Start: 2020-05-07 | End: 2020-05-07

## 2020-05-07 RX ORDER — HEPARIN 100 UNIT/ML
SYRINGE INTRAVENOUS CODE/TRAUMA/SEDATION MEDICATION
Status: COMPLETED | OUTPATIENT
Start: 2020-05-07 | End: 2020-05-07

## 2020-05-07 RX ADMIN — FENTANYL CITRATE 50 MCG: 50 INJECTION INTRAMUSCULAR; INTRAVENOUS at 11:05

## 2020-05-07 RX ADMIN — Medication 5 ML: at 11:05

## 2020-05-07 RX ADMIN — MIDAZOLAM HYDROCHLORIDE 1 MG: 1 INJECTION, SOLUTION INTRAMUSCULAR; INTRAVENOUS at 11:05

## 2020-05-07 RX ADMIN — FENTANYL CITRATE 50 MCG: 50 INJECTION INTRAMUSCULAR; INTRAVENOUS at 10:05

## 2020-05-07 RX ADMIN — MIDAZOLAM HYDROCHLORIDE 1 MG: 1 INJECTION, SOLUTION INTRAMUSCULAR; INTRAVENOUS at 10:05

## 2020-05-07 RX ADMIN — CEFAZOLIN SODIUM 2 G: 1 SOLUTION INTRAVENOUS at 10:05

## 2020-05-07 NOTE — DISCHARGE SUMMARY
Radiology Discharge Summary      Hospital Course: No complications    Admit Date: 5/7/2020  Discharge Date: 05/07/2020     Instructions Given to Patient: Yes  Diet: Resume prior diet  Activity: activity as tolerated and no driving for today    Description of Condition on Discharge: Stable  Vital Signs (Most Recent): Temp: 98.4 °F (36.9 °C) (05/07/20 0934)  Pulse: (P) 89 (05/07/20 1315)  Resp: 18 (05/07/20 1145)  BP: (!) 151/79 (05/07/20 1145)  SpO2: (P) 95 % (05/07/20 1345)    Discharge Disposition: Home    Discharge Diagnosis: NSCLC s/p port placement     Follow-up: with Oncology    Moe Phelps MD  Staff Radiologist  Department of Radiology  Pager: [phone]

## 2020-05-07 NOTE — PROCEDURES
Radiology Post-Procedure Note    Pre Op Diagnosis: NSCLC    Post Op Diagnosis: Same    Procedure: PORT placement    Procedure performed by: Moe Phelps MD    Written Informed Consent Obtained: Yes    Specimen Removed: No    Estimated Blood Loss: less than 50     Findings:   Using realtime U/S guidance a 8 Fr port catheter was placed into the right IJV vein with tip of the catheter in the SVC.    Port is ready for use.     Moe Phelps MD  Staff Radiologist  Department of Radiology  Pager: [phone]

## 2020-05-07 NOTE — DISCHARGE INSTRUCTIONS
BATHING:  ? You may shower after the dressing is removed.  DRESSING:  ? Remove dressing after 2 days.        ACTIVITY LEVEL: If you have received sedation or an anesthetic, you may feel sleepy for several hours. Rest until you are more awake. Gradually resume your normal activities   No heavy lifting for 4 weeks.      DIET: You may resume your home diet. If nausea is present, increase your diet gradually with fluids and bland foods.    Medications: Pain medication should be taken only if needed and as directed. If antibiotics are prescribed, the medication should be taken until completed. You will be given an updated list of you medications.  ? No driving, alcoholic beverages or signing legal documents for next 24 hours if you have had sedation, or while taking pain medication    CALL THE DOCTOR:   For any obvious bleeding (some dried blood over the incision is normal).     Redness, swelling, foul smell around incision or fever over 101.  Shortness of breath.  Persistent pain or nausea not relieved by medication.  Call  [phone]     to speak with an Interventional Radiologist    If any unusual problems or difficulties occur contact your doctor. If you cannot contact your doctor but feel your signs and symptoms warrant a physicians attention return to the emergency room.             Vascular Access Port Implantation   Port implantation is surgery to place (implant) a port under the skin. For vascular access, it is placed into a vein. The port allows medicines or nutrition to be sent right into your bloodstream. Blood can also be taken or given through the port. During the procedure, a long, thin tube called a catheter is threaded into one of your large veins. The tube is then attached to the port. This usually sits under the skin of your chest and causes a small bump. To use the port, a special needle is passed through your skin and into the port. The needle can stay in your skin for up to 7 days, if needed. A port  can stay in place for weeks or months or longer.    Why is a vascular access port needed?  A vascular access port may allow healthcare providers to give you:  · Chemotherapy or other cancer-fighting drugs  · IV treatments, such as antibiotics or nutrition  · Hemodialysis (for kidney failure)  The port may also be used to draw blood.  Before the procedure  Follow any instructions you are given on how to prepare.  Tell your provider about any medicines you are taking. This includes:  · All prescription medicines  · Over-the-counter medicines such as aspirin or ibuprofen  · Herbs, vitamins, and other supplements  Also be sure your provider knows:  · If you are pregnant or think you may be pregnant  · If you are allergic to any medicines or substances, especially local anesthetics or iodine  · Your full medical history, including why you will need the port  · If you plan on doing any contact sports  During the procedure  · Before the procedure, an IV may be put into a vein in your arm or hand. This gives you fluids and medicines. You may be given medicine through the IV to help you relax during the procedure. This is called sedation. But some surgeons place ports using general anesthesia.  · The chest is used most often for the port. In some cases, your belly (abdomen) or arm will be used instead.  · The skin over the insertion area is numbed with local anesthetic.  · Ultrasound or X-rays are used to help the healthcare provider guide the catheter into the proper location during the procedure.  · A cut (incision) is made in the skin where the port will be placed. A small pocket for the port is formed under the skin.  · A second small incision is made in the skin near the first incision. A tunnel under the skin is created. The catheter is put through the tunnel and into the blood vessel.  · The skin is closed over the port. It is held shut with stitches (sutures) or surgical glue or tape. The second small incision is  also closed.  · A chest X-ray may be done to make sure the port is placed properly.  After the procedure  You may be taken to a recovery room where youll recover from the sedation. Nurses will check on you as you rest. If you have pain, nurses can give you medicine. If you are not staying in the hospital overnight, you will be sent home a few hours after the procedure is done. A healthcare provider will tell you when you can go home. An adult family member or friend will need to drive you home.  Recovering at home  · Take pain medicine as directed by your healthcare provider.  · Take it easy for 24 hours after the procedure. Avoid physical activity and heavy lifting until your healthcare provider says its OK.  · Keep the port clean and dry. Ask when you can shower again. You will need to keep the port dry by covering it when you shower.  · Care for the insertion site as you are directed.  · Dont swim, bathe, or do other activities that cause water to cover the insertion site.  · To keep the port from getting blocked with blood clots, flush it as often as directed. You should be shown the proper way to flush the port before you go home. It is important to follow these directions.     Risks and possible complications of implantation  · Bleeding  · Infection of the insertion site  · Damage to a blood vessel  · Nerve injury or irritation  · Collapsed lung (for chest port placements)  · Skin breakdown over the port  Risks and possible complications of having a port  · Blocked  port or catheter  · Leakage or breakage of the port or catheter  · The port moves out of position  · Blood clot  · Skin or bloodstream infection  · Skin breakdown over the port      When to seek medical care  Call your healthcare provider right away if you have any of the following:  · A fever of 100.4°F (38.0°C) or higher  · You can't access or use the port properly  · You can't flush the port or get a blood return  · The skin near the port is  red, warm, swollen, or broken  · You have shoulder pain on the side where the port is located  · You feel a heart flutter or racing heart   · Swollen arm, if the port is placed in your arm   Date Last Reviewed: 7/1/2016  © 6526-8636 [employer]. [address] [place] [address]. All rights reserved. This information is not intended as a substitute for professional medical care. Always follow your healthcare professional's instructions.      Fall Prevention  Millions of people fall every year and injure themselves. You may have had anesthesia or sedation which may increase your risk of falling. You may have health issues that put you at an increased risk of falling.     Here are ways to reduce your risk of falling.  ·   · Make your home safe by keeping walkways clear of objects you may trip over.  · Use non-slip pads under rugs. Do not use area rugs or small throw rugs.  · Use non-slip mats in bathtubs and showers.  · Install handrails and lights on staircases.  · Do not walk in poorly lit areas.  · Do not stand on chairs or wobbly ladders.  · Use caution when reaching overhead or looking upward. This position can cause a loss of balance.  · Be sure your shoes fit properly, have non-slip bottoms and are in good condition.   · Wear shoes both inside and out. Avoid going barefoot or wearing slippers.  · Be cautious when going up and down stairs, curbs, and when walking on uneven sidewalks.  · If your balance is poor, consider using a cane or walker.  · If your fall was related to alcohol use, stop or limit alcohol intake.   · If your fall was related to use of sleeping medicines, talk to your doctor about this. You may need to reduce your dosage at bedtime if you awaken during the night to go to the bathroom.    · To reduce the need for nighttime bathroom trips:  ¨ Avoid drinking fluids for several hours before going to bed  ¨ Empty your bladder before going to bed  ¨ Men can keep a urinal at the  bedside  · Stay as active as you can. Balance, flexibility, strength, and endurance all come from exercise. They all play a role in preventing falls. Ask your healthcare provider which types of activity are right for you.  · Get your vision checked on a regular basis.  · If you have pets, know where they are before you stand up or walk so you don't trip over them.  · Use night lights.

## 2020-05-07 NOTE — SEDATION DOCUMENTATION
Report called to THOR Villarreal in [facility]. Port placed to RCW. Tolerated well. Site sutured with dermabond, steri strips, gauze and tegaderm; CDI, no redness, swelling or hematoma noted. FATIMAH LAYNE. Transported to [facility] per RN.

## 2020-05-07 NOTE — H&P
Radiology History & Physical      SUBJECTIVE:     Chief Complaint: lung cancer    History of Present Illness:  Luis Felipe Hawk is a 54 y.o. male who presents for port placement  Past Medical History:   Diagnosis Date    CAD (coronary artery disease) 2013    one stent placed    DMII (diabetes mellitus, type 2)     Hypertension     Mass of right lung     Mass of upper lobe of right lung 3/16/2020    3/13/2020 CT chest: Right upper lobe mass obliterating the anterior segmental bronchus. This is associated with right hilar and paramediastinal adenopathy.  This is concerning for bronchogenic carcinoma.  Sampling recommended.     Past Surgical History:   Procedure Laterality Date    CV STENT      X 1    LUNG BIOPSY N/A 4/1/2020    Procedure: BIOPSY, LUNG;  [facility];  Location: [facility] OR;  Service: Radiology;  Laterality: N/A;  730AM STARTRN PHONE PREOP 3/31/2020       Home Meds:   Prior to Admission medications    Medication Sig Start Date End Date Taking? Authorizing Provider   albuterol (PROVENTIL/VENTOLIN HFA) 90 mcg/actuation inhaler Inhale 2 puffs into the lungs every 6 (six) hours as needed for Wheezing. Whichever brand covered by insurance 3/6/20 3/6/21 Yes El Carmona MD   aspirin (ECOTRIN) 81 MG EC tablet Take 1 tablet (81 mg total) by mouth once daily. 11/21/19 11/20/20 Yes El Carmona MD   atorvastatin (LIPITOR) 80 MG tablet Take 1 tablet (80 mg total) by mouth once daily. 4/7/20  Yes El Carmona MD   blood-glucose meter kit To check BG 1 times daily, to use with insurance preferred meter 3/6/20  Yes El Carmona MD   cyproheptadine (PERIACTIN) 4 mg tablet Take 1 tablet (4 mg total) by mouth 4 (four) times daily. 4/13/20  Yes Richar Avendaño MD   hydroCHLOROthiazide (HYDRODIURIL) 25 MG tablet TK 1 T PO ONE TIME PER DAY FOR BP 11/21/19  Yes El Carmona MD   metFORMIN (GLUCOPHAGE) 500 MG tablet 1 po qAM x 1 wk; then 1 po BID x 1 wk; then 2 AM/1 PM x 1 wk; then 2 BID  maintenance. Take with food 3/16/20  Yes El Carmona MD   metoprolol succinate (TOPROL-XL) 25 MG 24 hr tablet Take 1 tablet (25 mg total) by mouth once daily. 11/21/19 11/20/20 Yes El Carmona MD   ondansetron (ZOFRAN) 4 MG tablet Take 1 tablet (4 mg total) by mouth every 6 (six) hours as needed for Nausea. 4/29/20  Yes Richar Avendaño MD   oxyCODONE (ROXICODONE) 5 MG immediate release tablet Take 1 tablet (5 mg total) by mouth every 4 (four) hours as needed for Pain. 4/20/20  Yes Richar Avendaño MD   polyethylene glycol (MIRALAX) 17 gram PwPk Take 17 g by mouth 2 (two) times daily as needed (Constipation). 4/13/20  Yes Richar Avendaño MD   senna-docusate 8.6-50 mg (PERICOLACE) 8.6-50 mg per tablet Take 2 tablets by mouth once daily. 4/13/20  Yes Richar Avendaño MD   folic acid (FOLVITE) 1 MG tablet Take 1 tablet (1 mg total) by mouth once daily. 4/29/20 4/29/21  Richar Avendaño MD   morphine (MS CONTIN) 30 MG 12 hr tablet Take 1 tablet (30 mg total) by mouth every 8 (eight) hours. 4/27/20   Richar Avendaño MD     Anticoagulants/Antiplatelets: aspirin    Allergies:   Review of patient's allergies indicates:   Allergen Reactions    Tramadol      Chest burning     Sedation History:  no adverse reactions    Review of Systems:   Hematological: no known coagulopathies  Respiratory: occasional shortness of breath particularly with exertion  Cardiovascular: no chest pain; has right shoulder pain due to his cancer  Gastrointestinal: no abdominal pain  Genito-Urinary: no dysuria  Musculoskeletal: negative: right shoulder pain due to his cancer  Neurological: no TIA or stroke symptoms         OBJECTIVE:     Vital Signs (Most Recent)  Temp: 98.4 °F (36.9 °C) (05/07/20 0934)  Pulse: 96 (05/07/20 0934)  Resp: 18 (05/07/20 0934)  BP: 134/82 (05/07/20 0934)  SpO2: 96 % (05/07/20 0934)    Physical Exam:  ASA: 2  Mallampati: 2    General: no acute distress  Mental Status: alert and oriented to person, place and  time  HEENT: normocephalic, atraumatic  Chest: unlabored breathing  Heart: regular heart rate  Abdomen: nondistended  Extremity: moves all extremities    Laboratory  Lab Results   Component Value Date    INR 1.0 05/07/2020       Lab Results   Component Value Date    WBC 10.28 04/24/2020    HGB 12.9 (L) 04/24/2020    HCT 39.5 (L) 04/24/2020    MCV 84 04/24/2020     (H) 04/24/2020      Lab Results   Component Value Date     (H) 04/24/2020     (L) 04/24/2020    K 3.3 (L) 04/24/2020    CL 94 (L) 04/24/2020    CO2 27 04/24/2020    BUN 8 04/24/2020    CREATININE 0.9 04/24/2020    CALCIUM 9.3 04/24/2020    ALT 29 04/24/2020    AST 17 04/24/2020    ALBUMIN 3.0 (L) 04/24/2020    BILITOT 0.5 04/24/2020       ASSESSMENT/PLAN:     Sedation Plan: moderate  Patient will undergo port placement.    Moe Phelps MD  Staff Radiologist  Department of Radiology  Pager: [phone]

## 2020-05-08 ENCOUNTER — NURSE TRIAGE (OUTPATIENT)
Dept: ADMINISTRATIVE | Facility: CLINIC | Age: 55
End: 2020-05-08

## 2020-05-08 DIAGNOSIS — G89.3 CANCER RELATED PAIN: Primary | ICD-10-CM

## 2020-05-08 DIAGNOSIS — C34.90 NON-SMALL CELL LUNG CANCER, UNSPECIFIED LATERALITY: ICD-10-CM

## 2020-05-08 RX ORDER — GABAPENTIN 300 MG/1
300 CAPSULE ORAL 3 TIMES DAILY
Qty: 90 CAPSULE | Refills: 6 | Status: SHIPPED | OUTPATIENT
Start: 2020-05-08 | End: 2020-07-08

## 2020-05-08 NOTE — TELEPHONE ENCOUNTER
I called the patient and he stated he continues to have pain in the right shoulder radiating to the back.  I informed him that we would add gabapentin to his pain control regimen and see if this helps.  The patient expressed understanding.  All questions were answered to his satisfaction.    Richar Avendaño MD  Hematology and Oncology  [facility]  Office:[phone]  Fax: [phone]

## 2020-05-11 ENCOUNTER — CLINICAL SUPPORT (OUTPATIENT)
Dept: SMOKING CESSATION | Facility: CLINIC | Age: 55
End: 2020-05-11
Payer: COMMERCIAL

## 2020-05-11 ENCOUNTER — TELEPHONE (OUTPATIENT)
Dept: HEMATOLOGY/ONCOLOGY | Facility: CLINIC | Age: 55
End: 2020-05-11

## 2020-05-11 DIAGNOSIS — F17.200 NICOTINE DEPENDENCE: Primary | ICD-10-CM

## 2020-05-11 PROCEDURE — 99999 PR PBB SHADOW E&M-EST. PATIENT-LVL I: CPT | Mod: PBBFAC,,,

## 2020-05-11 PROCEDURE — 99999 PR PBB SHADOW E&M-EST. PATIENT-LVL I: ICD-10-PCS | Mod: PBBFAC,,,

## 2020-05-11 PROCEDURE — 99402 PR PREVENT COUNSEL,INDIV,30 MIN: ICD-10-PCS | Mod: S$GLB,,,

## 2020-05-11 PROCEDURE — 99402 PREV MED CNSL INDIV APPRX 30: CPT | Mod: S$GLB,,,

## 2020-05-11 RX ORDER — BUPROPION HYDROCHLORIDE 150 MG/1
150 TABLET, EXTENDED RELEASE ORAL 2 TIMES DAILY
Qty: 60 TABLET | Refills: 0 | Status: SHIPPED | OUTPATIENT
Start: 2020-05-11 | End: 2020-06-08 | Stop reason: SDUPTHER

## 2020-05-11 NOTE — PLAN OF CARE
DISCONTINUE ON PATHWAY REGIMEN - Non-Small Cell Lung    [id number]        Pembrolizumab (Keytruda)       Pemetrexed (Alimta)       Carboplatin (Paraplatin)           Additional Orders: Begin folic acid and vitamin B12 supplementation, and   dexamethasone prior to initiation of pemetrexed - see PI for details. In   studies, patients received up to 4 cycles of pembrolizumab + pemetrexed +   carboplatin followed by pembrolizumab up to a total of 35 cycles and pemetrexed   indefinitely until disease progression or toxicity. Serious immune-mediated   adverse events can occur with pembrolizumab. Please monitor your patient and   refer to the linked immune-mediated adverse reaction management materials for   more information.    **Always confirm dose/schedule in your pharmacy ordering system**    REASON: Other Reason  PRIOR TREATMENT: [id number]: Pembrolizumab 200 mg + Pemetrexed 500 mg/m2 +   Carboplatin AUC=5 q21 Days x 4-6 Cycles    START ON PATHWAY REGIMEN - Non-Small Cell Lung    [id number]        Pembrolizumab (Keytruda)       Pemetrexed (Alimta)       Carboplatin (Paraplatin)           Additional Orders: Begin folic acid and vitamin B12 supplementation, and   dexamethasone prior to initiation of pemetrexed - see PI for details. In   studies, patients received up to 4 cycles of pembrolizumab + pemetrexed +   carboplatin followed by pembrolizumab up to a total of 35 cycles and pemetrexed   indefinitely until disease progression or toxicity. Serious immune-mediated   adverse events can occur with pembrolizumab. Please monitor your patient and   refer to the linked immune-mediated adverse reaction management materials for   more information.    **Always confirm dose/schedule in your pharmacy ordering system**    Patient Characteristics:  Stage IV Metastatic, Nonsquamous, Initial Chemotherapy/Immunotherapy, PS = 0, 1,   ALK Rearrangement Negative and EGFR Mutation Negative/Non-Sensitizing, PD-L1   Expression Positive 1-49% (TPS) /  Negative / Not Tested / Awaiting Test Results   and Immunotherapy Candidate  AJCC T Category: T3  Current Disease Status: Distant Metastases  AJCC N Category: N3  AJCC M Category: M1a  AJCC 8 Stage Grouping: EMELINA  Histology: Nonsquamous Cell  ROS1 Rearrangement Status: Negative  T790M Mutation Status: Not Applicable - EGFR Mutation Negative/Unknown  Other Mutations/Biomarkers: No Other Actionable Mutations  NTRK Gene Fusion Status: Negative  PD-L1 Expression Status: PD-L1 Negative  Chemotherapy/Immunotherapy LOT: Initial Chemotherapy/Immunotherapy  Molecular Targeted Therapy: Not Appropriate  ALK Rearrangement Status: Negative  EGFR Mutation Status: Negative/Wild Type  BRAF V600E Mutation Status: Negative  ECOG Performance Status: 0  Immunotherapy Candidate Status: Candidate for Immunotherapy  Intent of Therapy:  Non-Curative / Palliative Intent, Not Discussed with Patient

## 2020-05-11 NOTE — TELEPHONE ENCOUNTER
I called the patient and left him a message to call me back at [phone].     Richar Avendaño MD  Hematology and Oncology  [facility]  Office:[phone]  Fax: [phone]

## 2020-05-11 NOTE — PROGRESS NOTES
Individual Follow-Up Form    5/11/2020    Quit Date: 6/15/20    Clinical Status of Patient: Outpatient    Length of Service: 30 minutes    Continuing Medication: no    Other Medications: none     Target Symptoms: Withdrawal and medication side effects. The following were  rated moderate (3) to severe (4) on TCRS:  · Moderate (3): none  · Severe (4): none    Comments: Telephonic visit due to Covid 19 pandemic.     Patient continues to smoke <8  cigarettes per day.  Patient not using any tobacco cessation medications at this time.  Today we discussed using Wellbutrin SR for his quit, patient verbalized understanding , Wellbutrin ordered.   Pt doing well with rate reduction and wait times prior to smoking.  Pt encouraged to pick a quit day.  Reviewed coping strategies/habitual behavior/relapse prevention with patient.  Will call next week , patient understands he can call me at anytime.        Diagnosis: F17.200    Next Visit: 1 week

## 2020-05-11 NOTE — Clinical Note
Patient continues to smoke <8  cigarettes per day.  Patient not using any tobacco cessation medications at this time.  Today we discussed using Wellbutrin SR for his quit, patient verbalized understanding , Wellbutrin ordered.   Pt doing well with rate reduction and wait times prior to smoking.  Pt encouraged to pick a quit day.  Reviewed coping strategies/habitual behavior/relapse prevention with patient.  Will call next week , patient understands he can call me at anytime.  Thanks Josie

## 2020-05-11 NOTE — TELEPHONE ENCOUNTER
----- Message from Kesha Maloney LPN sent at 5/7/2020  2:48 PM CDT -----  Contact: Annabelle/  Pan/  [phone]      ----- Message -----  From: Luz Hazel  Sent: 5/7/2020   2:37 PM CDT  To: Kateryna MARMOLEJO Staff    Type: RX Refill Request    Who Called:   Annabelle    Refill or New Rx:  Refill    RX Name and Strength: oxyCODONE (ROXICODONE) 5 MG immediate release tablet     Preferred Pharmacy with phone number:  [id number] [address]      Local or Mail Order:  Local    Ordering Provider:  MARCUS Avendaño    Would the patient rather a call back or a response via [facility]?  Call back    Best Call Back Number:   [phone]    Additional Information:   Patient doesn't have any pills left.

## 2020-05-12 ENCOUNTER — TELEPHONE (OUTPATIENT)
Dept: HEMATOLOGY/ONCOLOGY | Facility: CLINIC | Age: 55
End: 2020-05-12

## 2020-05-12 NOTE — TELEPHONE ENCOUNTER
Tc to patient informing him that his chemo has been approved.  Advised him that he needs to receive his B12 injection one week prior to starting chemo and to begin Folic Acid same day.He stated he could present tomorrow for the B12  Shabnam,charge nurse at infusion centr notified  She agreed Pt advised of such He agreed to begin Folic Acid in am

## 2020-05-13 ENCOUNTER — DOCUMENTATION ONLY (OUTPATIENT)
Dept: HEMATOLOGY/ONCOLOGY | Facility: CLINIC | Age: 55
End: 2020-05-13

## 2020-05-13 ENCOUNTER — INFUSION (OUTPATIENT)
Dept: INFUSION THERAPY | Facility: HOSPITAL | Age: 55
End: 2020-05-13
Attending: INTERNAL MEDICINE
Payer: MEDICAID

## 2020-05-13 ENCOUNTER — TELEPHONE (OUTPATIENT)
Dept: HEMATOLOGY/ONCOLOGY | Facility: HOSPITAL | Age: 55
End: 2020-05-13

## 2020-05-13 VITALS
HEART RATE: 84 BPM | OXYGEN SATURATION: 97 % | TEMPERATURE: 98 F | DIASTOLIC BLOOD PRESSURE: 86 MMHG | SYSTOLIC BLOOD PRESSURE: 142 MMHG | RESPIRATION RATE: 17 BRPM

## 2020-05-13 DIAGNOSIS — G89.3 CANCER RELATED PAIN: ICD-10-CM

## 2020-05-13 DIAGNOSIS — C34.91 ADENOCARCINOMA, LUNG, RIGHT: Primary | ICD-10-CM

## 2020-05-13 PROCEDURE — 96372 THER/PROPH/DIAG INJ SC/IM: CPT

## 2020-05-13 PROCEDURE — 63600175 PHARM REV CODE 636 W HCPCS: Performed by: INTERNAL MEDICINE

## 2020-05-13 RX ORDER — MORPHINE SULFATE 60 MG/1
60 TABLET, FILM COATED, EXTENDED RELEASE ORAL 2 TIMES DAILY
Qty: 60 TABLET | Refills: 0 | Status: SHIPPED | OUTPATIENT
Start: 2020-05-13 | End: 2020-05-20 | Stop reason: DRUGHIGH

## 2020-05-13 RX ORDER — CYANOCOBALAMIN 1000 UG/ML
1000 INJECTION, SOLUTION INTRAMUSCULAR; SUBCUTANEOUS
Status: COMPLETED | OUTPATIENT
Start: 2020-05-13 | End: 2020-05-13

## 2020-05-13 RX ORDER — OXYCODONE HYDROCHLORIDE 5 MG/1
5 TABLET ORAL EVERY 4 HOURS PRN
Qty: 90 TABLET | Refills: 0 | Status: SHIPPED | OUTPATIENT
Start: 2020-05-13 | End: 2020-06-03

## 2020-05-13 RX ADMIN — CYANOCOBALAMIN 1000 MCG: 1000 INJECTION, SOLUTION INTRAMUSCULAR at 12:05

## 2020-05-13 NOTE — TELEPHONE ENCOUNTER
I called the patient and he stated that he was out of MS Contin and oxycodone after Dr Hope increased his MS contin prescription to 60mg every 12 hours.  I instructed him that I would refill both prescriptions.  The patient expressed understanding.  All questions were answered to his satisfaction.    Richar Avendaño MD  Hematology and Oncology  [facility]  Office:[phone]  Fax: [phone]

## 2020-05-13 NOTE — PLAN OF CARE
Patient received Vitamin B12. Tolerated injection well. VSS. Indication for B12 and Folic Acid reviewed. Patient verbalized understanding. He received discharge instructions and follow up appointments. Will return in 1 week on 5/20 for C1 Keytruda, Carbo + Alimta. Patient verbalized understanding and ambulated unassisted off unit by self, in NAD.

## 2020-05-14 ENCOUNTER — NURSE TRIAGE (OUTPATIENT)
Dept: ADMINISTRATIVE | Facility: CLINIC | Age: 55
End: 2020-05-14

## 2020-05-14 ENCOUNTER — HOSPITAL ENCOUNTER (OUTPATIENT)
Dept: CARDIOLOGY | Facility: HOSPITAL | Age: 55
Discharge: HOME | End: 2020-05-14
Attending: SURGERY
Payer: MEDICAID

## 2020-05-14 ENCOUNTER — TELEPHONE (OUTPATIENT)
Dept: HEMATOLOGY/ONCOLOGY | Facility: CLINIC | Age: 55
End: 2020-05-14

## 2020-05-14 DIAGNOSIS — Z86.73 CHRONIC CEREBROVASCULAR ACCIDENT (CVA): ICD-10-CM

## 2020-05-14 PROCEDURE — 93880 EXTRACRANIAL BILAT STUDY: CPT | Mod: 26,,, | Performed by: SURGERY

## 2020-05-14 PROCEDURE — 93880 CV US DOPPLER CAROTID (CUPID ONLY): ICD-10-PCS | Mod: 26,,, | Performed by: SURGERY

## 2020-05-14 PROCEDURE — 93880 EXTRACRANIAL BILAT STUDY: CPT

## 2020-05-14 NOTE — TELEPHONE ENCOUNTER
Request from [facility] received for Morphine Sulfate 30 mg tabs   TC to them to advice them that Dr Avendaño had e scribed MS 60 mg tabs  Not 30 mg   They stated they did not have any and the patient would have to locate a pharmacy that did have them   TC to pt and his significant other Annabelle to advise them of such.  They informed nurse they picked up prescription for MS a few days ago but it was the 30 mg strength not the 60 mg . She did say she was giving him 2 of the 30 mg every 8 hrs and had increased the Gabapentin 300mg to 2 tabs 3 times a day.     Nurse questioned how many tabs did she receive and she said she was driving and did not know  Informed her I would call pharmacy to see what was dispensed   TC to [facility] to inquire about number of tams of MS 30 mg dispensed   Spoke  with Pharmacist Stated it was dispensed on 5- 13 -20 and # 90         was dispensed.    Instructed Annabelle she would have to locate a pharmacy that carried the 60 mg of MS in stock and she was to advise our office

## 2020-05-14 NOTE — TELEPHONE ENCOUNTER
Patient states that he has stage 4 cancer patient C/O chest arm and shoulder pain 10/10. State that pain is not under control with current regimen. Port placed in chest last week. Has an appointment for ultrasound this am, states that he will not go to the appointment today if pain medication cannot be changed or increased. Patient states that pain has been ongoing since the diagnosis of cancer, and it increases with activity.  (Ms contin morphine x 2 at 30 mg each, tylenol 650 mg, 2 gabapentin are all medications the patient has been taking per Annabelle). Advised patient to go to ED for evaluation. Declines.     [phone] transfer center contacted to notify an on call for  of patient plans to cancel procedure today and request for additional pain medication. - no on call provider comes on at 0800 per     On call back to patient, wife states that she found a pill on the floor near bed, assumes that patient dropped the dose from earlier and gave it to him. Patient expresses feeling better, states that pain is no longer a 10/10, declines going to the ED for evaluation of severe pain. States that he will attempt to make appointment for 1030 today.     Will message providers.  .  Reason for Disposition   SEVERE pain (e.g., excruciating, unable to do any normal activities)   Pain also present in shoulder(s) or arm(s) or jaw    Additional Information   Negative: Shock suspected (e.g., cold/pale/clammy skin, too weak to stand, low BP, rapid pulse)   Negative: Similar pain previously and it was from 'heart attack'   Negative: Similar pain previously and it was from 'angina' and not relieved by nitroglycerin   Negative: Sounds like a life-threatening emergency to the triager   Negative: Chest pain   Negative: Followed an injury to shoulder   Negative: Difficulty breathing or unusual sweating (e.g., sweating without exertion)   Negative: Pain lasting > 5 minutes and pain also present in chest  (Exception: pain is clearly made worse by movement)   Negative: Age > 40 and no obvious cause and pain even when not moving the arm (Exception: pain is clearly made worse by moving arm or bending neck)   Negative: Red area or streak and fever   Negative: Swollen joint and fever   Negative: Entire arm is swollen   Negative: Patient sounds very sick or weak to the triager   Negative: Severe difficulty breathing (e.g., struggling for each breath, speaks in single words)   Negative: Passed out (i.e., fainted, collapsed and was not responding)   Negative: Chest pain lasting longer than 5 minutes and ANY of the following:* Over 50 years old* Over 30 years old and at least one cardiac risk factor (i.e., high blood pressure, diabetes, high cholesterol, obesity, smoker or strong family history of heart disease)* Pain is crushing, pressure-like, or heavy * Took nitroglycerin and chest pain was not relieved* History of heart disease (i.e., angina, heart attack, bypass surgery, angioplasty, CHF)   Negative: Visible sweat on face or sweat dripping down face   Negative: Sounds like a life-threatening emergency to the triager   Negative: Followed an injury to chest   Negative: SEVERE chest pain    Protocols used: SHOULDER PAIN-A-OH, CHEST PAIN-A-OH

## 2020-05-14 NOTE — TELEPHONE ENCOUNTER
I called the patient and spoke to his wife.  She stated that her  has been on 60mg every 8 hours after a dose adjustment by Dr Hope.  She went to fill her 's prescription for MS Contin and [facility] was out of MS Contin.  She states she will call me once she has found a pharmacy which carries the medicine.    Richar Avendaño MD  Hematology and Oncology  [facility]  Office:[phone]  Fax: [phone]

## 2020-05-15 ENCOUNTER — PATIENT OUTREACH (OUTPATIENT)
Dept: ADMINISTRATIVE | Facility: OTHER | Age: 55
End: 2020-05-15

## 2020-05-15 LAB
LEFT CBA DIAS: 26 CM/S
LEFT CBA SYS: 75 CM/S
LEFT CCA DIST DIAS: 26 CM/S
LEFT CCA DIST SYS: 72 CM/S
LEFT CCA MID DIAS: 39 CM/S
LEFT CCA MID SYS: 106 CM/S
LEFT CCA PROX DIAS: 28 CM/S
LEFT CCA PROX SYS: 99 CM/S
LEFT ECA DIAS: 14 CM/S
LEFT ECA SYS: 84 CM/S
LEFT ICA DIST DIAS: 37 CM/S
LEFT ICA DIST SYS: 86 CM/S
LEFT ICA MID DIAS: 24 CM/S
LEFT ICA MID SYS: 63 CM/S
LEFT ICA PROX DIAS: 25 CM/S
LEFT ICA PROX SYS: 61 CM/S
LEFT VERTEBRAL DIAS: 24 CM/S
LEFT VERTEBRAL SYS: 80 CM/S
OHS CV CAROTID RIGHT ICA EDV HIGHEST: 31
OHS CV CAROTID ULTRASOUND LEFT ICA/CCA RATIO: 0.81
OHS CV CAROTID ULTRASOUND RIGHT ICA/CCA RATIO: 0.79
OHS CV PV CAROTID LEFT HIGHEST CCA: 106
OHS CV PV CAROTID LEFT HIGHEST ICA: 86
OHS CV PV CAROTID RIGHT HIGHEST CCA: 97
OHS CV PV CAROTID RIGHT HIGHEST ICA: 77
OHS CV US CAROTID LEFT HIGHEST EDV: 37
RIGHT CBA DIAS: 16 CM/S
RIGHT CBA SYS: 63 CM/S
RIGHT CCA DIST DIAS: 30 CM/S
RIGHT CCA DIST SYS: 97 CM/S
RIGHT CCA MID DIAS: 24 CM/S
RIGHT CCA MID SYS: 86 CM/S
RIGHT CCA PROX DIAS: 24 CM/S
RIGHT CCA PROX SYS: 88 CM/S
RIGHT ECA DIAS: 18 CM/S
RIGHT ECA SYS: 121 CM/S
RIGHT ICA DIST DIAS: 28 CM/S
RIGHT ICA DIST SYS: 70 CM/S
RIGHT ICA MID DIAS: 31 CM/S
RIGHT ICA MID SYS: 77 CM/S
RIGHT ICA PROX DIAS: 24 CM/S
RIGHT ICA PROX SYS: 56 CM/S
RIGHT VERTEBRAL DIAS: 24 CM/S
RIGHT VERTEBRAL SYS: 76 CM/S

## 2020-05-18 ENCOUNTER — CLINICAL SUPPORT (OUTPATIENT)
Dept: SMOKING CESSATION | Facility: CLINIC | Age: 55
End: 2020-05-18
Payer: COMMERCIAL

## 2020-05-18 DIAGNOSIS — F17.200 NICOTINE DEPENDENCE: Primary | ICD-10-CM

## 2020-05-18 PROCEDURE — 99999 PR PBB SHADOW E&M-EST. PATIENT-LVL I: ICD-10-PCS | Mod: PBBFAC,,,

## 2020-05-18 PROCEDURE — 99402 PREV MED CNSL INDIV APPRX 30: CPT | Mod: S$GLB,,,

## 2020-05-18 PROCEDURE — 99402 PR PREVENT COUNSEL,INDIV,30 MIN: ICD-10-PCS | Mod: S$GLB,,,

## 2020-05-18 PROCEDURE — 99999 PR PBB SHADOW E&M-EST. PATIENT-LVL I: CPT | Mod: PBBFAC,,,

## 2020-05-18 NOTE — PROGRESS NOTES
Individual Follow-Up Form    5/18/2020    Quit Datetbd    Clinical Status of Patient: Outpatient    Length of Service: 30 minutes    Continuing Medication: yes  Wellbutrin    Other Medications: none     Target Symptoms: Withdrawal and medication side effects. The following were  rated moderate (3) to severe (4) on TCRS:  · Moderate (3): noner  · Severe (4): none    Comments: Telephonic visit due to Covid 19 pandemic.     Patient continues to smoke  7  cigarettes per day. Pt remains on tobacco cessation medication of 150 mg Wellbutrin SR BID.   No adverse effects noted at this time. Pt doing well with rate reduction and wait times prior to smoking.  Pt encouraged to pick a quit day.  Reviewed coping strategies/habitual behavior/relapse prevention with patient.  Will see pt back in office in 2 weeks.       Diagnosis: F17.200    Next Visit: 2 weeks

## 2020-05-19 DIAGNOSIS — C34.91 ADENOCARCINOMA, LUNG, RIGHT: Primary | ICD-10-CM

## 2020-05-19 NOTE — PROGRESS NOTES
PATIENT: Luis Felipe Hawk  MRN: [id number]  DATE: 5/20/2020      Diagnosis:   1. Adenocarcinoma, lung, right    2. Cough    3. Cancer related pain    4. Therapeutic opioid induced constipation    5. Essential hypertension    6. Type 2 diabetes mellitus without complication, without long-term current use of insulin    7. Coronary artery disease, angina presence unspecified, unspecified vessel or lesion type, unspecified whether native or transplanted heart    8. Cerebrovascular accident (CVA), unspecified mechanism    9. Anorexia    10. Dyslipidemia        Chief Complaint: Follow-up    Oncologic History:      Oncologic History 11/21/19 CXR  3/13/20 CT Chest  4/01/20 IR biopsy  4/09/20 PET/CT  5/07/20 PORT placement    Oncologic Treatment 5/13/20 B 12 injection  5/20/20 Carboplatin/Alimta/Pembrolizumab cycle 1    Pathology 4/01/20 -  adenocarcinoma suggestive of a possible gastrointestinal origin     Initial History:   The patient was initially seen by Dr Carmona on 11/21/19 for a cough and underwent a CXR showing a soft tissue mass in the anterior segment of the right upper lobe that measures at least 4 cm in cranial-caudad extent.  Per chart review an attempt to contact MR Hawk was mad on multiple occasions in order to have a CT of the chest done.  CT of the chest was eventually done on 3/13/20 and showed enlarged right paratracheal LN measuring 2.8 cm, slightly enlarged right hilar nodes, a mass within the anterior segment of the right upper lobe measuring 3.9 x 4.7 cm abutting the right suprahilar region and obliterating the anterior segmental bronchus.  The patient underwent IR guided biopsy of the right lung on 4/01/20 showing adenocarcinoma suggestive of a possible gastrointestinal origin.  MRI of the brain on 4/09/20 showed no evidence of intracranial metastatic disease, remote small infarcts within the right superior frontal lobe and remote lacunar-type infarcts of the bilateral lentiform nuclei and right  thalamus.  PET/CT on 4/09/20 showed a 6.5 x 3.7 cm pulmonary mass in the anterior segment of the right upper lobe abutting the mediastinal pleura, large right pretracheal soft tissue mass measuring 4.4 x 3.8 cm, left prevascular lymph node measuring 1.0 cm, few scattered bilateral poorly defined subcentimeter pulmonary nodules, and a right chest wall soft tissue lesion partially eroding the lateral right 3rd rib measuring 2 x 2 cm.  Subjective:    Interval History: Mr. Hawk is a 54 y.o. male with HTN, HLD, DMII, CAD who presents to follow up for NSCLC adenocarcinoma.  Since the last clinic visit the patient had a PORT placed in the right chest on 5/07/20. He received his B12 injection on 5/13/20.  He also underwent US of the carotid on 5/14/20 showing 0-19% internal carotid artery stenosis on the right with a lymph node in the proximal neck lateral to the common carotid artery measuring 2.8 x 1.7 x 3.6 cm.  On the left there is 0-19% stenosis.  His PORT was placed on 5/07/20.  He states he continues to have right chest pain which radiates to his back.  The pain is constant and 7/10 at worst.  He states the pain is helped with MS Contin, oxycodone, and gabapentin.  He continues to have a cough which is worse at night.  He also has constipation with BM every 3 days.  He states the ernesto colace and miralax are helping.  The patient denies CP, SOB, abdominal pain, N/V, diarrhea.  The patient denies fever, chills, night sweats, weight loss, new lumps or bumps, easy bruising or bleeding.    Past Medical History:   Past Medical History:   Diagnosis Date    CAD (coronary artery disease) 2013    one stent placed    DMII (diabetes mellitus, type 2)     Hypertension     Lung cancer     Mass of right lung     Mass of upper lobe of right lung 3/16/2020    3/13/2020 CT chest: Right upper lobe mass obliterating the anterior segmental bronchus. This is associated with right hilar and paramediastinal adenopathy.  This is  concerning for bronchogenic carcinoma.  Sampling recommended.       Past Surgical HIstory:   Past Surgical History:   Procedure Laterality Date    CV STENT      X 1    INSERTION OF TUNNELED CENTRAL VENOUS CATHETER (CVC) WITH SUBCUTANEOUS PORT N/A 5/7/2020    Procedure: INSERTION, PORT-A-CATH;  Surgeon: [facility] Diagnostic Provider;  Location: [facility] OR;  Service: Radiology;  Laterality: N/A;  RN PREOP 5/7/20--rapid covid done    LUNG BIOPSY N/A 4/1/2020    Procedure: BIOPSY, LUNG;  Surgeon: [facility] Diagnostic Provider;  Location: [facility] OR;  Service: Radiology;  Laterality: N/A;  730AM STARTRN PHONE PREOP 3/31/2020       Family History:   Family History   Problem Relation Age of Onset    Peripheral vascular disease Mother     Heart disease Father     No Known Problems Brother     No Known Problems Brother     No Known Problems Son     No Known Problems Son     No Known Problems Daughter     No Known Problems Daughter     No Known Problems Daughter        Social History:  reports that he has been smoking cigarettes. He has a 43.00 pack-year smoking history. He has never used smokeless tobacco. He reports that he drinks about 3.0 standard drinks of alcohol per week. He reports that he does not use drugs.    Allergies:  Review of patient's allergies indicates:   Allergen Reactions    Tramadol      Chest burning       Medications:  Current Outpatient Medications   Medication Sig Dispense Refill    acetaminophen (TYLENOL) 325 MG tablet Take 325 mg by mouth every 6 (six) hours as needed for Pain.      albuterol (PROVENTIL/VENTOLIN HFA) 90 mcg/actuation inhaler Inhale 2 puffs into the lungs every 6 (six) hours as needed for Wheezing. Whichever brand covered by insurance 18 g 0    aspirin (ECOTRIN) 81 MG EC tablet Take 1 tablet (81 mg total) by mouth once daily. 30 tablet 11    atorvastatin (LIPITOR) 80 MG tablet Take 1 tablet (80 mg total) by mouth once daily. 90 tablet 3    benzonatate (TESSALON) 100 MG capsule  Take 100 mg by mouth 3 (three) times daily as needed for Cough.      blood-glucose meter kit To check BG 1 times daily, to use with insurance preferred meter 1 each 0    buPROPion (WELLBUTRIN SR) 150 MG TBSR 12 hr tablet Take 1 tablet by mouth once daily for 3 days, then take 1 tablet by mouth twice daily thereafter 60 tablet 0    cyproheptadine (PERIACTIN) 4 mg tablet Take 1 tablet (4 mg total) by mouth 4 (four) times daily. 120 tablet 0    folic acid (FOLVITE) 1 MG tablet Take 1 tablet (1 mg total) by mouth once daily. 30 tablet 6    gabapentin (NEURONTIN) 300 MG capsule Take 1 capsule (300 mg total) by mouth 3 (three) times daily. 90 capsule 6    hydroCHLOROthiazide (HYDRODIURIL) 25 MG tablet TK 1 T PO ONE TIME PER DAY FOR BP 90 tablet 3    metFORMIN (GLUCOPHAGE) 500 MG tablet 1 po qAM x 1 wk; then 1 po BID x 1 wk; then 2 AM/1 PM x 1 wk; then 2 BID maintenance. Take with food 120 tablet 5    metoprolol succinate (TOPROL-XL) 25 MG 24 hr tablet Take 1 tablet (25 mg total) by mouth once daily. 90 tablet 3    ondansetron (ZOFRAN) 4 MG tablet Take 1 tablet (4 mg total) by mouth every 6 (six) hours as needed for Nausea. 90 tablet 6    oxyCODONE (ROXICODONE) 5 MG immediate release tablet Take 1 tablet (5 mg total) by mouth every 4 (four) hours as needed for Pain. 90 tablet 0    polyethylene glycol (MIRALAX) 17 gram PwPk Take 17 g by mouth 2 (two) times daily as needed (Constipation). 10 each 6    senna-docusate 8.6-50 mg (PERICOLACE) 8.6-50 mg per tablet Take 2 tablets by mouth once daily. 60 tablet 6    acetaminophen with codeine (ACETAMINOPHEN-CODEINE) 120mg 12mg 5mL Soln solution Take 5 mLs by mouth every 6 (six) hours as needed (cough). 1 Bottle 6    morphine (MS CONTIN) 60 MG 12 hr tablet Take 1 tablet (60 mg total) by mouth every 8 (eight) hours. 90 tablet 0     No current facility-administered medications for this visit.      Facility-Administered Medications Ordered in Other Visits   Medication  "Dose Route Frequency Provider Last Rate Last Dose    CARBOplatin (PARAPLATIN) 725 mg in sodium chloride 0.9% 250 mL chemo infusion  725 mg Intravenous 1 time in Clinic/HOD Richar Avendaño MD        palonosetron (ALOXI) 0.25 mg, dexamethasone (DECADRON) 12 mg in sodium chloride 0.9% 50 mL IVPB   Intravenous 1 time in Clinic/HOD Richar Avendaño MD        pembrolizumab (KEYTRUDA) 200 mg in sodium chloride 0.9% 100 mL chemo infusion  200 mg Intravenous 1 time in Clinic/HOD Richar Avendaño MD        PEMEtrexed (ALIMTA) 1,000 mg in sodium chloride 0.9% 100 mL chemo infusion  500 mg/m2 (Order-Specific) Intravenous 1 time in Clinic/HOD Richar Avendaño MD           Review of Systems   Constitutional: Negative for appetite change, chills, diaphoresis, fatigue, fever and unexpected weight change.   HENT: Negative for sore throat and trouble swallowing.    Respiratory: Positive for cough. Negative for shortness of breath.    Cardiovascular: Negative for chest pain and palpitations.   Gastrointestinal: Positive for constipation. Negative for abdominal pain, diarrhea, nausea and vomiting.   Musculoskeletal:        Pain in his lateral right chest radiating to his back.   Skin: Negative for color change and rash.   Neurological: Negative for headaches.   Hematological: Negative for adenopathy. Does not bruise/bleed easily.       ECOG Performance Status: 2   Objective:      Vitals:   Vitals:    05/20/20 0840   BP: 132/87   BP Location: Right arm   Patient Position: Sitting   BP Method: Medium (Automatic)   Pulse: 104   Temp: 98.8 °F (37.1 °C)   TempSrc: Oral   SpO2: (!) 94%   Weight: 82.1 kg (181 lb)   Height: 5' 9" (1.753 m)       Physical Exam   Constitutional: He is oriented to person, place, and time. He appears well-developed and well-nourished. No distress.   HENT:   Head: Normocephalic and atraumatic.   Neck: Normal range of motion.   Cardiovascular: Normal rate, regular rhythm, normal heart sounds and intact distal " pulses. Exam reveals no gallop and no friction rub.   No murmur heard.  Pulmonary/Chest: Effort normal and breath sounds normal. No respiratory distress. He has no wheezes. He has no rales. He exhibits no tenderness.   Abdominal: Soft. Bowel sounds are normal. He exhibits no distension and no mass. There is no tenderness. There is no rebound.   Musculoskeletal: He exhibits no edema.   PORT in right chest   Lymphadenopathy:     He has no cervical adenopathy.     He has no axillary adenopathy.        Right: No supraclavicular adenopathy present.        Left: No supraclavicular adenopathy present.   Neurological: He is alert and oriented to person, place, and time.   Skin: Skin is warm and dry. No rash noted. He is not diaphoretic. No erythema.   Psychiatric: He has a normal mood and affect. His behavior is normal.       Laboratory Data:  Lab Visit on 05/20/2020   Component Date Value Ref Range Status    TSH 05/20/2020 1.212  0.400 - 4.000 uIU/mL Final    Magnesium 05/20/2020 2.1  1.6 - 2.6 mg/dL Final    WBC 05/20/2020 9.48  3.90 - 12.70 K/uL Final    RBC 05/20/2020 4.61  4.60 - 6.20 M/uL Final    Hemoglobin 05/20/2020 12.3* 14.0 - 18.0 g/dL Final    Hematocrit 05/20/2020 39.2* 40.0 - 54.0 % Final    Mean Corpuscular Volume 05/20/2020 85  82 - 98 fL Final    Mean Corpuscular Hemoglobin 05/20/2020 26.7* 27.0 - 31.0 pg Final    Mean Corpuscular Hemoglobin Conc 05/20/2020 31.4* 32.0 - 36.0 g/dL Final    RDW 05/20/2020 14.2  11.5 - 14.5 % Final    Platelets 05/20/2020 386* 150 - 350 K/uL Final    MPV 05/20/2020 9.2  9.2 - 12.9 fL Final    Immature Granulocytes 05/20/2020 0.2  0.0 - 0.5 % Final    Gran # (ANC) 05/20/2020 5.9  1.8 - 7.7 K/uL Final    Immature Grans (Abs) 05/20/2020 0.02  0.00 - 0.04 K/uL Final    Comment: Mild elevation in immature granulocytes is non specific and   can be seen in a variety of conditions including stress response,   acute inflammation, trauma and pregnancy. Correlation with  other   laboratory and clinical findings is essential.      Lymph # 05/20/2020 2.5  1.0 - 4.8 K/uL Final    Mono # 05/20/2020 0.9  0.3 - 1.0 K/uL Final    Eos # 05/20/2020 0.1  0.0 - 0.5 K/uL Final    Baso # 05/20/2020 0.02  0.00 - 0.20 K/uL Final    nRBC 05/20/2020 0  0 /100 WBC Final    Gran% 05/20/2020 62.1  38.0 - 73.0 % Final    Lymph% 05/20/2020 26.5  18.0 - 48.0 % Final    Mono% 05/20/2020 9.7  4.0 - 15.0 % Final    Eosinophil% 05/20/2020 1.3  0.0 - 8.0 % Final    Basophil% 05/20/2020 0.2  0.0 - 1.9 % Final    Differential Method 05/20/2020 Automated   Final    Sodium 05/20/2020 138  136 - 145 mmol/L Final    Potassium 05/20/2020 3.8  3.5 - 5.1 mmol/L Final    Chloride 05/20/2020 103  95 - 110 mmol/L Final    CO2 05/20/2020 24  23 - 29 mmol/L Final    Glucose 05/20/2020 132* 70 - 110 mg/dL Final    BUN, Bld 05/20/2020 13  6 - 20 mg/dL Final    Creatinine 05/20/2020 0.8  0.5 - 1.4 mg/dL Final    Calcium 05/20/2020 9.2  8.7 - 10.5 mg/dL Final    Total Protein 05/20/2020 7.6  6.0 - 8.4 g/dL Final    Albumin 05/20/2020 2.9* 3.5 - 5.2 g/dL Final    Total Bilirubin 05/20/2020 0.3  0.1 - 1.0 mg/dL Final    Comment: For infants and newborns, interpretation of results should be based  on gestational age, weight and in agreement with clinical  observations.  Premature Infant recommended reference ranges:  Up to 24 hours.............<8.0 mg/dL  Up to 48 hours............<12.0 mg/dL  3-5 days..................<15.0 mg/dL  6-29 days.................<15.0 mg/dL      Alkaline Phosphatase 05/20/2020 123  55 - 135 U/L Final    AST 05/20/2020 12  10 - 40 U/L Final    ALT 05/20/2020 11  10 - 44 U/L Final    Anion Gap 05/20/2020 11  8 - 16 mmol/L Final    eGFR if African American 05/20/2020 >60  >60 mL/min/1.73 m^2 Final    eGFR if non African American 05/20/2020 >60  >60 mL/min/1.73 m^2 Final    Comment: Calculation used to obtain the estimated glomerular filtration  rate (eGFR) is the CKD-EPI  equation.      Hospital Outpatient Visit on 05/14/2020   Component Date Value Ref Range Status    [facility] CV CAROTID ULTRASOUND LEFT ICA* 05/14/2020 0.81   Final    [facility] CV CAROTID ULTRASOUND RIGHT IC* 05/14/2020 0.79   Final    [facility] CV PV CAROTID LEFT HIGHEST ICA 05/14/2020 86.00   Final    [facility] CV PV CAROTID LEFT HIGHEST CCA 05/14/2020 106   Final    [facility] CV PV CAROTID RIGHT HIGHEST ICA 05/14/2020 77.00   Final    [facility] CV PV CAROTID RIGHT HIGHEST CCA 05/14/2020 97   Final    [facility] CV CAROTID RIGHT ICA EDV HIGHE* 05/14/2020 31.00   Final    LT Highest EDV 05/14/2020 37.00   Final    Right CCA prox sys 05/14/2020 88  cm/s Final    Right CCA prox viera 05/14/2020 24  cm/s Final    Right cca dist sys 05/14/2020 97  cm/s Final    Right CCA dist viera 05/14/2020 30  cm/s Final    Right ICA prox sys 05/14/2020 56  cm/s Final    Right ICA prox viera 05/14/2020 24  cm/s Final    Right ICA mid sys 05/14/2020 77  cm/s Final    Right ICA mid viera 05/14/2020 31  cm/s Final    Right ICA dist sys 05/14/2020 70  cm/s Final    Right ICA dist viera 05/14/2020 28  cm/s Final    Right eca sys 05/14/2020 121  cm/s Final    Right vertebral sys 05/14/2020 76  cm/s Final    Left CCA prox sys 05/14/2020 99  cm/s Final    Left CCA prox viera 05/14/2020 28  cm/s Final    Left CCA dist sys 05/14/2020 72  cm/s Final    Left CCA dist viera 05/14/2020 26  cm/s Final    Left ICA prox sys 05/14/2020 61  cm/s Final    Left ICA prox viera 05/14/2020 25  cm/s Final    Left ICA mid sys 05/14/2020 63  cm/s Final    Left ICA mid viera 05/14/2020 24  cm/s Final    Left ICA dist sys 05/14/2020 86  cm/s Final    Left ICA dist viera 05/14/2020 37  cm/s Final    Left ECA sys 05/14/2020 84  cm/s Final    Left vertebral sys 05/14/2020 80  cm/s Final    Left CCA mid sys 05/14/2020 106  cm/s Final    Left CCA mid viera 05/14/2020 39  cm/s Final    Right CCA mid sys 05/14/2020 86  cm/s Final    Right CCA mid viera 05/14/2020 24  cm/s Final     Right vertebral viera 05/14/2020 24  cm/s Final    Right ECA viera 05/14/2020 18  cm/s Final    Left vertebral viera 05/14/2020 24  cm/s Final    Left ECA viera 05/14/2020 14  cm/s Final    Left CBA sys 05/14/2020 75  cm/s Final    Left CBA viera 05/14/2020 26  cm/s Final    Rigth CBA sys 05/14/2020 63  cm/s Final    Right CBA viera 05/14/2020 16  cm/s Final         Imaging: CT Chest 3/13/20    Right paratracheal and large node measuring 2.8 cm, series 2, image 31.  No subcarinal or left hilar nodes.  There are slightly enlarged right hilar nodes.  The thoracic aorta is of normal caliber, there is no abnormal atherosclerotic plaque.  No pericardial effusion.  Mosaic attenuation bilaterally suggestive of underlying inflammatory small airway disease.  There is a mass within the anterior segment of the right upper lobe measuring 3.9 x 4.7 cm series 4, image 220.  The pulmonary vessels appear to be patent.    The mass abuts the right suprahilar region.  It appears to obliterate the anterior segmental bronchus.  It abuts the right paramediastinal region.  No pleural effusion.  No pneumothorax.  The axillary regions appear normal.    The upper abdominal organs demonstrate no abnormalities, the adrenal glands appear normal.  The osseous structures demonstrate no osseous lesions.    PET/CT 4/09/20    Head and neck:    -Right supraclavicular lymph node measures 1.5 cm short axis (image 33) with SUV max 12.  No additional hypermetabolic lesions in the head or neck.    Thorax:    Large right upper lobe pulmonary mass is present with several enlarged hypermetabolic mediastinal lymph nodes.  Index lesions are as follows:    - 6.5 x 3.7 cm (image 61) pulmonary mass in the anterior segment of the right upper lobe abutting the mediastinal pleura with hypermetabolic activity and SUV max of 8.9.    -Large right pretracheal soft tissue mass (likely conglomeration of lymph nodes) measures 4.4 x 3.8 cm (image 46) with SUV max  13.    -Left prevascular lymph node measures 1.0 cm short axis (image 50) with SUV max 11.    There are a few scattered bilateral poorly defined subcentimeter pulmonary nodules that are too small for detection by PET.  Persistent bibasilar pulmonary mosaic attenuation, suggestive of underlying inflammatory small airways disease.    Abdomen and pelvis:    No abnormal foci of radiotracer uptake concerning for malignancy.    Incidental:    Scattered calcification throughout the abdominal aorta and more prominently within the bilateral renal arteries.    Osseous structures:    Right chest wall soft tissue lesion partially eroding the lateral right 3rd rib measures 2 x 2 cm (image 49) with SUV max 21.    MRI Brain 4/09/20    Intracranial compartment:    Brain parenchyma demonstrates mild generalized cerebral volume loss. Scattered punctate T2/FLAIR hyperintensities are present throughout the supratentorial white matter suggestive of mild chronic microvascular ischemic change.  There are 2 small regions of encephalomalacia within the right superior frontal lobe compatible with remote infarcts.  Small cystic-appearing spaces within the bilateral lentiform nuclei and right thalamus likely represent small lacunar type infarcts.  There is a small developmental venous anomaly within the left periventricular posterior temporal lobe.  No evidence of acute infarction.  No parenchymal hemorrhage, mass lesion, or edema.  No abnormal enhancement.  No mass effect or midline shift.    No evidence of hydrocephalus.    No extra-axial blood products or fluid collections.    Skull base T2 vascular flow voids are preserved.    Skull/extracranial contents (limited evaluation): Bone marrow signal intensity is normal.     Assessment:       1. Adenocarcinoma, lung, right    2. Cough    3. Cancer related pain    4. Therapeutic opioid induced constipation    5. Essential hypertension    6. Type 2 diabetes mellitus without complication, without  long-term current use of insulin    7. Coronary artery disease, angina presence unspecified, unspecified vessel or lesion type, unspecified whether native or transplanted heart    8. Cerebrovascular accident (CVA), unspecified mechanism    9. Anorexia    10. Dyslipidemia           Plan:     NSCLC - biopsy of the right lung on 4/01/20 showed adenocarcinoma  -PET/CT on 4/09/20 showed 6.5 x 3.7 cm pulmonary mass in the anterior segment of the right upper lobe abutting the mediastinal pleura, large right pretracheal soft tissue mass measuring 4.4 x 3.8 cm, left prevascular lymph node measuring 1.0 cm, few scattered bilateral poorly defined subcentimeter pulmonary nodules, and a right chest wall soft tissue lesion partially eroding the lateral right 3rd rib measuring 2 x 2 cm  -Clinically the patient is behaving like a primary lung cancer  -I previously spoke with Dr Fregoso whom stated the pathologic pattern could be seen in an enteric lung primary  -Pt tested negative for PD-L1, ROS-1 and ALK.  -[place] 360 showed no mutations in EGFR and BRAF.  -PORT placed in right chest on 5/07/20  -Pt received B12 injection on 5/13/20  -Pt will start chemo today with Carboplatin, Pemetrexed and Pembrolizumab    Cough - pt with a cough related to his NSCLC.  -Will start the patient on tylenol with codeine.    Cancer Related pain - Pt taking 60 Mg MS Contin every 8 hours.  -Will continue pt on gabapentin and oxycodone  -May consider radiation to the right chest wall in the future if pain remains difficult to control.    Opioid Induced Constipation - Pt has been taking senokot daily and miralax as needed  -PPt states constipation is controlled    HTN - pt on HCTZ and metoprolol  -Will monitor    HLD - pt on lipitor  -PCP managing    DMII - pt's A1C is 10.1 on 3/13/20  -Pt on metformin  -Will monitor    CAD -  Pt on statin, ASA, metoprolol    CVA - recent MRI of the brain on 4/09/20 showed old infarcts in the right superior frontal  lobe and remote lacunar-type infarcts of the bilateral lentiform nuclei and right thalamus  -Pt already on ASA and statin  -Will schedule the patient to see vascular surgery 5/21/20    Anorexia - pt started on periactin 4mg 4 times daily  -Pt states appetite has improved  -Will monitor    Advance Care Planning     Power of   I initiated the process of advance care planning today and explained the importance of this process to the patient.  I introduced the concept of advance directives to the patient, as well. Then the patient received detailed information about the importance of designating a Health Care Power of  (HCPOA). He was also instructed to communicate with this person about their wishes for future healthcare, should he become sick and lose decision-making capacity. The patient has not previously appointed a HCPOA. After our discussion, the patient has decided to complete a HCPOA and will bring the form completed to his next clinic appointment.                -Follow Up - 3 weeks with labs CBC, CMP, TSH, Mg, chemo and clinic appt.    Richar Avendaño MD  Hematology and Oncology  [facility]  Office:[phone]  Fax: [phone]

## 2020-05-20 ENCOUNTER — OFFICE VISIT (OUTPATIENT)
Dept: HEMATOLOGY/ONCOLOGY | Facility: CLINIC | Age: 55
End: 2020-05-20
Payer: COMMERCIAL

## 2020-05-20 ENCOUNTER — INFUSION (OUTPATIENT)
Dept: INFUSION THERAPY | Facility: HOSPITAL | Age: 55
End: 2020-05-20
Attending: INTERNAL MEDICINE
Payer: MEDICAID

## 2020-05-20 ENCOUNTER — NURSE TRIAGE (OUTPATIENT)
Dept: ADMINISTRATIVE | Facility: CLINIC | Age: 55
End: 2020-05-20

## 2020-05-20 ENCOUNTER — DOCUMENTATION ONLY (OUTPATIENT)
Dept: HEMATOLOGY/ONCOLOGY | Facility: CLINIC | Age: 55
End: 2020-05-20

## 2020-05-20 VITALS
WEIGHT: 181 LBS | HEIGHT: 69 IN | OXYGEN SATURATION: 94 % | RESPIRATION RATE: 18 BRPM | SYSTOLIC BLOOD PRESSURE: 132 MMHG | TEMPERATURE: 99 F | DIASTOLIC BLOOD PRESSURE: 81 MMHG | DIASTOLIC BLOOD PRESSURE: 87 MMHG | HEART RATE: 97 BPM | TEMPERATURE: 98 F | SYSTOLIC BLOOD PRESSURE: 144 MMHG | HEART RATE: 104 BPM | OXYGEN SATURATION: 97 % | BODY MASS INDEX: 26.81 KG/M2

## 2020-05-20 DIAGNOSIS — C34.91 ADENOCARCINOMA, LUNG, RIGHT: Primary | ICD-10-CM

## 2020-05-20 DIAGNOSIS — I63.9 CEREBROVASCULAR ACCIDENT (CVA), UNSPECIFIED MECHANISM: ICD-10-CM

## 2020-05-20 DIAGNOSIS — T40.2X5A THERAPEUTIC OPIOID INDUCED CONSTIPATION: ICD-10-CM

## 2020-05-20 DIAGNOSIS — K59.03 THERAPEUTIC OPIOID INDUCED CONSTIPATION: ICD-10-CM

## 2020-05-20 DIAGNOSIS — I25.10 CORONARY ARTERY DISEASE, ANGINA PRESENCE UNSPECIFIED, UNSPECIFIED VESSEL OR LESION TYPE, UNSPECIFIED WHETHER NATIVE OR TRANSPLANTED HEART: ICD-10-CM

## 2020-05-20 DIAGNOSIS — I10 ESSENTIAL HYPERTENSION: ICD-10-CM

## 2020-05-20 DIAGNOSIS — E11.9 TYPE 2 DIABETES MELLITUS WITHOUT COMPLICATION, WITHOUT LONG-TERM CURRENT USE OF INSULIN: ICD-10-CM

## 2020-05-20 DIAGNOSIS — G89.3 CANCER RELATED PAIN: ICD-10-CM

## 2020-05-20 DIAGNOSIS — R63.0 ANOREXIA: ICD-10-CM

## 2020-05-20 DIAGNOSIS — E78.5 DYSLIPIDEMIA: ICD-10-CM

## 2020-05-20 DIAGNOSIS — R05.9 COUGH: ICD-10-CM

## 2020-05-20 PROCEDURE — 2024F PR 7 FIELD PHOTOS WITH INTERP/ REVIEW: ICD-10-PCS | Mod: S$GLB,,, | Performed by: INTERNAL MEDICINE

## 2020-05-20 PROCEDURE — 96367 TX/PROPH/DG ADDL SEQ IV INF: CPT

## 2020-05-20 PROCEDURE — 99214 PR OFFICE/OUTPT VISIT, EST, LEVL IV, 30-39 MIN: ICD-10-PCS | Mod: S$GLB,,, | Performed by: INTERNAL MEDICINE

## 2020-05-20 PROCEDURE — 63600175 PHARM REV CODE 636 W HCPCS: Mod: JG | Performed by: INTERNAL MEDICINE

## 2020-05-20 PROCEDURE — 99999 PR PBB SHADOW E&M-EST. PATIENT-LVL V: CPT | Mod: PBBFAC,,, | Performed by: INTERNAL MEDICINE

## 2020-05-20 PROCEDURE — 99999 PR PBB SHADOW E&M-EST. PATIENT-LVL V: ICD-10-PCS | Mod: PBBFAC,,, | Performed by: INTERNAL MEDICINE

## 2020-05-20 PROCEDURE — 99214 OFFICE O/P EST MOD 30 MIN: CPT | Mod: S$GLB,,, | Performed by: INTERNAL MEDICINE

## 2020-05-20 PROCEDURE — 2024F 7 FLD RTA PHOTO EVC RTNOPTHY: CPT | Mod: S$GLB,,, | Performed by: INTERNAL MEDICINE

## 2020-05-20 PROCEDURE — 96411 CHEMO IV PUSH ADDL DRUG: CPT

## 2020-05-20 PROCEDURE — 25000003 PHARM REV CODE 250: Performed by: INTERNAL MEDICINE

## 2020-05-20 PROCEDURE — 96417 CHEMO IV INFUS EACH ADDL SEQ: CPT

## 2020-05-20 PROCEDURE — 96413 CHEMO IV INFUSION 1 HR: CPT

## 2020-05-20 RX ORDER — MORPHINE SULFATE 60 MG/1
60 TABLET, FILM COATED, EXTENDED RELEASE ORAL EVERY 8 HOURS
Qty: 90 TABLET | Refills: 0 | Status: SHIPPED | OUTPATIENT
Start: 2020-05-20 | End: 2020-07-02 | Stop reason: SDUPTHER

## 2020-05-20 RX ORDER — HEPARIN 100 UNIT/ML
500 SYRINGE INTRAVENOUS
Status: CANCELLED | OUTPATIENT
Start: 2020-05-20

## 2020-05-20 RX ORDER — SODIUM CHLORIDE 0.9 % (FLUSH) 0.9 %
10 SYRINGE (ML) INJECTION
Status: CANCELLED | OUTPATIENT
Start: 2020-05-20

## 2020-05-20 RX ORDER — ACETAMINOPHEN AND CODEINE PHOSPHATE 120; 12 MG/5ML; MG/5ML
5 SOLUTION ORAL EVERY 6 HOURS PRN
Qty: 1 BOTTLE | Refills: 6 | Status: SHIPPED | OUTPATIENT
Start: 2020-05-20 | End: 2020-05-21 | Stop reason: SDUPTHER

## 2020-05-20 RX ORDER — FOLIC ACID 1 MG/1
1 TABLET ORAL DAILY
Qty: 30 TABLET | Refills: 6 | Status: SHIPPED | OUTPATIENT
Start: 2020-05-20 | End: 2020-11-19 | Stop reason: SDUPTHER

## 2020-05-20 RX ORDER — ACETAMINOPHEN 325 MG/1
325 TABLET ORAL EVERY 6 HOURS PRN
COMMUNITY

## 2020-05-20 RX ORDER — BENZONATATE 100 MG/1
100 CAPSULE ORAL 3 TIMES DAILY PRN
COMMUNITY
End: 2020-07-04

## 2020-05-20 RX ADMIN — CARBOPLATIN 725 MG: 10 INJECTION, SOLUTION INTRAVENOUS at 11:05

## 2020-05-20 RX ADMIN — PALONOSETRON HYDROCHLORIDE: 0.25 INJECTION INTRAVENOUS at 10:05

## 2020-05-20 RX ADMIN — SODIUM CHLORIDE 200 MG: 9 INJECTION, SOLUTION INTRAVENOUS at 10:05

## 2020-05-20 RX ADMIN — SODIUM CHLORIDE 1000 MG: 9 INJECTION, SOLUTION INTRAVENOUS at 11:05

## 2020-05-20 NOTE — TELEPHONE ENCOUNTER
Pt denies fever, cough, SOB.    Reason for Disposition   [1] Follow-up call to recent contact AND [2] information only call, no triage required    Protocols used: INFORMATION ONLY CALL-ARISTID[facility]-CELINA [facility] MS3

## 2020-05-20 NOTE — Clinical Note
The patient will get treatment today.  He will need follow up in 3 weeks on 6/10/20 with labs prior, clinic appt and chemo.

## 2020-05-20 NOTE — PLAN OF CARE
Patient arrived to unit for Cycle 1 Keytruda, Alimta + Carboplatin. VSS. Today he reports moderate fatigue and cancer-associated Right shoulder pain. Patient received Keytruda. Tolerated well. Pre-medicated for chemo with aloxi/dex. Patient received Alimta + Carboplatin. Tolerated treatment well. No reactions noted during visit. Patient received discharge instructions and follow up appointments. Verbalized understanding and ambulated unassisted off unit by self, in NAD.

## 2020-05-21 ENCOUNTER — TELEPHONE (OUTPATIENT)
Dept: HEMATOLOGY/ONCOLOGY | Facility: CLINIC | Age: 55
End: 2020-05-21

## 2020-05-21 ENCOUNTER — NURSE TRIAGE (OUTPATIENT)
Dept: ADMINISTRATIVE | Facility: CLINIC | Age: 55
End: 2020-05-21

## 2020-05-21 ENCOUNTER — DOCUMENTATION ONLY (OUTPATIENT)
Dept: HEMATOLOGY/ONCOLOGY | Facility: CLINIC | Age: 55
End: 2020-05-21

## 2020-05-21 ENCOUNTER — OFFICE VISIT (OUTPATIENT)
Dept: VASCULAR SURGERY | Facility: CLINIC | Age: 55
End: 2020-05-21
Payer: COMMERCIAL

## 2020-05-21 VITALS
WEIGHT: 185.44 LBS | SYSTOLIC BLOOD PRESSURE: 128 MMHG | BODY MASS INDEX: 27.46 KG/M2 | HEIGHT: 69 IN | DIASTOLIC BLOOD PRESSURE: 82 MMHG

## 2020-05-21 DIAGNOSIS — R05.9 COUGH: ICD-10-CM

## 2020-05-21 DIAGNOSIS — F17.218 NICOTINE DEPENDENCE, CIGARETTES, WITH OTHER NICOTINE-INDUCED DISORDERS: ICD-10-CM

## 2020-05-21 DIAGNOSIS — I63.9 CEREBROVASCULAR ACCIDENT (CVA), UNSPECIFIED MECHANISM: Primary | ICD-10-CM

## 2020-05-21 PROCEDURE — 99999 PR PBB SHADOW E&M-EST. PATIENT-LVL III: CPT | Mod: PBBFAC,,, | Performed by: SURGERY

## 2020-05-21 PROCEDURE — 99999 PR PBB SHADOW E&M-EST. PATIENT-LVL III: ICD-10-PCS | Mod: PBBFAC,,, | Performed by: SURGERY

## 2020-05-21 PROCEDURE — 99214 OFFICE O/P EST MOD 30 MIN: CPT | Mod: S$PBB,,, | Performed by: SURGERY

## 2020-05-21 PROCEDURE — 99213 OFFICE O/P EST LOW 20 MIN: CPT | Mod: PBBFAC | Performed by: SURGERY

## 2020-05-21 PROCEDURE — 99214 PR OFFICE/OUTPT VISIT, EST, LEVL IV, 30-39 MIN: ICD-10-PCS | Mod: S$PBB,,, | Performed by: SURGERY

## 2020-05-21 RX ORDER — ACETAMINOPHEN AND CODEINE PHOSPHATE 120; 12 MG/5ML; MG/5ML
5 SOLUTION ORAL EVERY 6 HOURS PRN
Qty: 180 ML | Refills: 6 | Status: SHIPPED | OUTPATIENT
Start: 2020-05-21 | End: 2020-06-03

## 2020-05-21 NOTE — PROGRESS NOTES
Michele Bennett MD, RPVI                       [facility]                     05/21/2020      HPI:  Luis Felipe Hawk is a 54 y.o. male with   Patient Active Problem List   Diagnosis    Coronary artery disease involving native coronary artery of native heart with angina pectoris 2013 [facility] and stent mLCx    Smoker 1 PPD    Essential hypertension    Dyslipidemia    Type 2 diabetes mellitus without complication, without long-term current use of insulin    Adenocarcinoma, lung, right    Mass of right lung    Adjustment disorder with mixed anxiety and depressed mood    Health problem in family    Preop testing    being managed by PCP and specialists who is here today for evaluation of carotid artery occlusive disease.  Patient has not been previously diagnosed with carotid stenosis.  No history of TIA.  + history of CVA in 2000.  No amaurosis fugax.  No lateralizing symptoms.  No prior carotid surgery or stenting.  No prior neck radiation or neck surgery.  Does ambulate on own without functional limitation.  No chest pain and shortness of breath.  He is not on daily Aspirin.  No Plavix.   No statin.  BP is well controlled.  He is being managed for metastatic lung CA.    no MI  Tobacco use: active  Anticoagulation: no    Interval history:  No new neuro events.  On ASA, statin.      Past Medical History:   Diagnosis Date    CAD (coronary artery disease) 2013    one stent placed    DMII (diabetes mellitus, type 2)     Hypertension     Lung cancer     Mass of right lung     Mass of upper lobe of right lung 3/16/2020    3/13/2020 CT chest: Right upper lobe mass obliterating the anterior segmental bronchus. This is associated with right hilar and paramediastinal adenopathy.  This is concerning for bronchogenic carcinoma.  Sampling recommended.     Past Surgical History:   Procedure Laterality Date    CV STENT      X 1    INSERTION OF TUNNELED CENTRAL VENOUS CATHETER (CVC) WITH  SUBCUTANEOUS PORT N/A 5/7/2020    Procedure: INSERTION, PORT-A-CATH;  Surgeon: [facility];  Location: [facility] OR;  Service: Radiology;  Laterality: N/A;  RN PREOP 5/7/20--rapid covid done    LUNG BIOPSY N/A 4/1/2020    Procedure: BIOPSY, LUNG;  Surgeon: [facility] Diagnostic Provider;  Location: [facility] OR;  Service: Radiology;  Laterality: N/A;  730AM STARTRN PHONE PREOP 3/31/2020     Family History   Problem Relation Age of Onset    Peripheral vascular disease Mother     Heart disease Father     No Known Problems Brother     No Known Problems Brother     No Known Problems Son     No Known Problems Son     No Known Problems Daughter     No Known Problems Daughter     No Known Problems Daughter      Social History     Socioeconomic History    Marital status: Single     Spouse name: Not on file    Number of children: Not on file    Years of education: Not on file    Highest education level: Not on file   Occupational History    Occupation:      Employer: [employer]   Social Needs    Financial resource strain: Not on file    Food insecurity:     Worry: Not on file     Inability: Not on file    Transportation needs:     Medical: Not on file     Non-medical: Not on file   Tobacco Use    Smoking status: Current Every Day Smoker     Packs/day: 1.00     Years: 43.00     Pack years: 43.00     Types: Cigarettes    Smokeless tobacco: Never Used   Substance and Sexual Activity    Alcohol use: Yes     Alcohol/week: 3.0 standard drinks     Types: 3 Shots of liquor per week     Frequency: 2-4 times a month    Drug use: No    Sexual activity: Yes     Partners: Female   Lifestyle    Physical activity:     Days per week: Not on file     Minutes per session: Not on file    Stress: Not on file   Relationships    Social connections:     Talks on phone: Not on file     Gets together: Not on file     Attends [employer] service: Not on file     Active member of club or organization: Not on  file     Attends meetings of clubs or organizations: Not on file     Relationship status: Not on file   Other Topics Concern    Not on file   Social History Narrative    Not on file       Current Outpatient Medications:     acetaminophen with codeine (ACETAMINOPHEN-CODEINE) 120mg 12mg 5mL Soln solution, Take 5 mLs by mouth every 6 (six) hours as needed (cough)., Disp: 180 mL, Rfl: 6    aspirin (ECOTRIN) 81 MG EC tablet, Take 1 tablet (81 mg total) by mouth once daily., Disp: 30 tablet, Rfl: 11    atorvastatin (LIPITOR) 80 MG tablet, Take 1 tablet (80 mg total) by mouth once daily., Disp: 90 tablet, Rfl: 3    benzonatate (TESSALON) 100 MG capsule, Take 100 mg by mouth 3 (three) times daily as needed for Cough., Disp: , Rfl:     buPROPion (WELLBUTRIN SR) 150 MG TBSR 12 hr tablet, Take 1 tablet by mouth once daily for 3 days, then take 1 tablet by mouth twice daily thereafter, Disp: 60 tablet, Rfl: 0    cyproheptadine (PERIACTIN) 4 mg tablet, Take 1 tablet (4 mg total) by mouth 4 (four) times daily., Disp: 120 tablet, Rfl: 0    folic acid (FOLVITE) 1 MG tablet, Take 1 tablet (1 mg total) by mouth once daily., Disp: 30 tablet, Rfl: 6    gabapentin (NEURONTIN) 300 MG capsule, Take 1 capsule (300 mg total) by mouth 3 (three) times daily., Disp: 90 capsule, Rfl: 6    hydroCHLOROthiazide (HYDRODIURIL) 25 MG tablet, TK 1 T PO ONE TIME PER DAY FOR BP, Disp: 90 tablet, Rfl: 3    metFORMIN (GLUCOPHAGE) 500 MG tablet, 1 po qAM x 1 wk; then 1 po BID x 1 wk; then 2 AM/1 PM x 1 wk; then 2 BID maintenance. Take with food, Disp: 120 tablet, Rfl: 5    metoprolol succinate (TOPROL-XL) 25 MG 24 hr tablet, Take 1 tablet (25 mg total) by mouth once daily., Disp: 90 tablet, Rfl: 3    morphine (MS CONTIN) 60 MG 12 hr tablet, Take 1 tablet (60 mg total) by mouth every 8 (eight) hours., Disp: 90 tablet, Rfl: 0    ondansetron (ZOFRAN) 4 MG tablet, Take 1 tablet (4 mg total) by mouth every 6 (six) hours as needed for Nausea.,  Disp: 90 tablet, Rfl: 6    oxyCODONE (ROXICODONE) 5 MG immediate release tablet, Take 1 tablet (5 mg total) by mouth every 4 (four) hours as needed for Pain., Disp: 90 tablet, Rfl: 0    polyethylene glycol (MIRALAX) 17 gram PwPk, Take 17 g by mouth 2 (two) times daily as needed (Constipation)., Disp: 10 each, Rfl: 6    senna-docusate 8.6-50 mg (PERICOLACE) 8.6-50 mg per tablet, Take 2 tablets by mouth once daily., Disp: 60 tablet, Rfl: 6    acetaminophen (TYLENOL) 325 MG tablet, Take 325 mg by mouth every 6 (six) hours as needed for Pain., Disp: , Rfl:     albuterol (PROVENTIL/VENTOLIN HFA) 90 mcg/actuation inhaler, Inhale 2 puffs into the lungs every 6 (six) hours as needed for Wheezing. Whichever brand covered by insurance (Patient not taking: Reported on 5/21/2020), Disp: 18 g, Rfl: 0    blood-glucose meter kit, To check BG 1 times daily, to use with insurance preferred meter (Patient not taking: Reported on 5/21/2020), Disp: 1 each, Rfl: 0    REVIEW OF SYSTEMS:  General: No fevers or chills; ENT: No sore throat; Allergy and Immunology: no persistent infections; Hematological and Lymphatic: No history of bleeding or easy bruising; Endocrine: negative; Respiratory: no cough, shortness of breath, or wheezing; Cardiovascular: no chest pain or dyspnea on exertion; Gastrointestinal: no abdominal pain/back, change in bowel habits, or bloody stools; Genito-Urinary: no dysuria, trouble voiding, or hematuria; Musculoskeletal: negative; Neurological: no TIA or stroke symptoms; Psychiatric: no nervousness, anxiety or depression.    PHYSICAL EXAM:        General appearance:  Alert, well-appearing, and in no distress.  Oriented to person, place, and time                    Neurological: Normal speech, no focal findings noted; CN II - XII grossly intact. All ext sensation to light touch. EOMI.  Tongue midline           Musculoskeletal: Digits/nail without cyanosis/clubbing.  Strength 5/5 all ext.                     Neck: Supple, no significant adenopathy, no carotid bruit can be auscultated                  Chest:  Clear to auscultation, no wheezes, rales or rhonchi, symmetric air entry. No use of accessory muscles               Cardiac: Normal rate and regular rhythm, S1 and S2 normal            Abdomen: Soft, nontender, nondistended, no masses or organomegaly, no hernia     No rebound tenderness noted; bowel sounds normal     Pulsatile aortic mass is non palpable.     No groin adenopathy      Extremities:  2+ radial pulse     No ext edema    Skin: No tissue loss    LAB RESULTS:  No results found for: CBC  Lab Results   Component Value Date    LABPROT 11.2 05/07/2020    INR 1.0 05/07/2020     Lab Results   Component Value Date     05/20/2020    K 3.8 05/20/2020     05/20/2020    CO2 24 05/20/2020     (H) 05/20/2020    BUN 13 05/20/2020    CREATININE 0.8 05/20/2020    CALCIUM 9.2 05/20/2020    ANIONGAP 11 05/20/2020    EGFRNONAA >60 05/20/2020     Lab Results   Component Value Date    WBC 9.48 05/20/2020    RBC 4.61 05/20/2020    HGB 12.3 (L) 05/20/2020    HCT 39.2 (L) 05/20/2020    MCV 85 05/20/2020    MCH 26.7 (L) 05/20/2020    MCHC 31.4 (L) 05/20/2020    RDW 14.2 05/20/2020     (H) 05/20/2020    MPV 9.2 05/20/2020    GRAN 5.9 05/20/2020    GRAN 62.1 05/20/2020    LYMPH 2.5 05/20/2020    LYMPH 26.5 05/20/2020    MONO 0.9 05/20/2020    MONO 9.7 05/20/2020    EOS 0.1 05/20/2020    BASO 0.02 05/20/2020    EOSINOPHIL 1.3 05/20/2020    BASOPHIL 0.2 05/20/2020    DIFFMETHOD Automated 05/20/2020     .  Lab Results   Component Value Date    HGBA1C 9.3 (H) 04/24/2020       IMAGING:  All pertinent imaging has been reviewed and interpreted independently.    5/2020 Carotid US:  1. Right carotid ultrasound shows 0-19% internal carotid artery stenosis.  Antegrade vertebral artery flow.  There is a lymph node in the proximal neck lateral to the common carotid artery measuring 2.8 x 1.7 x 3.6 cm.  2. Left carotid  ultrasound shows 0-19% internal carotid artery stenosis.  Antegrade vertebral artery flow.    IMP/PLAN:  54 y.o. male with   Patient Active Problem List   Diagnosis    Coronary artery disease involving native coronary artery of native heart with angina pectoris 2013 [facility] and stent mLCx    Smoker 1 PPD    Essential hypertension    Dyslipidemia    Type 2 diabetes mellitus without complication, without long-term current use of insulin    Adenocarcinoma, lung, right    Mass of right lung    Adjustment disorder with mixed anxiety and depressed mood    Health problem in family    Preop testing    being managed by PCP and specialists who is here today for evaluation of findings of chronic infarct on MRI brain.    -No significant carotid artery stenosis, no recent stroke or TIA with bilateral chronic infarcts on MRI brain 4/2020 - recommend continued best medical therapy with ASA, statin and BP control to prevent secondary stroke.   -Neurology referral  -Heart healthy lifestyle  -Exercise  -I discussed smoking cessation at length with this patient, including treatment options of nicotine replacement therapy and management of the addiction with assistance by [facility].  The patient states understanding that continued smoking will increase the chances of stenosis progression and other cardiovascular morbidity.  -RTC 1 year with carotid US    I spent 5 minutes evaluating this patient and greater than 50% of the time was spent counseling, coordinator care and discussing the plan of care.  All questions were answered and patient stated understanding with agreement with the above treatment plan.    Michele Bennett MD [facility]  Vascular and Endovascular Surgery

## 2020-05-21 NOTE — TELEPHONE ENCOUNTER
Tc to patient to inquire as to any adverse events following C1 chemo yesterday.  Significant other stated he has had no N/V but has had one event of diarrhea   When he awoke this am he had stool in his under garments.  She stated he has eaten today with no difficulty   Instructed her to give him a dose of Immodium and make sure he gets several quarts of fluid today and the next several days   Stated she would obtain some from pharmacy now and give him a dose. Advised her if he had multiple events of diarrhea over the next 24 hours to contact the physician in am   She acknowledged understanding   Advised her that the authorization for his pain medication was  requested yesterday,received  Today ,and forwarded to [facility] She thanked office for assisting with this .

## 2020-05-21 NOTE — TELEPHONE ENCOUNTER
[facility] stated that the patient insurance will only pay for 180 ml. Please resend prescription to pharmacy

## 2020-05-21 NOTE — TELEPHONE ENCOUNTER
"Pts wife calling about Rx and it ended up being at a different Pharmacy. Told her to call us back if any problems  Reason for Disposition   Medication questions   Caller requesting a refill, no triage required, and triager able to refill per unit policy    Additional Information   Negative: [1] Caller is not with the adult (patient) AND [2] reporting urgent symptoms   Negative: Lab result questions   Negative: Drug overdose and nurse unable to answer question   Negative: Caller requesting information not related to medicine   Negative: Caller requesting a prescription for Strep throat and has a positive culture result   Negative: Rash while taking a medication or within 3 days of stopping it   Negative: Immunization reaction suspected   Negative: [1] Asthma AND [2] having symptoms of asthma (cough, wheezing, etc)   Negative: MORE THAN A DOUBLE DOSE of a prescription or over-the-counter (OTC) drug   Negative: [1] DOUBLE DOSE (an extra dose or lesser amount) of over-the-counter (OTC) drug AND [2] any symptoms (e.g., dizziness, nausea, pain, sleepiness)   Negative: [1] DOUBLE DOSE (an extra dose or lesser amount) of prescription drug AND [2] any symptoms (e.g., dizziness, nausea, pain, sleepiness)   Negative: Took another person's prescription drug   Negative: [1] Request for URGENT new prescription or refill of "essential" medication (i.e., likelihood of harm to patient if not taken) AND [2] triager unable to fill per unit policy   Negative: Diabetes drug error or overdose (e.g., insulin or extra dose)   Negative: [1] DOUBLE DOSE (an extra dose or lesser amount) of prescription drug AND [2] NO symptoms (Exception: a double dose of antibiotics)   Negative: [1] Prescription not at pharmacy AND [2] was prescribed today by PCP   Negative: Pharmacy calling with prescription questions and triager unable to answer question   Negative: Caller has urgent medication question about med that PCP prescribed and " triager unable to answer question   Negative: Caller has NON-URGENT medication question about med that PCP prescribed and triager unable to answer question   Negative: Caller requesting a NON-URGENT new prescription or refill and triager unable to refill per unit policy   Negative: Caller has medication question about med not prescribed by PCP and triager unable to answer question (e.g., compatibility with other med, storage)   Negative: [1] DOUBLE DOSE (an extra dose or lesser amount) of over-the-counter (OTC) drug AND [2] NO symptoms   Negative: [1] DOUBLE DOSE (an extra dose or lesser amount) of antibiotic drug AND [2] NO symptoms   Negative: Caller requesting information about medication use with breastfeeding; neither adult nor infant is ill, and triager answers question   Negative: Caller requesting information about medication during pregnancy; adult is not ill and triager answers question   Negative: Caller has medication question, adult has minor symptoms, caller declines triage, and triager answers question   Negative: Caller has medication question only, adult not sick, and triager answers question    Protocols used: INFORMATION ONLY CALL-A-, MEDICATION QUESTION CALL-A-AH

## 2020-05-21 NOTE — PATIENT INSTRUCTIONS
Carotid Artery Problems: Blockage     A healthy carotid artery lets blood flow easily to the brain.   The blood carries oxygen and nutrients throughout the body. The carotid arteries are large blood vessels that carry blood to the brain. Certain health problems can make the inside of the carotid arteries narrow and rough. Over time, this damage increases the chances of having a stroke. A stroke is a sudden loss of brain function.   Open carotid arteries  In a healthy carotid artery, the inside of the artery is open. The lining of the artery wall is also smooth. This lets blood flow freely from the heart to the brain. The brain gets all the oxygen and nutrients it needs to function well.  Narrowed carotid arteries  Health factors, such as high blood pressure, high cholesterol, smoking, and diabetes, can damage artery walls and make them rough. This allows cholesterol and other particles in the blood to stick to the artery walls and form plaque or fatty deposits. As the plaque builds up, it can narrow the artery. Blood may also collect on the plaque and form blood clots.  How a stroke happens     Emboli can enter the bloodstream and travel to the brain. Brain tissue is damaged when emboli block arteries in the brain.   A stroke can happen when plaque in the carotid artery ruptures. This can allow small pieces of plaque to break off into the bloodstream. At the same time, rupture can make more blood clots. The pieces of plaque and tiny blood clots or emboli can flow in the blood until they get stuck in a small blood vessel in the brain. This blocks blood flow to part of the brain and causes a stroke.  Date Last Reviewed: 6/8/2015  © 7502-6878 [employer]. [address] [place] [address], [place] [address]. All rights reserved. This information is not intended as a substitute for professional medical care. Always follow your healthcare professional's instructions.

## 2020-05-21 NOTE — LETTER
May 21, 2020        El Carmona MD  [address] [facility]  [place] [address]             [facility]  [address]  [facility] [address]  Phone: [phone]  Fax: [phone]   Patient: Luis Felipe Hawk   MR Number: [id number]   YOB: 1965   Date of Visit: 5/21/2020       Dear Dr. Carmona:    Thank you for referring Luis Felipe Hawk to me for evaluation. Below are the relevant portions of my assessment and plan of care.            If you have questions, please do not hesitate to call me. I look forward to following Luis Felipe along with you.    Sincerely,      Michele Bennett MD           CC  No Recipients

## 2020-05-22 ENCOUNTER — HOSPITAL ENCOUNTER (EMERGENCY)
Facility: HOSPITAL | Age: 55
Discharge: HOME | End: 2020-05-22
Attending: EMERGENCY MEDICINE
Payer: COMMERCIAL

## 2020-05-22 ENCOUNTER — NURSE TRIAGE (OUTPATIENT)
Dept: ADMINISTRATIVE | Facility: CLINIC | Age: 55
End: 2020-05-22

## 2020-05-22 VITALS
SYSTOLIC BLOOD PRESSURE: 146 MMHG | HEART RATE: 94 BPM | OXYGEN SATURATION: 96 % | RESPIRATION RATE: 20 BRPM | DIASTOLIC BLOOD PRESSURE: 94 MMHG | HEIGHT: 69 IN | BODY MASS INDEX: 27.4 KG/M2 | TEMPERATURE: 99 F | WEIGHT: 185 LBS

## 2020-05-22 DIAGNOSIS — R00.0 TACHYCARDIA: ICD-10-CM

## 2020-05-22 DIAGNOSIS — J18.9 PNEUMONIA DUE TO INFECTIOUS ORGANISM, UNSPECIFIED LATERALITY, UNSPECIFIED PART OF LUNG: Primary | ICD-10-CM

## 2020-05-22 LAB
ALBUMIN SERPL BCP-MCNC: 3 G/DL (ref 3.5–5.2)
ALP SERPL-CCNC: 107 U/L (ref 55–135)
ALT SERPL W/O P-5'-P-CCNC: 9 U/L (ref 10–44)
ANION GAP SERPL CALC-SCNC: 13 MMOL/L (ref 8–16)
AST SERPL-CCNC: 14 U/L (ref 10–40)
BACTERIA #/AREA URNS HPF: ABNORMAL /HPF
BASOPHILS # BLD AUTO: 0.01 K/UL (ref 0–0.2)
BASOPHILS NFR BLD: 0.1 % (ref 0–1.9)
BILIRUB SERPL-MCNC: 0.6 MG/DL (ref 0.1–1)
BILIRUB UR QL STRIP: NEGATIVE
BNP SERPL-MCNC: 13 PG/ML (ref 0–99)
BUN SERPL-MCNC: 12 MG/DL (ref 6–20)
CALCIUM SERPL-MCNC: 9.9 MG/DL (ref 8.7–10.5)
CHLORIDE SERPL-SCNC: 105 MMOL/L (ref 95–110)
CLARITY UR: CLEAR
CO2 SERPL-SCNC: 20 MMOL/L (ref 23–29)
COLOR UR: YELLOW
CREAT SERPL-MCNC: 0.7 MG/DL (ref 0.5–1.4)
DIFFERENTIAL METHOD: ABNORMAL
EOSINOPHIL # BLD AUTO: 0.1 K/UL (ref 0–0.5)
EOSINOPHIL NFR BLD: 0.7 % (ref 0–8)
ERYTHROCYTE [DISTWIDTH] IN BLOOD BY AUTOMATED COUNT: 14.2 % (ref 11.5–14.5)
EST. GFR  (AFRICAN AMERICAN): >60 ML/MIN/1.73 M^2
EST. GFR  (NON AFRICAN AMERICAN): >60 ML/MIN/1.73 M^2
GLUCOSE SERPL-MCNC: 117 MG/DL (ref 70–110)
GLUCOSE UR QL STRIP: NEGATIVE
HCT VFR BLD AUTO: 38.9 % (ref 40–54)
HGB BLD-MCNC: 12.2 G/DL (ref 14–18)
HGB UR QL STRIP: ABNORMAL
IMM GRANULOCYTES # BLD AUTO: 0.03 K/UL (ref 0–0.04)
IMM GRANULOCYTES NFR BLD AUTO: 0.3 % (ref 0–0.5)
KETONES UR QL STRIP: NEGATIVE
LACTATE SERPL-SCNC: 1.6 MMOL/L (ref 0.5–2.2)
LEUKOCYTE ESTERASE UR QL STRIP: ABNORMAL
LYMPHOCYTES # BLD AUTO: 2 K/UL (ref 1–4.8)
LYMPHOCYTES NFR BLD: 21 % (ref 18–48)
MAGNESIUM SERPL-MCNC: 2 MG/DL (ref 1.6–2.6)
MCH RBC QN AUTO: 26.6 PG (ref 27–31)
MCHC RBC AUTO-ENTMCNC: 31.4 G/DL (ref 32–36)
MCV RBC AUTO: 85 FL (ref 82–98)
MICROSCOPIC COMMENT: ABNORMAL
MONOCYTES # BLD AUTO: 0.3 K/UL (ref 0.3–1)
MONOCYTES NFR BLD: 2.9 % (ref 4–15)
NEUTROPHILS # BLD AUTO: 7 K/UL (ref 1.8–7.7)
NEUTROPHILS NFR BLD: 75 % (ref 38–73)
NITRITE UR QL STRIP: NEGATIVE
NRBC BLD-RTO: 0 /100 WBC
PH UR STRIP: 5 [PH] (ref 5–8)
PHOSPHATE SERPL-MCNC: 3.9 MG/DL (ref 2.7–4.5)
PLATELET # BLD AUTO: 404 K/UL (ref 150–350)
PMV BLD AUTO: 9.3 FL (ref 9.2–12.9)
POTASSIUM SERPL-SCNC: 4.4 MMOL/L (ref 3.5–5.1)
PROCALCITONIN SERPL IA-MCNC: 0.07 NG/ML
PROT SERPL-MCNC: 7.3 G/DL (ref 6–8.4)
PROT UR QL STRIP: NEGATIVE
RBC # BLD AUTO: 4.58 M/UL (ref 4.6–6.2)
RBC #/AREA URNS HPF: 2 /HPF (ref 0–4)
SARS-COV-2 RDRP RESP QL NAA+PROBE: NEGATIVE
SODIUM SERPL-SCNC: 138 MMOL/L (ref 136–145)
SP GR UR STRIP: 1.02 (ref 1–1.03)
SQUAMOUS #/AREA URNS HPF: 10 /HPF
TROPONIN I SERPL DL<=0.01 NG/ML-MCNC: <0.006 NG/ML (ref 0–0.03)
URN SPEC COLLECT METH UR: ABNORMAL
UROBILINOGEN UR STRIP-ACNC: NEGATIVE EU/DL
WBC # BLD AUTO: 9.37 K/UL (ref 3.9–12.7)
WBC #/AREA URNS HPF: 30 /HPF (ref 0–5)

## 2020-05-22 PROCEDURE — 83880 ASSAY OF NATRIURETIC PEPTIDE: CPT

## 2020-05-22 PROCEDURE — 63600175 PHARM REV CODE 636 W HCPCS: Performed by: EMERGENCY MEDICINE

## 2020-05-22 PROCEDURE — 83735 ASSAY OF MAGNESIUM: CPT

## 2020-05-22 PROCEDURE — 80053 COMPREHEN METABOLIC PANEL: CPT

## 2020-05-22 PROCEDURE — 82962 GLUCOSE BLOOD TEST: CPT

## 2020-05-22 PROCEDURE — U0002 COVID-19 LAB TEST NON-CDC: HCPCS

## 2020-05-22 PROCEDURE — 84484 ASSAY OF TROPONIN QUANT: CPT

## 2020-05-22 PROCEDURE — 96360 HYDRATION IV INFUSION INIT: CPT

## 2020-05-22 PROCEDURE — 81000 URINALYSIS NONAUTO W/SCOPE: CPT

## 2020-05-22 PROCEDURE — 87086 URINE CULTURE/COLONY COUNT: CPT

## 2020-05-22 PROCEDURE — 87040 BLOOD CULTURE FOR BACTERIA: CPT

## 2020-05-22 PROCEDURE — 84145 PROCALCITONIN (PCT): CPT

## 2020-05-22 PROCEDURE — 93010 ELECTROCARDIOGRAM REPORT: CPT | Mod: ,,, | Performed by: INTERNAL MEDICINE

## 2020-05-22 PROCEDURE — 83605 ASSAY OF LACTIC ACID: CPT

## 2020-05-22 PROCEDURE — 84100 ASSAY OF PHOSPHORUS: CPT

## 2020-05-22 PROCEDURE — 93010 EKG 12-LEAD: ICD-10-PCS | Mod: ,,, | Performed by: INTERNAL MEDICINE

## 2020-05-22 PROCEDURE — 93005 ELECTROCARDIOGRAM TRACING: CPT

## 2020-05-22 PROCEDURE — 25000003 PHARM REV CODE 250: Performed by: EMERGENCY MEDICINE

## 2020-05-22 PROCEDURE — 85025 COMPLETE CBC W/AUTO DIFF WBC: CPT

## 2020-05-22 PROCEDURE — 99285 EMERGENCY DEPT VISIT HI MDM: CPT | Mod: 25

## 2020-05-22 RX ORDER — LEVOFLOXACIN 750 MG/1
750 TABLET ORAL DAILY
Qty: 4 TABLET | Refills: 0 | Status: SHIPPED | OUTPATIENT
Start: 2020-05-23 | End: 2020-05-27

## 2020-05-22 RX ORDER — ACETAMINOPHEN 500 MG
1000 TABLET ORAL
Status: COMPLETED | OUTPATIENT
Start: 2020-05-22 | End: 2020-05-22

## 2020-05-22 RX ORDER — ACETAMINOPHEN AND CODEINE PHOSPHATE 120; 12 MG/5ML; MG/5ML
5 SOLUTION ORAL EVERY 6 HOURS PRN
Qty: 180 ML | Refills: 6 | Status: CANCELLED | OUTPATIENT
Start: 2020-05-22

## 2020-05-22 RX ORDER — LEVOFLOXACIN 750 MG/1
750 TABLET ORAL
Status: COMPLETED | OUTPATIENT
Start: 2020-05-22 | End: 2020-05-22

## 2020-05-22 RX ADMIN — LEVOFLOXACIN 750 MG: 750 TABLET, FILM COATED ORAL at 10:05

## 2020-05-22 RX ADMIN — SODIUM CHLORIDE 1000 ML: 0.9 INJECTION, SOLUTION INTRAVENOUS at 09:05

## 2020-05-22 RX ADMIN — ACETAMINOPHEN 1000 MG: 500 TABLET ORAL at 08:05

## 2020-05-23 NOTE — ED TRIAGE NOTES
Pt arrives to the ED reports, fever, cough, nasal congestion. Pt states that the symptoms started on yesterday. Pt reports feeling restless accompanied with body aches and fever. Pt also reports a painful productive cough (yellow), chest congestion, sweats and chills. OTC meds not helping. Temp in triage is 100.7. Pt has lung cancer and just started chemo treatment a few days ago.

## 2020-05-23 NOTE — TELEPHONE ENCOUNTER
Patient's wife calling with patient present. She reports patient began sweating tonight on his forehead. He denies chest pain/sob. +myalgia.  Wife reports she is going to go to the store in an attempt to find a thermometer. I have encouraged her to call back if the patient develops any new symptoms or has a fever. She verbalizes understanding.    Reason for Disposition   Health Information question, no triage required and triager able to answer question    Additional Information   Negative: Shock suspected (e.g., cold/pale/clammy skin, too weak to stand, low BP, rapid pulse)   Negative: Sounds like a life-threatening emergency to the triager   Negative: Difficulty breathing   Negative: Patient sounds very sick or weak to the triager   Negative: [1] SEVERE sweating (e.g., drenched with sweat) AND [2] cause unknown   Negative: [1] NIGHT SWEATS occur (e.g., drenching sweat that occurs at night and has to change bed clothes or bed sheets) AND [2] cause unknown   Negative: [1] MODERATE sweating (e.g., interferes with normal activities like work or school) AND [2] possibly related to new medication or change in medication dosage   Negative: [1] MODERATE sweating (e.g., interferes with normal activities like work or school) AND [2] cause unknown AND [3]  new onset in the last 4 weeks   Negative: [1] Weight loss > 10 pounds (5 kg) AND [2] not dieting   Negative: [1] MODERATE sweating (e.g., interferes with normal activities like work or sleep) AND [2] menopause is suspected   Negative: [1] MODERATE sweating (e.g., interferes with normal activities like work or school; causes embarrassment in social situations) AND [2] involves just face, hands (palms), underarms, or feet (soles) AND [3] present > 4 weeks   Negative: Excessive sweating is a chronic symptom (recurrent or ongoing AND present > 4 weeks)   Negative: Normal sweating from exercise   Negative: Normal sweating from heat exposure   Negative:  Menopause, questions about    Protocols used: INFORMATION ONLY CALL-A-AH, [id number]

## 2020-05-23 NOTE — ED NOTES
Pt resting in bed with NAD noted. No needs voiced per pt. And family on phone updated at this time.

## 2020-05-23 NOTE — ED PROVIDER NOTES
Encounter Date: 5/22/2020    SCRIBE #1 NOTE: I, Candido Sheppard, am scribing for, and in the presence of,  Cyril Wilson MD. I have scribed the following portions of the note - Other sections scribed: HPI, ROS, PE.       History     Chief Complaint   Patient presents with    Generalized Body Aches     Symptoms started on yesterday. Pt reports feeling restless accompanied with body aches and fever. Pt also reports a painful productive cough (yellow), chest congestion, sweats and chills. OTC meds not helping.  Temp in triage is 100.7. Pt has lung cancer and just started chemo treatment a few days ago.     Fever    Cough     Time seen by provider: 8:43 PM on 05/22/2020  This is a 54 y.o. male who presents to the ED with c/o fever and myalgias since yesterday. Pt also reports a painful productive cough (yellow). Temp in triage is 100.7. Pt has lung cancer and just started chemo treatment a few days ago. Gradual onset. Symptoms are constant and discomfort is rated 8/10 in severity. Pt took some OTC cough meds with no relief. No mitigating or exacerbating factors reported. Associated sxs include chest congestion, diaphoresis, and chills. Patient denies any sick contact, and all other sxs at this time.           The history is provided by the patient and medical records.     Review of patient's allergies indicates:   Allergen Reactions    Tramadol      Chest burning     Past Medical History:   Diagnosis Date    CAD (coronary artery disease) 2013    one stent placed    DMII (diabetes mellitus, type 2)     Hypertension     Lung cancer     Mass of right lung     Mass of upper lobe of right lung 3/16/2020    3/13/2020 CT chest: Right upper lobe mass obliterating the anterior segmental bronchus. This is associated with right hilar and paramediastinal adenopathy.  This is concerning for bronchogenic carcinoma.  Sampling recommended.     Past Surgical History:   Procedure Laterality Date    CV STENT      X 1     INSERTION OF TUNNELED CENTRAL VENOUS CATHETER (CVC) WITH SUBCUTANEOUS PORT N/A 5/7/2020    Procedure: INSERTION, PORT-A-CATH;  Surgeon: Dosc Diagnostic Provider;  Location: [facility] OR;  Service: Radiology;  Laterality: N/A;  RN PREOP 5/7/20--rapid covid done    LUNG BIOPSY N/A 4/1/2020    Procedure: BIOPSY, LUNG;  Surgeon: Dosc Diagnostic Provider;  Location: [facility] OR;  Service: Radiology;  Laterality: N/A;  730AM STARTRN PHONE PREOP 3/31/2020     Family History   Problem Relation Age of Onset    Peripheral vascular disease Mother     Heart disease Father     No Known Problems Brother     No Known Problems Brother     No Known Problems Son     No Known Problems Son     No Known Problems Daughter     No Known Problems Daughter     No Known Problems Daughter      Social History     Tobacco Use    Smoking status: Current Every Day Smoker     Packs/day: 0.50     Years: 43.00     Pack years: 21.50     Types: Cigarettes    Smokeless tobacco: Never Used   Substance Use Topics    Alcohol use: Yes     Alcohol/week: 3.0 standard drinks     Types: 3 Shots of liquor per week     Frequency: 2-4 times a month    Drug use: No     Review of Systems   Constitutional: Positive for chills, diaphoresis and fever.   HENT: Positive for congestion. Negative for sore throat.    Eyes: Negative.  Negative for visual disturbance.   Respiratory: Positive for cough. Negative for shortness of breath.    Cardiovascular: Negative for chest pain and palpitations.   Gastrointestinal: Negative for abdominal pain, blood in stool, diarrhea, nausea and vomiting.        NO melena or rectal bleeding   Genitourinary: Negative for dysuria, flank pain, frequency and testicular pain.   Musculoskeletal: Positive for myalgias. Negative for back pain.   Skin: Negative for rash and wound.   Neurological: Negative for dizziness, syncope, speech difficulty, weakness, numbness and headaches.        No numbness   Psychiatric/Behavioral: Negative for  confusion.   All other systems reviewed and are negative.      Physical Exam     Initial Vitals [05/22/20 2012]   BP Pulse Resp Temp SpO2   122/67 104 (!) 24 (!) 100.7 °F (38.2 °C) 95 %      MAP       --         Physical Exam    Nursing note and vitals reviewed.  Constitutional: He appears well-developed and well-nourished. He is not diaphoretic. No distress.   HENT:   Head: Normocephalic and atraumatic.   Right Ear: External ear normal.   Left Ear: External ear normal.   Mouth/Throat: Oropharynx is clear and moist.   Eyes: Conjunctivae and EOM are normal. Pupils are equal, round, and reactive to light. Right eye exhibits no discharge. Left eye exhibits no discharge. No scleral icterus.   Neck: Normal range of motion. Neck supple. No thyromegaly present. No tracheal deviation present. No JVD present.   Cardiovascular: Regular rhythm, normal heart sounds and intact distal pulses. Tachycardia present.  Exam reveals no gallop and no friction rub.    No murmur heard.  HR of 110 upon arrival   Pulmonary/Chest: Breath sounds normal. No stridor. No respiratory distress. He has no wheezes. He has no rhonchi. He has no rales. He exhibits no tenderness.   There is a port located on the R anterior chest wall with no SSTI   Abdominal: Soft. He exhibits no distension and no mass. There is no tenderness. There is no rebound and no guarding.   Musculoskeletal: Normal range of motion. He exhibits no edema or tenderness.   Lymphadenopathy:     He has no cervical adenopathy.   Neurological: He is alert and oriented to person, place, and time. He has normal strength. GCS score is 15. GCS eye subscore is 4. GCS verbal subscore is 5. GCS motor subscore is 6.   GAIL with NGND's   Skin: Skin is warm and dry. Capillary refill takes less than 2 seconds. No rash noted. No erythema.   Psychiatric: He has a normal mood and affect. His behavior is normal. Judgment and thought content normal.         ED Course   Procedures  Labs Reviewed   CBC  W/ AUTO DIFFERENTIAL - Abnormal; Notable for the following components:       Result Value    RBC 4.58 (*)     Hemoglobin 12.2 (*)     Hematocrit 38.9 (*)     Mean Corpuscular Hemoglobin 26.6 (*)     Mean Corpuscular Hemoglobin Conc 31.4 (*)     Platelets 404 (*)     Gran% 75.0 (*)     Mono% 2.9 (*)     All other components within normal limits   COMPREHENSIVE METABOLIC PANEL - Abnormal; Notable for the following components:    CO2 20 (*)     Glucose 117 (*)     Albumin 3.0 (*)     ALT 9 (*)     All other components within normal limits   URINALYSIS, REFLEX TO URINE CULTURE - Abnormal; Notable for the following components:    Occult Blood UA 1+ (*)     Leukocytes, UA 1+ (*)     All other components within normal limits    Narrative:     Preferred Collection Type->Urine, Clean Catch   URINALYSIS MICROSCOPIC - Abnormal; Notable for the following components:    WBC, UA 30 (*)     All other components within normal limits    Narrative:     Preferred Collection Type->Urine, Clean Catch   CULTURE, BLOOD   CULTURE, BLOOD   CULTURE, URINE   LACTIC ACID, PLASMA   MAGNESIUM   PHOSPHORUS   TROPONIN I   PROCALCITONIN   B-TYPE NATRIURETIC PEPTIDE   SARS-COV-2 RNA AMPLIFICATION, QUAL   LACTIC ACID, PLASMA     EKG Readings: (Independently Interpreted)   Initial Reading: No STEMI. Rhythm: Sinus Tachycardia. Heart Rate: 99. Ectopy: No Ectopy. T Waves Flipped: AVR and V1. Axis: Normal. Q Waves: II, III and AVF. Clinical Impression: Sinus Tachycardia       Imaging Results           X-Ray Chest AP Portable (Final result)  Result time 05/22/20 20:47:26    Final result by Tiago Núñez MD (05/22/20 20:47:26)                 Impression:      1. Stable right upper lobe pulmonary mass at the level of the right hilum consistent with neoplasm.  2. Osseous destruction with associated mass involving the 3rd rib on the right laterally new from the prior x-ray consistent with metastatic disease.  3. Mild right infrahilar/basilar infiltrate could  represent mild pneumonitis.  4.  This report was flagged in Epic as abnormal.      Electronically signed by: Tiago Simental  Date:    05/22/2020  Time:    20:47             Narrative:    EXAMINATION:  XR CHEST AP PORTABLE    CLINICAL HISTORY:  Sepsis;    TECHNIQUE:  Single frontal view of the chest was performed.    COMPARISON:  04/01/2020    FINDINGS:  Right upper lobe pulmonary mass at the level the hilum consistent with neoplasm.    Osseous destruction of the 3rd rib on the right laterally consistent with metastatic disease.    Minimal right infrahilar infiltrate at the lung base.    No effusion or pneumothorax.    Port catheter is noted on the right.    No edema is detected.                                 Medical Decision Making:   Differential Diagnosis:   10:36 PM reassessed pt.            Scribe Attestation:   Scribe #1: I performed the above scribed service and the documentation accurately describes the services I performed. I attest to the accuracy of the note.    Pt arrived with a low grade fever, HR in the 110's but non-toxic in appearance and in NAD with remaining VSS.  Pt states he just started chemo but had no findings of leukopenia on CBC.  Normal WBC count and lactate.  Negative COVID testing.  Presentation correlates with bronchitis vs early PNA.  Will treat outpatient with Levaquin.  Pt discharged and counseled on the need to return to the nearest emergency room if they experience any other concerning symptoms.  Pt counseled to F/U outpatient with a PCP over the next week.    Cyril Wilson MD                            Clinical Impression:       ICD-10-CM ICD-9-CM   1. Pneumonia due to infectious organism, unspecified laterality, unspecified part of lung J18.9 136.9     484.8   2. Tachycardia R00.0 785.0             ED Disposition Condition    Discharge Stable        ED Prescriptions     Medication Sig Dispense Start Date End Date Auth. Provider    levoFLOXacin (LEVAQUIN) 750 MG tablet Take 1  tablet (750 mg total) by mouth once daily. for 4 days 4 tablet 5/23/2020 5/27/2020 Cyril Wilson MD        Follow-up Information     Follow up With Specialties Details Why Contact Info    [place] MD Internal Medicine Schedule an appointment as soon as possible for a visit in 3 days to follow-up on today's visit [address] [facility]  [place] [address]  [phone]      [facility] Emergency Medicine Go to  As needed, If symptoms worsen [address] [facility] [address] [phone]                       I, Cyril Wilson, personally performed the services described in this documentation. All medical record entries made by the scribe were at my direction and in my presence.  I have reviewed the chart and agree that the record reflects my personal performance and is accurate and complete.                 Cyril Wilson MD  05/23/20 0129

## 2020-05-24 LAB
BACTERIA UR CULT: NORMAL
POCT GLUCOSE: 111 MG/DL (ref 70–110)

## 2020-05-25 ENCOUNTER — PATIENT OUTREACH (OUTPATIENT)
Dept: ADMINISTRATIVE | Facility: OTHER | Age: 55
End: 2020-05-25

## 2020-05-25 NOTE — PROGRESS NOTES
LINKS immunization registry triggered, Care Everywhere and Health Maintenance updated.  Chart reviewed for overdue [facility]active [facility] health maintenance testing.

## 2020-05-25 NOTE — TELEPHONE ENCOUNTER
I called the patient and he states he went to the ER on 5/22/20 and was diagnosed with pneumonia.  He was prescribed antibiotics and states he is now feeling better.  We discussed that his cancer predisposes him to develop PNA's and that we would need to continue to monitor.  The patient expressed understanding.  All questions were answered to his satisfaction.    Richar Avendaño MD  Hematology and Oncology  [facility]  Office:[phone]  Fax: [phone]

## 2020-05-26 ENCOUNTER — NURSE TRIAGE (OUTPATIENT)
Dept: ADMINISTRATIVE | Facility: CLINIC | Age: 55
End: 2020-05-26

## 2020-05-26 LAB
BACTERIA BLD CULT: NORMAL
BACTERIA BLD CULT: NORMAL

## 2020-05-26 NOTE — TELEPHONE ENCOUNTER
Spoke with patient's girlfriend Annabelle she is stating that patient is having 8/10 chest and shoulder pain.  States that patient has had a heart attack in the past but does not think this is related.  States that she believes chest pain is related to cancer. Annabelle states that patient is currently taking Morphine, gabapentin, and OTC tylenol.  Annabelle states that patient is refusing to take tylenol with codeine she states that patient reports he does not want to take medication as it keeps him from coughing up phlegm.  Advised Annabelle and patient to call 911 for immediate medical attention.  Both declined.  Further advised to call 911 Annabelle and patient verbalized understanding.       Reason for Disposition   [1] Chest pain lasts > 5 minutes AND [2] history of heart disease  (i.e., heart attack, bypass surgery, angina, angioplasty, CHF; not just a heart murmur)    Additional Information   Negative: Severe difficulty breathing (e.g., struggling for each breath, speaks in single words)   Negative: Difficult to awaken or acting confused (e.g., disoriented, slurred speech)   Negative: Shock suspected (e.g., cold/pale/clammy skin, too weak to stand, low BP, rapid pulse)    Protocols used: CHEST PAIN-A-AH

## 2020-05-27 ENCOUNTER — OFFICE VISIT (OUTPATIENT)
Dept: ENDOCRINOLOGY | Facility: CLINIC | Age: 55
End: 2020-05-27
Payer: COMMERCIAL

## 2020-05-27 DIAGNOSIS — Z72.0 TOBACCO ABUSE: ICD-10-CM

## 2020-05-27 DIAGNOSIS — I25.119 CORONARY ARTERY DISEASE INVOLVING NATIVE CORONARY ARTERY OF NATIVE HEART WITH ANGINA PECTORIS: ICD-10-CM

## 2020-05-27 PROCEDURE — 2024F 7 FLD RTA PHOTO EVC RTNOPTHY: CPT | Mod: 95,,, | Performed by: NURSE PRACTITIONER

## 2020-05-27 PROCEDURE — 2024F PR 7 FIELD PHOTOS WITH INTERP/ REVIEW: ICD-10-PCS | Mod: 95,,, | Performed by: NURSE PRACTITIONER

## 2020-05-27 PROCEDURE — 99213 PR OFFICE/OUTPT VISIT, EST, LEVL III, 20-29 MIN: ICD-10-PCS | Mod: 95,,, | Performed by: NURSE PRACTITIONER

## 2020-05-27 PROCEDURE — 99213 OFFICE O/P EST LOW 20 MIN: CPT | Mod: 95,,, | Performed by: NURSE PRACTITIONER

## 2020-05-27 NOTE — TELEPHONE ENCOUNTER
Spoke with the patient this morning and he said he feels pretty good this morning.  Patient has no complains.  Patient verbalized understandings.

## 2020-05-27 NOTE — PATIENT INSTRUCTIONS
A1C goal: <7%  Fasting/premeal blood glucose goal:   2 hour post-meal blood glucose goal: less than 180      Recommend testing blood sugar 1x/day - before breakfast or 2 hours after a meal.   Continue metformin.   Work on maintaining healthy diet and avoiding sugary drinks.   Return to clinic in 2 months with labs prior.

## 2020-05-27 NOTE — PROGRESS NOTES
The patient location is: [place]  The chief complaint leading to consultation is: type 2 diabetes    Visit type: audiovisual    Face to Face time with patient: 14 minutes of total time spent on the encounter, which includes face to face time and non-face to face time preparing to see the patient (eg, review of tests), Obtaining and/or reviewing separately obtained history, Documenting clinical information in the electronic or other health record, Independently interpreting results (not separately reported) and communicating results to the patient/family/caregiver, or Care coordination (not separately reported).         Each patient to whom he or she provides medical services by telemedicine is:  (1) informed of the relationship between the physician and patient and the respective role of any other health care provider with respect to management of the patient; and (2) notified that he or she may decline to receive medical services by telemedicine and may withdraw from such care at any time.         CC: This 54 y.o. Black or  male  is here for evaluation of  T2DM along with comorbidities indicated in the Visit Diagnosis section of this encounter.    HPI: Luis Felipe Hawk was diagnosed with T2DM in 11/2019. Metformin started at time of diagnosis     Initial visit 4/27/20  New to Endocrine. His partner Annabelle is present for the visit.   He is being treated for adenocarcinoma of the right lung with mets. Has h/o CAD s/p 2013 LHC and stent mLCx; CVA 2000.   Plan Avoid beverages with sugar.   See Diabetes Educator/Registered Dietician for Medical Nutrition Therapy.   Encouraged exercise - walk 1/2 hour daily. Continue metformin.   Test blood sugar 2x/day before breakfast and before supper/bedtime.   Will consider insulin once a day at next visit if blood sugars are uncontrolled.   Return to clinic in a month.       Interval history  Checking BG every 1 or 2 days, usually immediately after breakfast.   to 124. He has cut down on cold drinks - maybe 1 drink every 2-3 days. No more coffee with sugar.  Has cut down on smoking as well - 7 cigarettes per day.   He has done an audio visit with dietician. Not interested in doing a f/u visit.     Patient went to ED last week with fever and cough. He was tested negative three times for COVID-19. Pt has lung cancer and just started chemo treatment last week.       LAST DIABETES EDUCATION: 4/29/20 - audio visit    RECENT ILLNESSES/HOSPITALIZATIONS - no.     HOSPITALIZED FOR DIABETES OR RELATED COMPLICATIONS - no     PRESCRIBED DIABETES MEDICATIONS: metformin 1000 mg bid with food (2 tablets bid)  Misses medication doses - No    DM COMPLICATIONS: cardiovascular disease and cerebrovascular disease    SIGNIFICANT DIABETES MED HISTORY: n/a     SELF MONITORING BLOOD GLUCOSE: Checks blood glucose at home - as above.   HYPOGLYCEMIC EPISODES:      CURRENT DIET: drinks water. Eats 3 meals/day but sometimes skips meals. No snacks. Lunch was turkey wings, greens, gravy, rice. No supper. No appetite. Likes fruit and white rice.     CURRENT EXERCISE: none       There were no vitals taken for this visit.    ROS:   CONSTITUTIONAL: Appetite ok, denies fatigue  RESPIRATORY: + dyspnea upon exertion  MS: shoulder pain        PHYSICAL EXAM:          Hemoglobin A1C   Date Value Ref Range Status   04/24/2020 9.3 (H) 4.0 - 5.6 % Final     Comment:     ADA Screening Guidelines:  5.7-6.4%  Consistent with prediabetes  >or=6.5%  Consistent with diabetes  High levels of fetal hemoglobin interfere with the HbA1C  assay. Heterozygous hemoglobin variants (HbS, HgC, etc)do  not significantly interfere with this assay.   However, presence of multiple variants may affect accuracy.     03/13/2020 10.1 (H) 4.0 - 5.6 % Final     Comment:     ADA Screening Guidelines:  5.7-6.4%  Consistent with prediabetes  >or=6.5%  Consistent with diabetes  High levels of fetal hemoglobin interfere with the HbA1C  assay.  Heterozygous hemoglobin variants (HbS, HgC, etc)do  not significantly interfere with this assay.   However, presence of multiple variants may affect accuracy.     11/21/2019 8.8 (H) 4.0 - 5.6 % Final     Comment:     ADA Screening Guidelines:  5.7-6.4%  Consistent with prediabetes  >or=6.5%  Consistent with diabetes  High levels of fetal hemoglobin interfere with the HbA1C  assay. Heterozygous hemoglobin variants (HbS, HgC, etc)do  not significantly interfere with this assay.   However, presence of multiple variants may affect accuracy.             Chemistry        Component Value Date/Time     05/22/2020 2053    K 4.4 05/22/2020 2053     05/22/2020 2053    CO2 20 (L) 05/22/2020 2053    BUN 12 05/22/2020 2053    CREATININE 0.7 05/22/2020 2053     (H) 05/22/2020 2053        Component Value Date/Time    CALCIUM 9.9 05/22/2020 2053    ALKPHOS 107 05/22/2020 2053    AST 14 05/22/2020 2053    ALT 9 (L) 05/22/2020 2053    BILITOT 0.6 05/22/2020 2053    ESTGFRAFRICA >60 05/22/2020 2053    EGFRNONAA >60 05/22/2020 2053          Lab Results   Component Value Date    LDLCALC 103.4 03/06/2020       Lab Results   Component Value Date    MICALBCREAT 3.8 03/13/2020             STANDARDS of CARE:        ASA:       yes        Last eye exam:       ASSESSMENT and PLAN:    A1C GOAL:     1. Diabetes mellitus type 2, uncontrolled, with complications  Reported glucoses are at goal. However, needs to test more regularly.     Recommend testing blood sugar 1x/day - before breakfast or 2 hours after a meal.   Continue metformin.   Work on maintaining healthy diet and avoiding sugary drinks.   Return to clinic in 2 months with labs prior.     Hemoglobin A1C    Lipid Panel   2. Coronary artery disease involving native coronary artery of native heart with angina pectoris 2013 [facility] and stent mLCx  Lipid panel.      3.  Tobacco abuse  Praised him on his progress.            Orders Placed This Encounter   Procedures     Hemoglobin A1C     Standing Status:   Future     Standing Expiration Date:   7/26/2021    Lipid Panel     Standing Status:   Future     Standing Expiration Date:   5/27/2021        Follow up in about 2 months (around 7/27/2020).         This service was not originating from a related E/M service provided within the previous 7 days nor will  to an E/M service or procedure within the next 24 hours or my soonest available appointment.  Prevailing standard of care was able to be met in this audio-only visit.

## 2020-05-28 ENCOUNTER — TELEPHONE (OUTPATIENT)
Dept: ENDOCRINOLOGY | Facility: CLINIC | Age: 55
End: 2020-05-28

## 2020-05-28 NOTE — TELEPHONE ENCOUNTER
Spoke with the pt's michael Alanis, she states this is the directions she was doing to try to get the pt's glucose reading:  Cleaning the pt's finger with alcohol  Sticking the pt's finger  Wiping the first blood that comes out  Collecting the blood on the strip  Turning the machine on  Place the strip in the machine the part with the blood on it  The machine is reading error    Spoke with Annabelle and informed her that her first 3 steps were right but she should turn the machine on, place the strip in the machine not the side that collects the blood and then collect the blood on the strip.     Annabelle states she will try again before the pt eats again and will call back if she is having any trouble.

## 2020-05-28 NOTE — TELEPHONE ENCOUNTER
----- Message from Sil Gonzalez sent at 5/28/2020 11:25 AM CDT -----  Contact: Annabelle (Linda)  Name of Who is Calling : Annabelle Monsalve)    Annabelle Monsalve) is requesting a call from staff in regards to glucometer having hard time using on patient needing assistance   .....Please contact to further discuss and advise.    Can the clinic reply by [facility] : No    What Number to Call Back :  [phone]

## 2020-05-29 ENCOUNTER — TELEPHONE (OUTPATIENT)
Dept: HEMATOLOGY/ONCOLOGY | Facility: CLINIC | Age: 55
End: 2020-05-29

## 2020-05-29 DIAGNOSIS — C34.91 ADENOCARCINOMA, LUNG, RIGHT: Primary | ICD-10-CM

## 2020-05-29 NOTE — TELEPHONE ENCOUNTER
I called the patient whom stated he still is having significant amount of cough and right sided chest pain.  I informed him that I would set him up with an appt with palliative care for symptom management.  The patient expressed understanding.  All questions were answered to his satisfaction.    Richar Avendaño MD  Hematology and Oncology  [facility]  Office:[phone]  Fax: [phone]

## 2020-06-01 DIAGNOSIS — K59.03 THERAPEUTIC OPIOID INDUCED CONSTIPATION: ICD-10-CM

## 2020-06-01 DIAGNOSIS — T40.2X5A THERAPEUTIC OPIOID INDUCED CONSTIPATION: ICD-10-CM

## 2020-06-01 NOTE — TELEPHONE ENCOUNTER
I called the patient and he stated he was still having significant pain and cough which neeed management.  I informed his that I would reach out to the schedulers and have him see palliative care.  If there were no appts available, will proceed with radiation oncology appt.  The patient expressed understanding.  All questions were answered to his satisfaction.    Richar Avendaño MD  Hematology and Oncology  [facility]  Office:[phone]  Fax: [phone]

## 2020-06-01 NOTE — TELEPHONE ENCOUNTER
Annabelle called for the patient states that the patient is still having a lot of pain in his back,should,and arm with no relief. She also states that the patient is having a lot of phlegm and if you can call him in something for it as well.

## 2020-06-02 RX ORDER — POLYETHYLENE GLYCOL 3350 17 G/17G
17 POWDER, FOR SOLUTION ORAL 2 TIMES DAILY PRN
Qty: 30 EACH | Refills: 6 | Status: SHIPPED | OUTPATIENT
Start: 2020-06-02 | End: 2020-07-15 | Stop reason: SDUPTHER

## 2020-06-03 ENCOUNTER — INITIAL CONSULT (OUTPATIENT)
Dept: PALLIATIVE MEDICINE | Facility: CLINIC | Age: 55
End: 2020-06-03
Payer: MEDICAID

## 2020-06-03 VITALS
BODY MASS INDEX: 26.74 KG/M2 | HEIGHT: 69 IN | TEMPERATURE: 98 F | OXYGEN SATURATION: 96 % | WEIGHT: 180.56 LBS | DIASTOLIC BLOOD PRESSURE: 69 MMHG | SYSTOLIC BLOOD PRESSURE: 124 MMHG | HEART RATE: 103 BPM

## 2020-06-03 DIAGNOSIS — Z51.5 PALLIATIVE CARE ENCOUNTER: ICD-10-CM

## 2020-06-03 DIAGNOSIS — Z71.89 ADVANCE CARE PLANNING: ICD-10-CM

## 2020-06-03 DIAGNOSIS — C34.91 ADENOCARCINOMA, LUNG, RIGHT: ICD-10-CM

## 2020-06-03 DIAGNOSIS — R52 PAIN MANAGEMENT: Primary | ICD-10-CM

## 2020-06-03 PROCEDURE — 99205 PR OFFICE/OUTPT VISIT, NEW, LEVL V, 60-74 MIN: ICD-10-PCS | Mod: S$GLB,,, | Performed by: NURSE PRACTITIONER

## 2020-06-03 PROCEDURE — 99205 OFFICE O/P NEW HI 60 MIN: CPT | Mod: S$GLB,,, | Performed by: NURSE PRACTITIONER

## 2020-06-03 PROCEDURE — 99213 OFFICE O/P EST LOW 20 MIN: CPT | Mod: PBBFAC | Performed by: NURSE PRACTITIONER

## 2020-06-03 PROCEDURE — 99999 PR PBB SHADOW E&M-EST. PATIENT-LVL III: ICD-10-PCS | Mod: PBBFAC,,, | Performed by: NURSE PRACTITIONER

## 2020-06-03 PROCEDURE — 99999 PR PBB SHADOW E&M-EST. PATIENT-LVL III: CPT | Mod: PBBFAC,,, | Performed by: NURSE PRACTITIONER

## 2020-06-03 RX ORDER — OXYCODONE AND ACETAMINOPHEN 5; 325 MG/1; MG/1
1 TABLET ORAL EVERY 4 HOURS PRN
Qty: 120 TABLET | Refills: 0 | Status: SHIPPED | OUTPATIENT
Start: 2020-06-03 | End: 2020-06-05 | Stop reason: SDUPTHER

## 2020-06-03 NOTE — PROGRESS NOTES
Addendum:6 /28  Patient called office regarding percocet not lasting 4 hours and having to take more so he will be out sooner than expected. I am increasing dosage and prescribing enough to get him until he can schedule his earliest appt.         Consult Note  Palliative Care      Consult Requested By: Richar Avendaño  Reason for Consult: symptom management      ASSESSMENT/PLAN:      Impression: Patient having uncontrolled pain and cough. Pain is 9/10 currently, constant burning, aching, pressure in right side under right arm and radiating to back. Pain worse with movement. Patient's girlfriend participating via phone call and reports patient is taking the MS contin q 8 hours now due to pain although his med rec is q 12 hours. I asked about schedule and the schedule is not really a schedule but according to how much he is hurting. She states he doesn't sleep well at night and complains of severe pain in early hours of am between 4-6 so she gives it to him. Then he takes 8 hours from that and then 8 hours from that so there is no schedule resulting in poor pain management.  He has no breakthrough pain medication so we discussed using prn medication this along with a regular schedule for the MS contin. Patient coughing during encounter and having trouble clearing due to pain with coughing. He states that he has trouble clearing his secretions due to pain when coughing. He does c/o productive cough with white or yellow sputum. No fever. He reports that he at one time had Codeine cough syrup but he did not like the way he felt because he slept for 3 days straight and it scared him. We discussed the breakthrough pain medication should also help with cough. Patient also c/o insomnia and not sleeping well at night. He is appears very tired during our encounter and he almost goes to sleep at times. His girlfriend states he is hallucinating at times during the day. This could be from fatigue and sleeplessness and discussed  getting on a regular pain medication schedule with MS contin and the addition of breakthrough pain med may help him sleep at night. I did advise against him driving.       Advance Care Planning     [facility]  I engaged the patient in a conversation about advance care planning and we specifically addressed what the goals of care would be moving forward We discussed as his cancer advances he needs to consider what he would want in certain circumstances ie: ventilatory support and if so for how long, CPR and any other life sustaining treatments. He and his girlfriend say they will discuss this.          Power of   I initiated the process of advance care planning today and explained the importance of this process to the patient.  I introduced the concept of advance directives to the patient, as well. Then the patient received detailed information about the importance of designating a Health Care Power of  (HCPOA). He was also instructed to communicate with this person about their wishes for future healthcare, should he become sick and lose decision-making capacity. The patient has not previously appointed a HCPOA. After our discussion, the patient has decided to complete a HCPOA and has appointed his girlfriend  Annabelle Ponce. Document scanned in to Epic today       Plan/Recommendations:    -Symptom management   Pain/cough: MS Contin 60 mg q 8 hours, Percocet 5/325 1 tablet q 4 hours prn for breakthrough pain/cough   -AD completed for HPOA  -Palliative will continue to follow PRN for symptom management               Signature:    > 60 minutes of visit spent in chart review, face to face discussion of goals of care, symptom assessment, coordination of care and emotional support.      SUBJECTIVE:       24h Oral Morphine Equivalents 180 mg daily     Bowel Management Plan (BMP): Yes (x )  NO  (  )    OBJECTIVE:   Symptom Assessment (ESAS 0-10 scale)     ESAS 0 1 2 3 4 5 6 7 8 9 10   Pain          x    Dyspnea x              Anxiety x             Nausea x             Depression  x             Anorexia x             Fatigue     x         Insomnia       x       Restlessness  x             Agitation x             C        Labs:  CBC:   WBC   Date Value Ref Range Status   05/22/2020 9.37 3.90 - 12.70 K/uL Final     Hemoglobin   Date Value Ref Range Status   05/22/2020 12.2 (L) 14.0 - 18.0 g/dL Final     Hematocrit   Date Value Ref Range Status   05/22/2020 38.9 (L) 40.0 - 54.0 % Final     Mean Corpuscular Volume   Date Value Ref Range Status   05/22/2020 85 82 - 98 fL Final     Platelets   Date Value Ref Range Status   05/22/2020 404 (H) 150 - 350 K/uL Final       BMP: @HVHQAPBCY64(GLU,NA,K,CL,CO2,BUN,CREATININE,CALCIUM,MG)@    LFT:   Lab Results   Component Value Date    AST 14 05/22/2020    ALKPHOS 107 05/22/2020    BILITOT 0.6 05/22/2020       Albumin:   Albumin   Date Value Ref Range Status   05/22/2020 3.0 (L) 3.5 - 5.2 g/dL Final     Protein:   Total Protein   Date Value Ref Range Status   05/22/2020 7.3 6.0 - 8.4 g/dL Final       LACTIC ACID:   Lab Results   Component Value Date    LACTATE 1.6 05/22/2020

## 2020-06-03 NOTE — LETTER
Kendra 3, 2020      Richar Avendaño MD  [address] [facility] [address]  [address]  [place] [address]           [facility] [place] - Palliative Medicine  [address]  [place] [address]  Phone: [phone]  Fax: [phone]          Patient: Luis Felipe Hawk   MR Number: [id number]   YOB: 1965   Date of Visit: 6/3/2020       Dear Dr. Richar Avendaño:    Thank you for referring Luis Felipe Hawk to me for evaluation. Attached you will find relevant portions of my assessment and plan of care.    If you have questions, please do not hesitate to call me. I look forward to following Luis Felipe Hawk along with you.    Sincerely,    Nellie Herrera, HEMANT    Enclosure  CC:  No Recipients    If you would like to receive this communication electronically, please contact [email or web address] or [phone] to request more information on [employer] Link access.    For providers and/or their staff who would like to refer a patient to [facility], please contact us through our one-stop-shop provider referral line, [facility], at [phone].    If you feel you have received this communication in error or would no longer like to receive these types of communications, please e-mail [email or web address]

## 2020-06-05 ENCOUNTER — TELEPHONE (OUTPATIENT)
Dept: PHARMACY | Facility: CLINIC | Age: 55
End: 2020-06-05

## 2020-06-05 DIAGNOSIS — C34.91 ADENOCARCINOMA, LUNG, RIGHT: Primary | ICD-10-CM

## 2020-06-05 RX ORDER — OXYCODONE AND ACETAMINOPHEN 5; 325 MG/1; MG/1
1 TABLET ORAL EVERY 4 HOURS PRN
Qty: 120 TABLET | Refills: 0 | Status: SHIPPED | OUTPATIENT
Start: 2020-06-05 | End: 2020-06-29

## 2020-06-08 ENCOUNTER — CLINICAL SUPPORT (OUTPATIENT)
Dept: SMOKING CESSATION | Facility: CLINIC | Age: 55
End: 2020-06-08
Payer: COMMERCIAL

## 2020-06-08 DIAGNOSIS — F17.200 NICOTINE DEPENDENCE: ICD-10-CM

## 2020-06-08 DIAGNOSIS — Z12.11 COLON CANCER SCREENING: ICD-10-CM

## 2020-06-08 PROCEDURE — 99999 PR PBB SHADOW E&M-EST. PATIENT-LVL I: CPT | Mod: PBBFAC,,,

## 2020-06-08 PROCEDURE — 99402 PREV MED CNSL INDIV APPRX 30: CPT | Mod: S$GLB,,,

## 2020-06-08 PROCEDURE — 99999 PR PBB SHADOW E&M-EST. PATIENT-LVL I: ICD-10-PCS | Mod: PBBFAC,,,

## 2020-06-08 PROCEDURE — 99402 PR PREVENT COUNSEL,INDIV,30 MIN: ICD-10-PCS | Mod: S$GLB,,,

## 2020-06-08 RX ORDER — BUPROPION HYDROCHLORIDE 150 MG/1
150 TABLET, EXTENDED RELEASE ORAL 2 TIMES DAILY
Qty: 60 TABLET | Refills: 0 | Status: SHIPPED | OUTPATIENT
Start: 2020-06-08 | End: 2020-06-22 | Stop reason: SDUPTHER

## 2020-06-08 NOTE — PROGRESS NOTES
Individual Follow-Up Form    6/8/2020    Quit Date: tbd    Clinical Status of Patient: Outpatient    Length of Service: 30 minutes    Continuing Medication: yes  Wellbutrin    Other Medications: none     Target Symptoms: Withdrawal and medication side effects. The following were  rated moderate (3) to severe (4) on TCRS:  · Moderate (3): none  · Severe (4): none    Comments: Telephonic visit due to Covid 19 pandemic.     Patient continues to smoke. Pt remains on tobacco cessation medication of 150 mg Wellbutrin BID .  Pt doing well with rate reduction and wait times prior to smoking.  Pt encouraged to pick a quit day. Offered to add nicotine patches at this time, but patient declined , said he feels he is doing well with just Wellbutrin.    Reviewed coping strategies/habitual behavior/relapse prevention with patient.  Encouraged patient to call me any time.    Diagnosis: F17.200    Next Visit: 1 week

## 2020-06-08 NOTE — Clinical Note
Patient continues to smoke. Pt remains on tobacco cessation medication of 150 mg Wellbutrin BID .  Pt doing well with rate reduction and wait times prior to smoking.  Pt encouraged to pick a quit day. Offered to add nicotine patches at this time, but patient declined , said he feels he is doing well with just Wellbutrin.    Reviewed coping strategies/habitual behavior/relapse prevention with patient.  Encouraged patient to call me any time.

## 2020-06-10 ENCOUNTER — OFFICE VISIT (OUTPATIENT)
Dept: HEMATOLOGY/ONCOLOGY | Facility: CLINIC | Age: 55
End: 2020-06-10
Payer: MEDICAID

## 2020-06-10 DIAGNOSIS — E78.5 DYSLIPIDEMIA: ICD-10-CM

## 2020-06-10 DIAGNOSIS — K59.03 THERAPEUTIC OPIOID INDUCED CONSTIPATION: ICD-10-CM

## 2020-06-10 DIAGNOSIS — E11.9 TYPE 2 DIABETES MELLITUS WITHOUT COMPLICATION, WITHOUT LONG-TERM CURRENT USE OF INSULIN: ICD-10-CM

## 2020-06-10 DIAGNOSIS — I25.10 CORONARY ARTERY DISEASE, ANGINA PRESENCE UNSPECIFIED, UNSPECIFIED VESSEL OR LESION TYPE, UNSPECIFIED WHETHER NATIVE OR TRANSPLANTED HEART: ICD-10-CM

## 2020-06-10 DIAGNOSIS — R05.9 COUGH: ICD-10-CM

## 2020-06-10 DIAGNOSIS — G89.3 CANCER RELATED PAIN: ICD-10-CM

## 2020-06-10 DIAGNOSIS — C34.91 ADENOCARCINOMA, LUNG, RIGHT: Primary | ICD-10-CM

## 2020-06-10 DIAGNOSIS — I10 ESSENTIAL HYPERTENSION: ICD-10-CM

## 2020-06-10 DIAGNOSIS — I63.9 CEREBROVASCULAR ACCIDENT (CVA), UNSPECIFIED MECHANISM: ICD-10-CM

## 2020-06-10 DIAGNOSIS — T40.2X5A THERAPEUTIC OPIOID INDUCED CONSTIPATION: ICD-10-CM

## 2020-06-10 PROCEDURE — 99215 PR OFFICE/OUTPT VISIT, EST, LEVL V, 40-54 MIN: ICD-10-PCS | Mod: 95,,, | Performed by: INTERNAL MEDICINE

## 2020-06-10 PROCEDURE — 99215 OFFICE O/P EST HI 40 MIN: CPT | Mod: 95,,, | Performed by: INTERNAL MEDICINE

## 2020-06-10 NOTE — PROGRESS NOTES
Established Patient - Audio Only Telehealth Visit     The patient location is: Home  The chief complaint leading to consultation is: NSCLC  Visit type: Virtual visit with audio only (telephone)  Total time spent with patient: 13 minutes       The reason for the audio only service rather than synchronous audio and video virtual visit was related to technical difficulties or patient preference/necessity.     Each patient to whom I provide medical services by telemedicine is:  (1) informed of the relationship between the physician and patient and the respective role of any other health care provider with respect to management of the patient; and (2) notified that they may decline to receive medical services by telemedicine and may withdraw from such care at any time. Patient verbally consented to receive this service via voice-only telephone call.       PATIENT: Luis Felipe Hawk  MRN: [id number]  DATE: 6/10/2020      Diagnosis:   1. Adenocarcinoma, lung, right    2. Cough    3. Cancer related pain    4. Therapeutic opioid induced constipation    5. Essential hypertension    6. Type 2 diabetes mellitus without complication, without long-term current use of insulin    7. Coronary artery disease, angina presence unspecified, unspecified vessel or lesion type, unspecified whether native or transplanted heart    8. Cerebrovascular accident (CVA), unspecified mechanism    9. Dyslipidemia        Chief Complaint: Follow-up (NSCLC)    Oncologic History:      Oncologic History 11/21/19 CXR  3/13/20 CT Chest  4/01/20 IR biopsy  4/09/20 PET/CT  5/07/20 PORT placement    Oncologic Treatment 5/13/20 B 12 injection  5/20/20 Carboplatin/Alimta/Pembrolizumab cycle 2    Pathology 4/01/20 -  adenocarcinoma suggestive of a possible gastrointestinal origin     Initial History:   The patient was initially seen by Dr Carmona on 11/21/19 for a cough and underwent a CXR showing a soft tissue mass in the anterior segment of the right upper lobe that  measures at least 4 cm in cranial-caudad extent.  Per chart review an attempt to contact MR Hawk was mad on multiple occasions in order to have a CT of the chest done.  CT of the chest was eventually done on 3/13/20 and showed enlarged right paratracheal LN measuring 2.8 cm, slightly enlarged right hilar nodes, a mass within the anterior segment of the right upper lobe measuring 3.9 x 4.7 cm abutting the right suprahilar region and obliterating the anterior segmental bronchus.  The patient underwent IR guided biopsy of the right lung on 4/01/20 showing adenocarcinoma suggestive of a possible gastrointestinal origin.  MRI of the brain on 4/09/20 showed no evidence of intracranial metastatic disease, remote small infarcts within the right superior frontal lobe and remote lacunar-type infarcts of the bilateral lentiform nuclei and right thalamus.  PET/CT on 4/09/20 showed a 6.5 x 3.7 cm pulmonary mass in the anterior segment of the right upper lobe abutting the mediastinal pleura, large right pretracheal soft tissue mass measuring 4.4 x 3.8 cm, left prevascular lymph node measuring 1.0 cm, few scattered bilateral poorly defined subcentimeter pulmonary nodules, and a right chest wall soft tissue lesion partially eroding the lateral right 3rd rib measuring 2 x 2 cm.  The patient had a PORT placed in the right chest on 5/07/20. He received his B12 injection on 5/13/20.  He also underwent US of the carotid on 5/14/20 showing 0-19% internal carotid artery stenosis on the right with a lymph node in the proximal neck lateral to the common carotid artery measuring 2.8 x 1.7 x 3.6 cm.  On the left there is 0-19% stenosis.  His PORT was placed on 5/07/20.   Subjective:    Interval History: Mr. Hawk is a 54 y.o. male with HTN, HLD, DMII, CAD who presents to follow up for NSCLC adenocarcinoma.  Since the last clinic visit the patient saw Dr Bennett with vascular neurology on 5/21/20 for CVA seen on MRI of the brain 4/09/20.   Recommendations were made for ASA, statin and BP control to prevent secondary stroke.  The patient was then see in the ER on 5/22/20 with concern for PNA and treated with Levaquin.  He then saw Nellie Herrera with palliative care on 6/03/20 for symptoms management.  MS Contin 60 mg q 8 hours, Percocet 5/325 1 tablet q 4 hours prn for breakthrough pain/cough.  The patient states he still has right shoulder pain but does get relief from the MS Contin and percocet.  He is taking the percocet 4-5 times a day.  The patietn also states he may be constipated with last BM 4 days ago.  He states he is not taking pericolace daily and ran out of miralax.  He has SOB with exertion.  The patient denies abdominal pain, N/V, diarrhea.  The patient denies fever, chills, night sweats, weight loss, new lumps or bumps, easy bruising or bleeding.    Past Medical History:   Past Medical History:   Diagnosis Date    CAD (coronary artery disease) 2013    one stent placed    DMII (diabetes mellitus, type 2)     Hypertension     Lung cancer     Mass of right lung     Mass of upper lobe of right lung 3/16/2020    3/13/2020 CT chest: Right upper lobe mass obliterating the anterior segmental bronchus. This is associated with right hilar and paramediastinal adenopathy.  This is concerning for bronchogenic carcinoma.  Sampling recommended.       Past Surgical HIstory:   Past Surgical History:   Procedure Laterality Date    CV STENT      X 1    INSERTION OF TUNNELED CENTRAL VENOUS CATHETER (CVC) WITH SUBCUTANEOUS PORT N/A 5/7/2020    Procedure: INSERTION, PORT-A-CATH;  Surgeon: [facility] Diagnostic Provider;  Location: [facility] OR;  Service: Radiology;  Laterality: N/A;  RN PREOP 5/7/20--rapid covid done    LUNG BIOPSY N/A 4/1/2020    Procedure: BIOPSY, LUNG;  Surgeon: [facility] Diagnostic Provider;  Location: [facility] OR;  Service: Radiology;  Laterality: N/A;  730AM STARTRN PHONE PREOP 3/31/2020       Family History:   Family History   Problem  Relation Age of Onset    Peripheral vascular disease Mother     Heart disease Father     No Known Problems Brother     No Known Problems Brother     No Known Problems Son     No Known Problems Son     No Known Problems Daughter     No Known Problems Daughter     No Known Problems Daughter        Social History:  reports that he has been smoking cigarettes. He has a 21.50 pack-year smoking history. He has never used smokeless tobacco. He reports that he drinks about 3.0 standard drinks of alcohol per week. He reports that he does not use drugs.    Allergies:  Review of patient's allergies indicates:   Allergen Reactions    Tramadol      Chest burning       Medications:  Current Outpatient Medications   Medication Sig Dispense Refill    acetaminophen (TYLENOL) 325 MG tablet Take 325 mg by mouth every 6 (six) hours as needed for Pain.      albuterol (PROVENTIL/VENTOLIN HFA) 90 mcg/actuation inhaler Inhale 2 puffs into the lungs every 6 (six) hours as needed for Wheezing. Whichever brand covered by insurance 18 g 0    aspirin (ECOTRIN) 81 MG EC tablet Take 1 tablet (81 mg total) by mouth once daily. 30 tablet 11    atorvastatin (LIPITOR) 80 MG tablet Take 1 tablet (80 mg total) by mouth once daily. 90 tablet 3    benzonatate (TESSALON) 100 MG capsule Take 100 mg by mouth 3 (three) times daily as needed for Cough.      blood-glucose meter kit To check BG 1 times daily, to use with insurance preferred meter 1 each 0    buPROPion (WELLBUTRIN SR) 150 MG TBSR 12 hr tablet Take 1 tablet (150 mg total) by mouth 2 (two) times daily. 60 tablet 0    cyproheptadine (PERIACTIN) 4 mg tablet Take 1 tablet (4 mg total) by mouth 4 (four) times daily. 120 tablet 0    folic acid (FOLVITE) 1 MG tablet Take 1 tablet (1 mg total) by mouth once daily. 30 tablet 6    gabapentin (NEURONTIN) 300 MG capsule Take 1 capsule (300 mg total) by mouth 3 (three) times daily. 90 capsule 6    hydroCHLOROthiazide (HYDRODIURIL) 25 MG  tablet TK 1 T PO ONE TIME PER DAY FOR BP 90 tablet 3    metFORMIN (GLUCOPHAGE) 500 MG tablet 1 po qAM x 1 wk; then 1 po BID x 1 wk; then 2 AM/1 PM x 1 wk; then 2 BID maintenance. Take with food 120 tablet 5    metoprolol succinate (TOPROL-XL) 25 MG 24 hr tablet Take 1 tablet (25 mg total) by mouth once daily. 90 tablet 3    morphine (MS CONTIN) 60 MG 12 hr tablet Take 1 tablet (60 mg total) by mouth every 8 (eight) hours. 90 tablet 0    ondansetron (ZOFRAN) 4 MG tablet Take 1 tablet (4 mg total) by mouth every 6 (six) hours as needed for Nausea. 90 tablet 6    oxyCODONE-acetaminophen (PERCOCET) 5-325 mg per tablet Take 1 tablet by mouth every 4 (four) hours as needed for Pain (cough). 120 tablet 0    polyethylene glycol (MIRALAX) 17 gram PwPk Take 17 g by mouth 2 (two) times daily as needed (Constipation). 30 each 6    senna-docusate 8.6-50 mg (PERICOLACE) 8.6-50 mg per tablet Take 2 tablets by mouth once daily. 60 tablet 6     No current facility-administered medications for this visit.        Review of Systems   Constitutional: Negative for appetite change, chills, diaphoresis, fatigue, fever and unexpected weight change.   HENT: Negative for sore throat and trouble swallowing.    Respiratory: Positive for cough. Negative for shortness of breath.    Cardiovascular: Negative for chest pain and palpitations.   Gastrointestinal: Positive for constipation. Negative for abdominal pain, diarrhea, nausea and vomiting.   Musculoskeletal:        Pain in his lateral right chest radiating to his back.   Skin: Negative for color change and rash.   Neurological: Negative for headaches.   Hematological: Negative for adenopathy. Does not bruise/bleed easily.       ECOG Performance Status: 2   Objective:      Vitals:   There were no vitals filed for this visit.    Physical Exam    Laboratory Data:  No visits with results within 1 Week(s) from this visit.   Latest known visit with results is:   Admission on 05/22/2020,  Discharged on 05/22/2020   Component Date Value Ref Range Status    Blood Culture, Routine 05/22/2020 No Growth after 4 days.    Final    Blood Culture, Routine 05/22/2020 No Growth after 4 days.    Final    WBC 05/22/2020 9.37  3.90 - 12.70 K/uL Final    RBC 05/22/2020 4.58* 4.60 - 6.20 M/uL Final    Hemoglobin 05/22/2020 12.2* 14.0 - 18.0 g/dL Final    Hematocrit 05/22/2020 38.9* 40.0 - 54.0 % Final    Mean Corpuscular Volume 05/22/2020 85  82 - 98 fL Final    Mean Corpuscular Hemoglobin 05/22/2020 26.6* 27.0 - 31.0 pg Final    Mean Corpuscular Hemoglobin Conc 05/22/2020 31.4* 32.0 - 36.0 g/dL Final    RDW 05/22/2020 14.2  11.5 - 14.5 % Final    Platelets 05/22/2020 404* 150 - 350 K/uL Final    MPV 05/22/2020 9.3  9.2 - 12.9 fL Final    Immature Granulocytes 05/22/2020 0.3  0.0 - 0.5 % Final    Gran # (ANC) 05/22/2020 7.0  1.8 - 7.7 K/uL Final    Immature Grans (Abs) 05/22/2020 0.03  0.00 - 0.04 K/uL Final    Comment: Mild elevation in immature granulocytes is non specific and   can be seen in a variety of conditions including stress response,   acute inflammation, trauma and pregnancy. Correlation with other   laboratory and clinical findings is essential.      Lymph # 05/22/2020 2.0  1.0 - 4.8 K/uL Final    Mono # 05/22/2020 0.3  0.3 - 1.0 K/uL Final    Eos # 05/22/2020 0.1  0.0 - 0.5 K/uL Final    Baso # 05/22/2020 0.01  0.00 - 0.20 K/uL Final    nRBC 05/22/2020 0  0 /100 WBC Final    Gran% 05/22/2020 75.0* 38.0 - 73.0 % Final    Lymph% 05/22/2020 21.0  18.0 - 48.0 % Final    Mono% 05/22/2020 2.9* 4.0 - 15.0 % Final    Eosinophil% 05/22/2020 0.7  0.0 - 8.0 % Final    Basophil% 05/22/2020 0.1  0.0 - 1.9 % Final    Differential Method 05/22/2020 Automated   Final    Sodium 05/22/2020 138  136 - 145 mmol/L Final    Potassium 05/22/2020 4.4  3.5 - 5.1 mmol/L Final    Chloride 05/22/2020 105  95 - 110 mmol/L Final    CO2 05/22/2020 20* 23 - 29 mmol/L Final    Glucose 05/22/2020 117* 70  - 110 mg/dL Final    BUN, Bld 05/22/2020 12  6 - 20 mg/dL Final    Creatinine 05/22/2020 0.7  0.5 - 1.4 mg/dL Final    Calcium 05/22/2020 9.9  8.7 - 10.5 mg/dL Final    Total Protein 05/22/2020 7.3  6.0 - 8.4 g/dL Final    Albumin 05/22/2020 3.0* 3.5 - 5.2 g/dL Final    Total Bilirubin 05/22/2020 0.6  0.1 - 1.0 mg/dL Final    Comment: For infants and newborns, interpretation of results should be based  on gestational age, weight and in agreement with clinical  observations.  Premature Infant recommended reference ranges:  Up to 24 hours.............<8.0 mg/dL  Up to 48 hours............<12.0 mg/dL  3-5 days..................<15.0 mg/dL  6-29 days.................<15.0 mg/dL      Alkaline Phosphatase 05/22/2020 107  55 - 135 U/L Final    AST 05/22/2020 14  10 - 40 U/L Final    ALT 05/22/2020 9* 10 - 44 U/L Final    Anion Gap 05/22/2020 13  8 - 16 mmol/L Final    eGFR if African American 05/22/2020 >60  >60 mL/min/1.73 m^2 Final    eGFR if non African American 05/22/2020 >60  >60 mL/min/1.73 m^2 Final    Comment: Calculation used to obtain the estimated glomerular filtration  rate (eGFR) is the CKD-EPI equation.       Lactate (Lactic Acid) 05/22/2020 1.6  0.5 - 2.2 mmol/L Final    Comment: Falsely low lactic acid results can be found in samples   containing >=13.0 mg/dL total bilirubin and/or >=3.5 mg/dL   direct bilirubin.  Specimen slightly hemolyzed      Specimen UA 05/22/2020 Urine, Clean Catch   Final    Color, UA 05/22/2020 Yellow  Yellow, Straw, Julieta Final    Appearance, UA 05/22/2020 Clear  Clear Final    pH, UA 05/22/2020 5.0  5.0 - 8.0 Final    Specific Gravity, UA 05/22/2020 1.020  1.005 - 1.030 Final    Protein, UA 05/22/2020 Negative  Negative Final    Comment: Recommend a 24 hour urine protein or a urine   protein/creatinine ratio if globulin induced proteinuria is  clinically suspected.      Glucose, UA 05/22/2020 Negative  Negative Final    Ketones, UA 05/22/2020 Negative   Negative Final    Bilirubin (UA) 05/22/2020 Negative  Negative Final    Occult Blood UA 05/22/2020 1+* Negative Final    Nitrite, UA 05/22/2020 Negative  Negative Final    Urobilinogen, UA 05/22/2020 Negative  <2.0 EU/dL Final    Leukocytes, UA 05/22/2020 1+* Negative Final    Magnesium 05/22/2020 2.0  1.6 - 2.6 mg/dL Final    Phosphorus 05/22/2020 3.9  2.7 - 4.5 mg/dL Final    Troponin I 05/22/2020 <0.006  0.000 - 0.026 ng/mL Final    Comment: The reference interval for Troponin I represents the 99th percentile   cutoff   for our facility and is consistent with 3rd generation assay   performance.      Procalcitonin 05/22/2020 0.07  <0.25 ng/mL Final    Comment: A concentration < 0.25 ng/mL represents a low risk bacterial   infection.  Procalcitonin may not be accurate among patients with localized   infection, recent trauma or major surgery, immunosuppressed state,   invasive fungal infection, renal dysfunction. Decisions regarding   initiation or continuation of antibiotic therapy should not be based   solely on procalcitonin levels.      BNP 05/22/2020 13  0 - 99 pg/mL Final    Values of less than 100 pg/ml are consistent with non-CHF populations.    SARS-CoV-2 RNA, Amplification, Qual 05/22/2020 Negative  Negative Final    Comment: This test utilizes isothermal nucleic acid amplification   technology to detect the SARS-CoV-2 RdRp nucleic acid segment.   The analytical sensitivity (limit of detection) is 125 genome   equivalents/mL.   A POSITIVE result implies infection with the SARS-CoV-2 virus;  the patient is presumed to be contagious.    A NEGATIVE result means that SARS-CoV-2 nucleic acids are not  present above the limit of detection. A NEGATIVE result should be   treated as presumptive. It does not rule out the possibility of   COVID-19 and should not be the sole basis for treatment decisions.   If COVID-19 is strongly suspected based on clinical and exposure   history, re-testing using an  alternate molecular assay should be   considered.   This test is only for use under the Food and Drug   Administration s Emergency Use Authorization (EUA).   Commercial kits are provided by [employer].   Performance characteristics of the EUA have been independently  verified by [facility] Department o                           f  Pathology and Laboratory Medicine.   _________________________________________________________________  The ID NOW COVID-19 Letter of Authorization, along with the   authorized Fact Sheet for Healthcare Providers, the authorized Fact  Sheet for Patients, and authorized labeling are available on the [facility]   website:  www.fda.gov/MedicalDevices/Safety/EmergencySituations/itz424274.htm      RBC, UA 05/22/2020 2  0 - 4 /hpf Final    WBC, UA 05/22/2020 30* 0 - 5 /hpf Final    Bacteria 05/22/2020 Occasional  None-Occ /hpf Final    Squam Epithel, UA 05/22/2020 10  /hpf Final    Microscopic Comment 05/22/2020 SEE COMMENT   Final    Comment: Other formed elements not mentioned in the report are not   present in the microscopic examination.       Urine Culture, Routine 05/22/2020 No significant growth   Final    POCT Glucose 05/22/2020 111* 70 - 110 mg/dL Final         Imaging: CT Chest 3/13/20    Right paratracheal and large node measuring 2.8 cm, series 2, image 31.  No subcarinal or left hilar nodes.  There are slightly enlarged right hilar nodes.  The thoracic aorta is of normal caliber, there is no abnormal atherosclerotic plaque.  No pericardial effusion.  Mosaic attenuation bilaterally suggestive of underlying inflammatory small airway disease.  There is a mass within the anterior segment of the right upper lobe measuring 3.9 x 4.7 cm series 4, image 220.  The pulmonary vessels appear to be patent.    The mass abuts the right suprahilar region.  It appears to obliterate the anterior segmental bronchus.  It abuts the right paramediastinal region.  No pleural effusion.   No pneumothorax.  The axillary regions appear normal.    The upper abdominal organs demonstrate no abnormalities, the adrenal glands appear normal.  The osseous structures demonstrate no osseous lesions.    PET/CT 4/09/20    Head and neck:    -Right supraclavicular lymph node measures 1.5 cm short axis (image 33) with SUV max 12.  No additional hypermetabolic lesions in the head or neck.    Thorax:    Large right upper lobe pulmonary mass is present with several enlarged hypermetabolic mediastinal lymph nodes.  Index lesions are as follows:    - 6.5 x 3.7 cm (image 61) pulmonary mass in the anterior segment of the right upper lobe abutting the mediastinal pleura with hypermetabolic activity and SUV max of 8.9.    -Large right pretracheal soft tissue mass (likely conglomeration of lymph nodes) measures 4.4 x 3.8 cm (image 46) with SUV max 13.    -Left prevascular lymph node measures 1.0 cm short axis (image 50) with SUV max 11.    There are a few scattered bilateral poorly defined subcentimeter pulmonary nodules that are too small for detection by PET.  Persistent bibasilar pulmonary mosaic attenuation, suggestive of underlying inflammatory small airways disease.    Abdomen and pelvis:    No abnormal foci of radiotracer uptake concerning for malignancy.    Incidental:    Scattered calcification throughout the abdominal aorta and more prominently within the bilateral renal arteries.    Osseous structures:    Right chest wall soft tissue lesion partially eroding the lateral right 3rd rib measures 2 x 2 cm (image 49) with SUV max 21.    MRI Brain 4/09/20    Intracranial compartment:    Brain parenchyma demonstrates mild generalized cerebral volume loss. Scattered punctate T2/FLAIR hyperintensities are present throughout the supratentorial white matter suggestive of mild chronic microvascular ischemic change.  There are 2 small regions of encephalomalacia within the right superior frontal lobe compatible with remote  infarcts.  Small cystic-appearing spaces within the bilateral lentiform nuclei and right thalamus likely represent small lacunar type infarcts.  There is a small developmental venous anomaly within the left periventricular posterior temporal lobe.  No evidence of acute infarction.  No parenchymal hemorrhage, mass lesion, or edema.  No abnormal enhancement.  No mass effect or midline shift.    No evidence of hydrocephalus.    No extra-axial blood products or fluid collections.    Skull base T2 vascular flow voids are preserved.    Skull/extracranial contents (limited evaluation): Bone marrow signal intensity is normal.     Assessment:       1. Adenocarcinoma, lung, right    2. Cough    3. Cancer related pain    4. Therapeutic opioid induced constipation    5. Essential hypertension    6. Type 2 diabetes mellitus without complication, without long-term current use of insulin    7. Coronary artery disease, angina presence unspecified, unspecified vessel or lesion type, unspecified whether native or transplanted heart    8. Cerebrovascular accident (CVA), unspecified mechanism    9. Dyslipidemia           Plan:     NSCLC - biopsy of the right lung on 4/01/20 showed adenocarcinoma  -PET/CT on 4/09/20 showed 6.5 x 3.7 cm pulmonary mass in the anterior segment of the right upper lobe abutting the mediastinal pleura, large right pretracheal soft tissue mass measuring 4.4 x 3.8 cm, left prevascular lymph node measuring 1.0 cm, few scattered bilateral poorly defined subcentimeter pulmonary nodules, and a right chest wall soft tissue lesion partially eroding the lateral right 3rd rib measuring 2 x 2 cm  -Clinically the patient is behaving like a primary lung cancer  -I previously spoke with Dr Fregoso whom stated the pathologic pattern could be seen in an enteric lung primary  -Pt tested negative for PD-L1, ROS-1 and ALK.  -[place] [employer] showed no mutations in EGFR and BRAF.  -PORT placed in right chest on 5/07/20  -Pt received  B12 injection on 5/13/20  -Pt to receive cycle 2 of chemo with Carboplatin, Pemetrexed and Pembrolizumab on 6/11/20 pending results of lab work    Cough - pt with a cough related to his NSCLC.  -Pt previously took  tylenol with codeine.    Cancer Related pain - Pt taking 60 Mg MS Contin every 8 hours.  -Will continue gabapentin and percocet  -May consider radiation to the right chest wall in the future if pain remains difficult to control.    Opioid Induced Constipation - Pt has not been taking pericolace or miralax  -Pt instructed to start taking pericolace daily and miralax as needed    HTN - pt on HCTZ and metoprolol  -Will monitor    HLD - pt on lipitor  -PCP managing    DMII - pt's A1C is 10.1 on 3/13/20  -Pt on metformin  -Will monitor    CAD -  Pt on statin, ASA, metoprolol    CVA - recent MRI of the brain on 4/09/20 showed old infarcts in the right superior frontal lobe and remote lacunar-type infarcts of the bilateral lentiform nuclei and right thalamus  -Pt already on ASA and statin  -PT saw Dr Bennett on 5/21/20 who recommended continued ASA, statin and BP control    Advance Care Planning     Power of   I initiated the process of advance care planning today and explained the importance of this process to the patient.  I introduced the concept of advance directives to the patient, as well. Then the patient received detailed information about the importance of designating a Health Care Power of  (HCPOA). He was also instructed to communicate with this person about their wishes for future healthcare, should he become sick and lose decision-making capacity. The patient has not previously appointed a HCPOA. After our discussion, the patient has decided to complete a HCPOA and will bring the form completed to his next clinic appointment.                -Follow Up - 3 weeks with labs CBC, CMP, TSH, Mg, chemo and clinic appt with Dr Hope,  Kaitt in 6 weeks with labs PET/CT and clinic appt with  me on 7/23/20 prior to cycle 4.    Richar Avendaño MD  Hematology and Oncology  [facility]  Office:[phone]  Fax: [phone]         This service was not originating from a related E/M service provided within the previous 7 days nor will  to an E/M service or procedure within the next 24 hours or my soonest available appointment.  Prevailing standard of care was able to be met in this audio-only visit.

## 2020-06-10 NOTE — Clinical Note
The patient will need alb work tomorrow CBC, CMP, TSH, Magnesium.  Labs were ordered on 5/20/20.  He can get his treatment tomorrow.  The patient will need follow up on 7/02/20 with Dr Hope with CBC, CMP, TSH and magnesium prior to the appt.  He will need to be scheduled for treatment that day.  He will then need an appt with me on 7/23/20 with CBC, CMP, Magnesium, TSH and PET/CT prior to the appt.  He can be scheduled for cycle 4 that day as well.

## 2020-06-11 ENCOUNTER — INFUSION (OUTPATIENT)
Dept: INFUSION THERAPY | Facility: HOSPITAL | Age: 55
End: 2020-06-11
Attending: INTERNAL MEDICINE
Payer: MEDICAID

## 2020-06-11 VITALS
HEART RATE: 97 BPM | HEIGHT: 69 IN | BODY MASS INDEX: 25.92 KG/M2 | RESPIRATION RATE: 17 BRPM | SYSTOLIC BLOOD PRESSURE: 121 MMHG | TEMPERATURE: 98 F | WEIGHT: 175 LBS | DIASTOLIC BLOOD PRESSURE: 78 MMHG | OXYGEN SATURATION: 98 %

## 2020-06-11 DIAGNOSIS — C34.91 ADENOCARCINOMA, LUNG, RIGHT: Primary | ICD-10-CM

## 2020-06-11 PROCEDURE — A4216 STERILE WATER/SALINE, 10 ML: HCPCS | Performed by: INTERNAL MEDICINE

## 2020-06-11 PROCEDURE — 96367 TX/PROPH/DG ADDL SEQ IV INF: CPT

## 2020-06-11 PROCEDURE — 25000003 PHARM REV CODE 250: Performed by: INTERNAL MEDICINE

## 2020-06-11 PROCEDURE — 96413 CHEMO IV INFUSION 1 HR: CPT

## 2020-06-11 PROCEDURE — 96417 CHEMO IV INFUS EACH ADDL SEQ: CPT

## 2020-06-11 PROCEDURE — 96375 TX/PRO/DX INJ NEW DRUG ADDON: CPT

## 2020-06-11 PROCEDURE — 63600175 PHARM REV CODE 636 W HCPCS: Mod: JG | Performed by: INTERNAL MEDICINE

## 2020-06-11 PROCEDURE — 96411 CHEMO IV PUSH ADDL DRUG: CPT

## 2020-06-11 RX ORDER — HEPARIN 100 UNIT/ML
500 SYRINGE INTRAVENOUS
Status: DISCONTINUED | OUTPATIENT
Start: 2020-06-11 | End: 2020-06-11 | Stop reason: HOSPADM

## 2020-06-11 RX ORDER — HEPARIN 100 UNIT/ML
500 SYRINGE INTRAVENOUS
Status: CANCELLED | OUTPATIENT
Start: 2020-06-11

## 2020-06-11 RX ORDER — SODIUM CHLORIDE 0.9 % (FLUSH) 0.9 %
10 SYRINGE (ML) INJECTION
Status: DISCONTINUED | OUTPATIENT
Start: 2020-06-11 | End: 2020-06-11 | Stop reason: HOSPADM

## 2020-06-11 RX ORDER — SODIUM CHLORIDE 0.9 % (FLUSH) 0.9 %
10 SYRINGE (ML) INJECTION
Status: CANCELLED | OUTPATIENT
Start: 2020-06-11

## 2020-06-11 RX ADMIN — SODIUM CHLORIDE 200 MG: 9 INJECTION, SOLUTION INTRAVENOUS at 11:06

## 2020-06-11 RX ADMIN — CARBOPLATIN 705 MG: 10 INJECTION, SOLUTION INTRAVENOUS at 12:06

## 2020-06-11 RX ADMIN — HEPARIN 500 UNITS: 100 SYRINGE at 01:06

## 2020-06-11 RX ADMIN — PALONOSETRON HYDROCHLORIDE: 0.25 INJECTION INTRAVENOUS at 11:06

## 2020-06-11 RX ADMIN — SODIUM CHLORIDE 975 MG: 9 INJECTION, SOLUTION INTRAVENOUS at 12:06

## 2020-06-11 RX ADMIN — Medication 10 ML: at 01:06

## 2020-06-11 RX ADMIN — APREPITANT 130 MG: 130 INJECTION, EMULSION INTRAVENOUS at 11:06

## 2020-06-11 NOTE — PLAN OF CARE
Patient arrived to unit for C2 KeytrJo harris Carbo. Patient reported taking folic acid as ordered. Patient denies any new or worsening symptoms at this time. Plan of care reviewed, patient agreeable to plan. Patient tolerated treatment well. No sign of reaction noted. VSS. Patient received discharge instructions and follow up appointments. Patient instructed to return 7/1/20 for labs and 7/2/20 for Dr. Hope appt and chemo. Verbalized understanding and ambulated unassisted off unit. Patient in NAD at time of discharge.

## 2020-06-12 ENCOUNTER — DOCUMENTATION ONLY (OUTPATIENT)
Dept: HEMATOLOGY/ONCOLOGY | Facility: CLINIC | Age: 55
End: 2020-06-12

## 2020-06-15 ENCOUNTER — CLINICAL SUPPORT (OUTPATIENT)
Dept: SMOKING CESSATION | Facility: CLINIC | Age: 55
End: 2020-06-15
Payer: COMMERCIAL

## 2020-06-15 ENCOUNTER — TELEPHONE (OUTPATIENT)
Dept: HEMATOLOGY/ONCOLOGY | Facility: CLINIC | Age: 55
End: 2020-06-15

## 2020-06-15 DIAGNOSIS — F17.200 NICOTINE DEPENDENCE: Primary | ICD-10-CM

## 2020-06-15 PROCEDURE — 99402 PREV MED CNSL INDIV APPRX 30: CPT | Mod: S$GLB,,,

## 2020-06-15 PROCEDURE — 99999 PR PBB SHADOW E&M-EST. PATIENT-LVL I: ICD-10-PCS | Mod: PBBFAC,,,

## 2020-06-15 PROCEDURE — 99999 PR PBB SHADOW E&M-EST. PATIENT-LVL I: CPT | Mod: PBBFAC,,,

## 2020-06-15 PROCEDURE — 99402 PR PREVENT COUNSEL,INDIV,30 MIN: ICD-10-PCS | Mod: S$GLB,,,

## 2020-06-15 NOTE — TELEPHONE ENCOUNTER
Patient caretaker wanted to know if its ok for the patient to take vitamin C? Patient not eating and not drinking any fluids. Patient isn't feeling good this morning. Patient is aware Dr. Avendaño is out of the office.

## 2020-06-15 NOTE — PROGRESS NOTES
Individual Follow-Up Form    6/15/2020    Quit Date: tbd    Clinical Status of Patient: Outpatient    Length of Service: 30 minutes    Continuing Medication: yes  Wellbutrin    Other Medications: none     Target Symptoms: Withdrawal and medication side effects. The following were  rated moderate (3) to severe (4) on TCRS:  · Moderate (3): none  · Severe (4): none    Comments: Telephonic visit due to Covid 19 pandemic.     Patient continues to smoke 4 cigarettes per day. Pt remains on tobacco cessation medication of  150 mg Wellbutrin SR  BID .  No adverse effects noted at this time.   Pt encouraged to pick a quit day.    Discussed the importance of picking a quit day.  Reviewed coping strategies/habitual behavior/relapse prevention with patient.      Diagnosis: F17.200    Next Visit: 2 weeks

## 2020-06-22 ENCOUNTER — CLINICAL SUPPORT (OUTPATIENT)
Dept: SMOKING CESSATION | Facility: CLINIC | Age: 55
End: 2020-06-22
Payer: COMMERCIAL

## 2020-06-22 DIAGNOSIS — F17.200 NICOTINE DEPENDENCE: ICD-10-CM

## 2020-06-22 PROCEDURE — 99402 PREV MED CNSL INDIV APPRX 30: CPT | Mod: S$GLB,,,

## 2020-06-22 PROCEDURE — 99999 PR PBB SHADOW E&M-EST. PATIENT-LVL I: CPT | Mod: PBBFAC,,,

## 2020-06-22 PROCEDURE — 99402 PR PREVENT COUNSEL,INDIV,30 MIN: ICD-10-PCS | Mod: S$GLB,,,

## 2020-06-22 PROCEDURE — 99999 PR PBB SHADOW E&M-EST. PATIENT-LVL I: ICD-10-PCS | Mod: PBBFAC,,,

## 2020-06-22 RX ORDER — IBUPROFEN 200 MG
1 TABLET ORAL DAILY
Qty: 28 PATCH | Refills: 0 | Status: SHIPPED | OUTPATIENT
Start: 2020-06-22 | End: 2020-06-30 | Stop reason: SDUPTHER

## 2020-06-22 RX ORDER — BUPROPION HYDROCHLORIDE 150 MG/1
150 TABLET, EXTENDED RELEASE ORAL 2 TIMES DAILY
Qty: 60 TABLET | Refills: 0 | Status: SHIPPED | OUTPATIENT
Start: 2020-06-22 | End: 2020-06-30 | Stop reason: SDUPTHER

## 2020-06-22 NOTE — PROGRESS NOTES
Individual Follow-Up Form    6/22/2020    Quit Date: tbd    Clinical Status of Patient: Outpatient    Length of Service: 30 minutes    Continuing Medication: yes  Wellbutrin    Other Medications: none     Target Symptoms: Withdrawal and medication side effects. The following were  rated moderate (3) to severe (4) on TCRS:  · Moderate (3): crave/desire  · Severe (4): none    Comments: Telephonic visit due to Covid 19 pandemic.     Patient continues to smoke . Pt remains on tobacco cessation medication of  150 mg Wellbutrin SR BID, today added 21 mg nicotine patch QD  No adverse effects noted at this time.  Pt encouraged to pick a quit day.  Reviewed coping strategies/habitual behavior/relapse prevention with patient.      Diagnosis: F17.200    Next Visit: 1 week

## 2020-06-25 ENCOUNTER — DOCUMENTATION ONLY (OUTPATIENT)
Dept: HEMATOLOGY/ONCOLOGY | Facility: CLINIC | Age: 55
End: 2020-06-25

## 2020-06-29 ENCOUNTER — DOCUMENTATION ONLY (OUTPATIENT)
Dept: HEMATOLOGY/ONCOLOGY | Facility: CLINIC | Age: 55
End: 2020-06-29

## 2020-06-29 RX ORDER — OXYCODONE AND ACETAMINOPHEN 7.5; 325 MG/1; MG/1
1 TABLET ORAL EVERY 4 HOURS PRN
Qty: 30 TABLET | Refills: 0 | Status: SHIPPED | OUTPATIENT
Start: 2020-06-29 | End: 2020-07-08

## 2020-06-30 ENCOUNTER — CLINICAL SUPPORT (OUTPATIENT)
Dept: SMOKING CESSATION | Facility: CLINIC | Age: 55
End: 2020-06-30
Payer: COMMERCIAL

## 2020-06-30 DIAGNOSIS — F17.200 NICOTINE DEPENDENCE: Primary | ICD-10-CM

## 2020-06-30 PROCEDURE — 99999 PR PBB SHADOW E&M-EST. PATIENT-LVL I: ICD-10-PCS | Mod: PBBFAC,,,

## 2020-06-30 PROCEDURE — 99402 PR PREVENT COUNSEL,INDIV,30 MIN: ICD-10-PCS | Mod: S$GLB,,,

## 2020-06-30 PROCEDURE — 99402 PREV MED CNSL INDIV APPRX 30: CPT | Mod: S$GLB,,,

## 2020-06-30 PROCEDURE — 99999 PR PBB SHADOW E&M-EST. PATIENT-LVL I: CPT | Mod: PBBFAC,,,

## 2020-06-30 RX ORDER — DM/P-EPHED/ACETAMINOPH/DOXYLAM 30-7.5/3
LIQUID (ML) ORAL
Qty: 144 LOZENGE | Refills: 0 | Status: SHIPPED | OUTPATIENT
Start: 2020-06-30 | End: 2020-10-14

## 2020-06-30 RX ORDER — IBUPROFEN 200 MG
1 TABLET ORAL DAILY
Qty: 28 PATCH | Refills: 0 | Status: SHIPPED | OUTPATIENT
Start: 2020-06-30 | End: 2020-07-30

## 2020-06-30 RX ORDER — BUPROPION HYDROCHLORIDE 150 MG/1
150 TABLET, EXTENDED RELEASE ORAL 2 TIMES DAILY
Qty: 60 TABLET | Refills: 0 | Status: SHIPPED | OUTPATIENT
Start: 2020-06-30 | End: 2020-09-23 | Stop reason: SDUPTHER

## 2020-06-30 NOTE — PROGRESS NOTES
Individual Follow-Up Form    6/30/2020    Quit Date: tbd    Clinical Status of Patient: Outpatient    Length of Service: 30 minutes    Continuing Medication: yes  Wellbutrin         Target Symptoms: Withdrawal and medication side effects. The following were  rated moderate (3) to severe (4) on TCRS:  · Moderate (3): crave/desire  · Severe (4): none    Comments: Telephonic visit due to Covid 19 pandemic.     Patient continues to smoke . Pt remains on tobacco cessation medication of  150 mg Wellbutrin SR.  No adverse effects noted at this time. Pt not doing well with rate reduction and wait times prior to smoking.  Pt encouraged to pick a quit day. Patient said he did not receive his patchesand wellbutrin in the mail, will reorder.   Reviewed coping strategies/habitual behavior/relapse prevention with patient.     Diagnosis: F17.200    Next Visit: 2 weeks

## 2020-06-30 NOTE — Clinical Note
Patient continues to smoke . Pt remains on tobacco cessation medication of  150 mg Wellbutrin SR.  BID   No adverse effects noted at this time. Pt not doing well with rate reduction and wait times prior to smoking.  Pt encouraged to pick a quit day. Patient said he did not receive his patches and wellbutrin  in the mail, will reorder.   Reviewed coping strategies/habitual behavior/relapse prevention with patient.

## 2020-07-02 ENCOUNTER — DOCUMENTATION ONLY (OUTPATIENT)
Dept: HEMATOLOGY/ONCOLOGY | Facility: CLINIC | Age: 55
End: 2020-07-02

## 2020-07-02 ENCOUNTER — OFFICE VISIT (OUTPATIENT)
Dept: HEMATOLOGY/ONCOLOGY | Facility: CLINIC | Age: 55
End: 2020-07-02
Payer: MEDICAID

## 2020-07-02 ENCOUNTER — TELEPHONE (OUTPATIENT)
Dept: FAMILY MEDICINE | Facility: CLINIC | Age: 55
End: 2020-07-02

## 2020-07-02 ENCOUNTER — INFUSION (OUTPATIENT)
Dept: INFUSION THERAPY | Facility: HOSPITAL | Age: 55
End: 2020-07-02
Attending: INTERNAL MEDICINE
Payer: MEDICAID

## 2020-07-02 VITALS
HEIGHT: 69 IN | OXYGEN SATURATION: 99 % | BODY MASS INDEX: 25.53 KG/M2 | TEMPERATURE: 98 F | HEART RATE: 103 BPM | SYSTOLIC BLOOD PRESSURE: 138 MMHG | DIASTOLIC BLOOD PRESSURE: 84 MMHG | WEIGHT: 172.38 LBS

## 2020-07-02 DIAGNOSIS — I10 ESSENTIAL HYPERTENSION: ICD-10-CM

## 2020-07-02 DIAGNOSIS — C34.91 ADENOCARCINOMA, LUNG, RIGHT: Primary | ICD-10-CM

## 2020-07-02 DIAGNOSIS — T40.2X5A THERAPEUTIC OPIOID INDUCED CONSTIPATION: ICD-10-CM

## 2020-07-02 DIAGNOSIS — E11.9 TYPE 2 DIABETES MELLITUS WITHOUT COMPLICATION, WITHOUT LONG-TERM CURRENT USE OF INSULIN: ICD-10-CM

## 2020-07-02 DIAGNOSIS — K59.03 THERAPEUTIC OPIOID INDUCED CONSTIPATION: ICD-10-CM

## 2020-07-02 DIAGNOSIS — R63.0 ANOREXIA: ICD-10-CM

## 2020-07-02 DIAGNOSIS — B37.0 THRUSH: ICD-10-CM

## 2020-07-02 DIAGNOSIS — R05.9 COUGH: ICD-10-CM

## 2020-07-02 DIAGNOSIS — C34.91 ADENOCARCINOMA, LUNG, RIGHT: ICD-10-CM

## 2020-07-02 DIAGNOSIS — G89.3 CANCER RELATED PAIN: ICD-10-CM

## 2020-07-02 DIAGNOSIS — Z86.73 HISTORY OF CVA (CEREBROVASCULAR ACCIDENT): ICD-10-CM

## 2020-07-02 DIAGNOSIS — I25.10 CORONARY ARTERY DISEASE, ANGINA PRESENCE UNSPECIFIED, UNSPECIFIED VESSEL OR LESION TYPE, UNSPECIFIED WHETHER NATIVE OR TRANSPLANTED HEART: ICD-10-CM

## 2020-07-02 PROCEDURE — 96413 CHEMO IV INFUSION 1 HR: CPT

## 2020-07-02 PROCEDURE — A4216 STERILE WATER/SALINE, 10 ML: HCPCS | Performed by: INTERNAL MEDICINE

## 2020-07-02 PROCEDURE — 63600175 PHARM REV CODE 636 W HCPCS: Performed by: INTERNAL MEDICINE

## 2020-07-02 PROCEDURE — 99999 PR PBB SHADOW E&M-EST. PATIENT-LVL IV: CPT | Mod: PBBFAC,,, | Performed by: INTERNAL MEDICINE

## 2020-07-02 PROCEDURE — 96415 CHEMO IV INFUSION ADDL HR: CPT

## 2020-07-02 PROCEDURE — 96375 TX/PRO/DX INJ NEW DRUG ADDON: CPT

## 2020-07-02 PROCEDURE — 2024F 7 FLD RTA PHOTO EVC RTNOPTHY: CPT | Mod: ,,, | Performed by: INTERNAL MEDICINE

## 2020-07-02 PROCEDURE — 96411 CHEMO IV PUSH ADDL DRUG: CPT

## 2020-07-02 PROCEDURE — 99214 PR OFFICE/OUTPT VISIT, EST, LEVL IV, 30-39 MIN: ICD-10-PCS | Mod: S$PBB,,, | Performed by: INTERNAL MEDICINE

## 2020-07-02 PROCEDURE — 96372 THER/PROPH/DIAG INJ SC/IM: CPT | Mod: 59

## 2020-07-02 PROCEDURE — 96367 TX/PROPH/DG ADDL SEQ IV INF: CPT

## 2020-07-02 PROCEDURE — 99999 PR PBB SHADOW E&M-EST. PATIENT-LVL IV: ICD-10-PCS | Mod: PBBFAC,,, | Performed by: INTERNAL MEDICINE

## 2020-07-02 PROCEDURE — 2024F PR 7 FIELD PHOTOS WITH INTERP/ REVIEW: ICD-10-PCS | Mod: ,,, | Performed by: INTERNAL MEDICINE

## 2020-07-02 PROCEDURE — 25000003 PHARM REV CODE 250: Performed by: INTERNAL MEDICINE

## 2020-07-02 PROCEDURE — 99214 OFFICE O/P EST MOD 30 MIN: CPT | Mod: PBBFAC,25 | Performed by: INTERNAL MEDICINE

## 2020-07-02 PROCEDURE — 99214 OFFICE O/P EST MOD 30 MIN: CPT | Mod: S$PBB,,, | Performed by: INTERNAL MEDICINE

## 2020-07-02 RX ORDER — HEPARIN 100 UNIT/ML
500 SYRINGE INTRAVENOUS
Status: CANCELLED | OUTPATIENT
Start: 2020-07-02

## 2020-07-02 RX ORDER — CYANOCOBALAMIN 1000 UG/ML
1000 INJECTION, SOLUTION INTRAMUSCULAR; SUBCUTANEOUS
Status: CANCELLED | OUTPATIENT
Start: 2020-07-02

## 2020-07-02 RX ORDER — HEPARIN 100 UNIT/ML
500 SYRINGE INTRAVENOUS
Status: DISCONTINUED | OUTPATIENT
Start: 2020-07-02 | End: 2020-07-02 | Stop reason: HOSPADM

## 2020-07-02 RX ORDER — MORPHINE SULFATE 60 MG/1
60 TABLET, FILM COATED, EXTENDED RELEASE ORAL EVERY 8 HOURS
Qty: 90 TABLET | Refills: 0 | Status: SHIPPED | OUTPATIENT
Start: 2020-07-02 | End: 2020-07-06 | Stop reason: SDUPTHER

## 2020-07-02 RX ORDER — MORPHINE SULFATE 60 MG/1
60 TABLET, FILM COATED, EXTENDED RELEASE ORAL EVERY 8 HOURS
Qty: 90 TABLET | Refills: 0 | Status: CANCELLED | OUTPATIENT
Start: 2020-07-02 | End: 2021-07-02

## 2020-07-02 RX ORDER — SODIUM CHLORIDE 0.9 % (FLUSH) 0.9 %
10 SYRINGE (ML) INJECTION
Status: CANCELLED | OUTPATIENT
Start: 2020-07-02

## 2020-07-02 RX ORDER — CYPROHEPTADINE HYDROCHLORIDE 4 MG/1
4 TABLET ORAL 4 TIMES DAILY
Qty: 120 TABLET | Refills: 0 | Status: SHIPPED | OUTPATIENT
Start: 2020-07-02 | End: 2020-12-02 | Stop reason: SDUPTHER

## 2020-07-02 RX ORDER — NYSTATIN 100000 [USP'U]/ML
4 SUSPENSION ORAL
Qty: 160 ML | Refills: 0 | Status: SHIPPED | OUTPATIENT
Start: 2020-07-02 | End: 2020-07-21 | Stop reason: CLARIF

## 2020-07-02 RX ORDER — SODIUM CHLORIDE 0.9 % (FLUSH) 0.9 %
10 SYRINGE (ML) INJECTION
Status: DISCONTINUED | OUTPATIENT
Start: 2020-07-02 | End: 2020-07-02 | Stop reason: HOSPADM

## 2020-07-02 RX ORDER — CYANOCOBALAMIN 1000 UG/ML
1000 INJECTION, SOLUTION INTRAMUSCULAR; SUBCUTANEOUS
Status: COMPLETED | OUTPATIENT
Start: 2020-07-02 | End: 2020-07-02

## 2020-07-02 RX ADMIN — SODIUM CHLORIDE 200 MG: 9 INJECTION, SOLUTION INTRAVENOUS at 12:07

## 2020-07-02 RX ADMIN — APREPITANT 130 MG: 130 INJECTION, EMULSION INTRAVENOUS at 01:07

## 2020-07-02 RX ADMIN — HEPARIN 500 UNITS: 100 SYRINGE at 02:07

## 2020-07-02 RX ADMIN — Medication 10 ML: at 02:07

## 2020-07-02 RX ADMIN — CYANOCOBALAMIN 1000 MCG: 1000 INJECTION, SOLUTION INTRAMUSCULAR at 02:07

## 2020-07-02 RX ADMIN — SODIUM CHLORIDE 975 MG: 9 INJECTION, SOLUTION INTRAVENOUS at 01:07

## 2020-07-02 RX ADMIN — CARBOPLATIN 630 MG: 10 INJECTION, SOLUTION INTRAVENOUS at 01:07

## 2020-07-02 RX ADMIN — DEXAMETHASONE SODIUM PHOSPHATE: 10 INJECTION, SOLUTION INTRAMUSCULAR; INTRAVENOUS at 12:07

## 2020-07-02 NOTE — Clinical Note
Follow Up - 3 weeks with labs CBC, CMP, TSH, Mg, chemo and clinic appt with Dr Avendaño  with labs PET/CT and clinic appt with me on 7/23/20 prior to cycle 4.

## 2020-07-02 NOTE — TELEPHONE ENCOUNTER
----- Message from Leona Ponce sent at 7/2/2020 12:52 PM CDT -----  Regarding: Health Concerns  Contact: Annabelle  Type: Patient Call Back    Who called:Luis Felipe    What is the request in detail:Annabelle called to discuss health concerns with office. Please call to advise    Can the clinic reply by [facility]?    Would the patient rather a call back or a response via [facility]? Call    Best call back number:[phone]

## 2020-07-02 NOTE — PLAN OF CARE
Pt arrived to unit. VSS. Pt afebrile. Dr. Hope notified of lab results. Will proceed with chemo. No reported side effects. Appetite is decreased, periactin script sent in today. Pt has white patches in his mouth, will get nystatin script filled today too. Tolerated premeds, B12 and Keytruda, Alimta and Carboplatin. No reactions noted. AVS given to pt. Pt ambulated off unit. No distress noted.

## 2020-07-02 NOTE — PROGRESS NOTES
PATIENT: Luis Felipe Hawk  MRN: [id number]  DATE: 7/2/2020      Diagnosis:   1. Adenocarcinoma, lung, right    2. Cancer related pain    3. Type 2 diabetes mellitus without complication, without long-term current use of insulin    4. Thrush    5. Therapeutic opioid induced constipation    6. Anorexia    7. Coronary artery disease, angina presence unspecified, unspecified vessel or lesion type, unspecified whether native or transplanted heart    8. Essential hypertension    9. Cough    10. History of CVA (cerebrovascular accident)        Chief Complaint: chemo clearance    Oncologic History:      Oncologic History 11/21/19 CXR  3/13/20 CT Chest  4/01/20 IR biopsy  4/09/20 PET/CT  5/07/20 PORT placement    Oncologic Treatment 5/13/20 B 12 injection  5/20/20 Carboplatin/Alimta/Pembrolizumab cycle 2    Pathology 4/01/20 -  adenocarcinoma suggestive of a possible gastrointestinal origin     Initial History:   The patient was initially seen by Dr Carmona on 11/21/19 for a cough and underwent a CXR showing a soft tissue mass in the anterior segment of the right upper lobe that measures at least 4 cm in cranial-caudad extent.  Per chart review an attempt to contact MR Hawk was mad on multiple occasions in order to have a CT of the chest done.  CT of the chest was eventually done on 3/13/20 and showed enlarged right paratracheal LN measuring 2.8 cm, slightly enlarged right hilar nodes, a mass within the anterior segment of the right upper lobe measuring 3.9 x 4.7 cm abutting the right suprahilar region and obliterating the anterior segmental bronchus.  The patient underwent IR guided biopsy of the right lung on 4/01/20 showing adenocarcinoma suggestive of a possible gastrointestinal origin.  MRI of the brain on 4/09/20 showed no evidence of intracranial metastatic disease, remote small infarcts within the right superior frontal lobe and remote lacunar-type infarcts of the bilateral lentiform nuclei and right thalamus.   PET/CT on 4/09/20 showed a 6.5 x 3.7 cm pulmonary mass in the anterior segment of the right upper lobe abutting the mediastinal pleura, large right pretracheal soft tissue mass measuring 4.4 x 3.8 cm, left prevascular lymph node measuring 1.0 cm, few scattered bilateral poorly defined subcentimeter pulmonary nodules, and a right chest wall soft tissue lesion partially eroding the lateral right 3rd rib measuring 2 x 2 cm.  The patient had a PORT placed in the right chest on 5/07/20. He received his B12 injection on 5/13/20.  He also underwent US of the carotid on 5/14/20 showing 0-19% internal carotid artery stenosis on the right with a lymph node in the proximal neck lateral to the common carotid artery measuring 2.8 x 1.7 x 3.6 cm.  On the left there is 0-19% stenosis.  His PORT was placed on 5/07/20.   Subjective:    Interval History: Mr. Hawk is a 54 y.o. male with HTN, HLD, DMII, CAD who presents to follow up for NSCLC adenocarcinoma.  Since the last clinic visit the patient saw Dr Bennett with vascular neurology on 5/21/20 for CVA seen on MRI of the brain 4/09/20.  Recommendations were made for ASA, statin and BP control to prevent secondary stroke.  The patient was then see in the ER on 5/22/20 with concern for PNA and treated with Levaquin.  He then saw Nellie Herrera with palliative care on 6/03/20 for symptoms management.  MS Contin 60 mg q 8 hours, Percocet 5/325 1 tablet q 4 hours prn for breakthrough pain/cough.       Cough improved  PERRY-stable  No numbness  Appetite diminished  3 lb wt loss since last visit   Taste alterations   Episode of N/V   Mild fatigue  He continues with pain is lateral chest radiating to back and shoulders   He is requesting Rx refill of pain meds   He is requesting Rx refill of appetite stimulant     .NO labs done    Past Medical History:   Past Medical History:   Diagnosis Date    CAD (coronary artery disease) 2013    one stent placed    DMII (diabetes mellitus, type 2)      Hypertension     Lung cancer     Mass of right lung     Mass of upper lobe of right lung 3/16/2020    3/13/2020 CT chest: Right upper lobe mass obliterating the anterior segmental bronchus. This is associated with right hilar and paramediastinal adenopathy.  This is concerning for bronchogenic carcinoma.  Sampling recommended.       Past Surgical HIstory:   Past Surgical History:   Procedure Laterality Date    CV STENT      X 1    INSERTION OF TUNNELED CENTRAL VENOUS CATHETER (CVC) WITH SUBCUTANEOUS PORT N/A 5/7/2020    Procedure: INSERTION, PORT-A-CATH;  Surgeon: [facility] Provider;  Location: [facility] OR;  Service: Radiology;  Laterality: N/A;  RN PREOP 5/7/20--rapid covid done    LUNG BIOPSY N/A 4/1/2020    Procedure: BIOPSY, LUNG;  Surgeon: [facility] Diagnostic Provider;  Location: [facility] OR;  Service: Radiology;  Laterality: N/A;  730AM STARTRN PHONE PREOP 3/31/2020       Family History:   Family History   Problem Relation Age of Onset    Peripheral vascular disease Mother     Heart disease Father     No Known Problems Brother     No Known Problems Brother     No Known Problems Son     No Known Problems Son     No Known Problems Daughter     No Known Problems Daughter     No Known Problems Daughter        Social History:  reports that he has been smoking cigarettes. He has a 21.50 pack-year smoking history. He has never used smokeless tobacco. He reports current alcohol use of about 3.0 standard drinks of alcohol per week. He reports that he does not use drugs.    Allergies:  Review of patient's allergies indicates:   Allergen Reactions    Tramadol      Chest burning       Medications:  Current Outpatient Medications   Medication Sig Dispense Refill    acetaminophen (TYLENOL) 325 MG tablet Take 325 mg by mouth every 6 (six) hours as needed for Pain.      albuterol (PROVENTIL/VENTOLIN HFA) 90 mcg/actuation inhaler Inhale 2 puffs into the lungs every 6 (six) hours as needed for Wheezing.  Whichever brand covered by insurance 18 g 0    aspirin (ECOTRIN) 81 MG EC tablet Take 1 tablet (81 mg total) by mouth once daily. 30 tablet 11    atorvastatin (LIPITOR) 80 MG tablet Take 1 tablet (80 mg total) by mouth once daily. 90 tablet 3    benzonatate (TESSALON) 100 MG capsule Take 100 mg by mouth 3 (three) times daily as needed for Cough.      blood-glucose meter kit To check BG 1 times daily, to use with insurance preferred meter 1 each 0    buPROPion (WELLBUTRIN SR) 150 MG TBSR 12 hr tablet Take 1 tablet (150 mg total) by mouth 2 (two) times daily. 60 tablet 0    cyproheptadine (PERIACTIN) 4 mg tablet Take 1 tablet (4 mg total) by mouth 4 (four) times daily. 120 tablet 0    folic acid (FOLVITE) 1 MG tablet Take 1 tablet (1 mg total) by mouth once daily. 30 tablet 6    hydroCHLOROthiazide (HYDRODIURIL) 25 MG tablet TK 1 T PO ONE TIME PER DAY FOR BP 90 tablet 3    metFORMIN (GLUCOPHAGE) 500 MG tablet 1 po qAM x 1 wk; then 1 po BID x 1 wk; then 2 AM/1 PM x 1 wk; then 2 BID maintenance. Take with food 120 tablet 5    metoprolol succinate (TOPROL-XL) 25 MG 24 hr tablet Take 1 tablet (25 mg total) by mouth once daily. 90 tablet 3    morphine (MS CONTIN) 60 MG 12 hr tablet Take 1 tablet (60 mg total) by mouth every 8 (eight) hours. 90 tablet 0    nicotine (NICODERM CQ) 21 mg/24 hr Place 1 patch onto the skin once daily. 28 patch 0    nicotine polacrilex 2 MG Lozg Use 1-2 per hour in place of a cigarette. Limit to 10 a day. 144 lozenge 0    ondansetron (ZOFRAN) 4 MG tablet Take 1 tablet (4 mg total) by mouth every 6 (six) hours as needed for Nausea. 90 tablet 6    oxyCODONE-acetaminophen (PERCOCET) 7.5-325 mg per tablet Take 1 tablet by mouth every 4 (four) hours as needed for Pain. 30 tablet 0    polyethylene glycol (MIRALAX) 17 gram PwPk Take 17 g by mouth 2 (two) times daily as needed (Constipation). 30 each 6    senna-docusate 8.6-50 mg (PERICOLACE) 8.6-50 mg per tablet Take 2 tablets by mouth  "once daily. 60 tablet 6    gabapentin (NEURONTIN) 300 MG capsule Take 1 capsule (300 mg total) by mouth 3 (three) times daily. 90 capsule 6    nystatin (MYCOSTATIN) 100,000 unit/mL suspension Take 4 mLs (400,000 Units total) by mouth 4 (four) times daily with meals and nightly. for 10 days 160 mL 0     No current facility-administered medications for this visit.          Cough improved  PERRY-stable  No numbness  Appetite diminished  Taste alterations   Episode of N/V   Mild fatigue  He continues with pain is lateral chest radiating to back and shoulders   Pt did not undergo LABS     Review of Systems   Constitutional: Positive for fatigue and unexpected weight change. Negative for appetite change, chills, diaphoresis and fever.   HENT: Negative for sore throat and trouble swallowing.    Respiratory: Positive for cough and shortness of breath (PERRY).    Cardiovascular: Negative for chest pain and palpitations.   Gastrointestinal: Positive for constipation. Negative for abdominal pain, diarrhea, nausea and vomiting.   Musculoskeletal:        Pain in his lateral right chest radiating to his back.   Skin: Negative for color change and rash.   Neurological: Negative for headaches.   Hematological: Negative for adenopathy. Does not bruise/bleed easily.       ECOG Performance Status: 2   Objective:      Vitals:   Vitals:    07/02/20 0959   BP: 138/84   BP Location: Left arm   Patient Position: Sitting   BP Method: Medium (Automatic)   Pulse: 103   Temp: 97.8 °F (36.6 °C)   TempSrc: Oral   SpO2: 99%   Weight: 78.2 kg (172 lb 6.4 oz)   Height: 5' 9" (1.753 m)       Physical Exam  Constitutional:       Appearance: He is well-developed.   HENT:      Head: Normocephalic.      Mouth/Throat:      Comments: Oral thrush  Eyes:      General: No scleral icterus.     Conjunctiva/sclera: Conjunctivae normal.   Neck:      Musculoskeletal: Normal range of motion and neck supple.      Thyroid: No thyromegaly.   Cardiovascular:      Rate " and Rhythm: Normal rate and regular rhythm.      Heart sounds: Normal heart sounds. No murmur.   Pulmonary:      Effort: Pulmonary effort is normal.      Breath sounds: Normal breath sounds. No wheezing or rales.   Abdominal:      General: Bowel sounds are normal. There is no distension.      Palpations: Abdomen is soft. There is no mass.      Tenderness: There is no abdominal tenderness. There is no guarding or rebound.   Musculoskeletal: Normal range of motion.   Skin:     Findings: No erythema or rash.   Neurological:      Mental Status: He is alert and oriented to person, place, and time.      Cranial Nerves: No cranial nerve deficit.         Laboratory Data:  No visits with results within 1 Week(s) from this visit.   Latest known visit with results is:   Lab Visit on 06/11/2020   Component Date Value Ref Range Status    WBC 06/11/2020 7.28  3.90 - 12.70 K/uL Final    RBC 06/11/2020 4.44* 4.60 - 6.20 M/uL Final    Hemoglobin 06/11/2020 11.7* 14.0 - 18.0 g/dL Final    Hematocrit 06/11/2020 37.0* 40.0 - 54.0 % Final    Mean Corpuscular Volume 06/11/2020 83  82 - 98 fL Final    Mean Corpuscular Hemoglobin 06/11/2020 26.4* 27.0 - 31.0 pg Final    Mean Corpuscular Hemoglobin Conc 06/11/2020 31.6* 32.0 - 36.0 g/dL Final    RDW 06/11/2020 15.6* 11.5 - 14.5 % Final    Platelets 06/11/2020 333  150 - 350 K/uL Final    MPV 06/11/2020 8.8* 9.2 - 12.9 fL Final    Immature Granulocytes 06/11/2020 0.1  0.0 - 0.5 % Final    Gran # (ANC) 06/11/2020 4.1  1.8 - 7.7 K/uL Final    Immature Grans (Abs) 06/11/2020 0.01  0.00 - 0.04 K/uL Final    Comment: Mild elevation in immature granulocytes is non specific and   can be seen in a variety of conditions including stress response,   acute inflammation, trauma and pregnancy. Correlation with other   laboratory and clinical findings is essential.      Lymph # 06/11/2020 2.3  1.0 - 4.8 K/uL Final    Mono # 06/11/2020 0.8  0.3 - 1.0 K/uL Final    Eos # 06/11/2020 0.0  0.0  - 0.5 K/uL Final    Baso # 06/11/2020 0.02  0.00 - 0.20 K/uL Final    nRBC 06/11/2020 0  0 /100 WBC Final    Gran% 06/11/2020 56.9  38.0 - 73.0 % Final    Lymph% 06/11/2020 31.3  18.0 - 48.0 % Final    Mono% 06/11/2020 11.0  4.0 - 15.0 % Final    Eosinophil% 06/11/2020 0.4  0.0 - 8.0 % Final    Basophil% 06/11/2020 0.3  0.0 - 1.9 % Final    Differential Method 06/11/2020 Automated   Final    Sodium 06/11/2020 137  136 - 145 mmol/L Final    Potassium 06/11/2020 4.0  3.5 - 5.1 mmol/L Final    Chloride 06/11/2020 104  95 - 110 mmol/L Final    CO2 06/11/2020 21* 23 - 29 mmol/L Final    Glucose 06/11/2020 144* 70 - 110 mg/dL Final    BUN, Bld 06/11/2020 11  6 - 20 mg/dL Final    Creatinine 06/11/2020 0.8  0.5 - 1.4 mg/dL Final    Calcium 06/11/2020 9.5  8.7 - 10.5 mg/dL Final    Total Protein 06/11/2020 7.4  6.0 - 8.4 g/dL Final    Albumin 06/11/2020 2.9* 3.5 - 5.2 g/dL Final    Total Bilirubin 06/11/2020 0.3  0.1 - 1.0 mg/dL Final    Comment: For infants and newborns, interpretation of results should be based  on gestational age, weight and in agreement with clinical  observations.  Premature Infant recommended reference ranges:  Up to 24 hours.............<8.0 mg/dL  Up to 48 hours............<12.0 mg/dL  3-5 days..................<15.0 mg/dL  6-29 days.................<15.0 mg/dL      Alkaline Phosphatase 06/11/2020 112  55 - 135 U/L Final    AST 06/11/2020 13  10 - 40 U/L Final    ALT 06/11/2020 12  10 - 44 U/L Final    Anion Gap 06/11/2020 12  8 - 16 mmol/L Final    eGFR if African American 06/11/2020 >60  >60 mL/min/1.73 m^2 Final    eGFR if non African American 06/11/2020 >60  >60 mL/min/1.73 m^2 Final    Comment: Calculation used to obtain the estimated glomerular filtration  rate (eGFR) is the CKD-EPI equation.       TSH 06/11/2020 1.026  0.400 - 4.000 uIU/mL Final    Magnesium 06/11/2020 1.7  1.6 - 2.6 mg/dL Final         Imaging: CT Chest 3/13/20    Right paratracheal and large node  measuring 2.8 cm, series 2, image 31.  No subcarinal or left hilar nodes.  There are slightly enlarged right hilar nodes.  The thoracic aorta is of normal caliber, there is no abnormal atherosclerotic plaque.  No pericardial effusion.  Mosaic attenuation bilaterally suggestive of underlying inflammatory small airway disease.  There is a mass within the anterior segment of the right upper lobe measuring 3.9 x 4.7 cm series 4, image 220.  The pulmonary vessels appear to be patent.    The mass abuts the right suprahilar region.  It appears to obliterate the anterior segmental bronchus.  It abuts the right paramediastinal region.  No pleural effusion.  No pneumothorax.  The axillary regions appear normal.    The upper abdominal organs demonstrate no abnormalities, the adrenal glands appear normal.  The osseous structures demonstrate no osseous lesions.    PET/CT 4/09/20    Head and neck:    -Right supraclavicular lymph node measures 1.5 cm short axis (image 33) with SUV max 12.  No additional hypermetabolic lesions in the head or neck.    Thorax:    Large right upper lobe pulmonary mass is present with several enlarged hypermetabolic mediastinal lymph nodes.  Index lesions are as follows:    - 6.5 x 3.7 cm (image 61) pulmonary mass in the anterior segment of the right upper lobe abutting the mediastinal pleura with hypermetabolic activity and SUV max of 8.9.    -Large right pretracheal soft tissue mass (likely conglomeration of lymph nodes) measures 4.4 x 3.8 cm (image 46) with SUV max 13.    -Left prevascular lymph node measures 1.0 cm short axis (image 50) with SUV max 11.    There are a few scattered bilateral poorly defined subcentimeter pulmonary nodules that are too small for detection by PET.  Persistent bibasilar pulmonary mosaic attenuation, suggestive of underlying inflammatory small airways disease.    Abdomen and pelvis:    No abnormal foci of radiotracer uptake concerning for  malignancy.    Incidental:    Scattered calcification throughout the abdominal aorta and more prominently within the bilateral renal arteries.    Osseous structures:    Right chest wall soft tissue lesion partially eroding the lateral right 3rd rib measures 2 x 2 cm (image 49) with SUV max 21.    MRI Brain 4/09/20    Intracranial compartment:    Brain parenchyma demonstrates mild generalized cerebral volume loss. Scattered punctate T2/FLAIR hyperintensities are present throughout the supratentorial white matter suggestive of mild chronic microvascular ischemic change.  There are 2 small regions of encephalomalacia within the right superior frontal lobe compatible with remote infarcts.  Small cystic-appearing spaces within the bilateral lentiform nuclei and right thalamus likely represent small lacunar type infarcts.  There is a small developmental venous anomaly within the left periventricular posterior temporal lobe.  No evidence of acute infarction.  No parenchymal hemorrhage, mass lesion, or edema.  No abnormal enhancement.  No mass effect or midline shift.    No evidence of hydrocephalus.    No extra-axial blood products or fluid collections.    Skull base T2 vascular flow voids are preserved.    Skull/extracranial contents (limited evaluation): Bone marrow signal intensity is normal.          Results for JOYNER MERA (MRN [id number]) as of 7/2/2020 10:06   Ref. Range 6/11/2020 10:12   TSH Latest Ref Range: 0.400 - 4.000 uIU/mL 1.026         Assessment:       1. Adenocarcinoma, lung, right    2. Cancer related pain    3. Type 2 diabetes mellitus without complication, without long-term current use of insulin    4. Thrush    5. Therapeutic opioid induced constipation    6. Anorexia    7. Coronary artery disease, angina presence unspecified, unspecified vessel or lesion type, unspecified whether native or transplanted heart    8. Essential hypertension    9. Cough    10. History of CVA (cerebrovascular accident)            Plan:     NSCLC - biopsy of the right lung on 4/01/20 showed adenocarcinoma  -PET/CT on 4/09/20 showed 6.5 x 3.7 cm pulmonary mass in the anterior segment of the right upper lobe abutting the mediastinal pleura, large right pretracheal soft tissue mass measuring 4.4 x 3.8 cm, left prevascular lymph node measuring 1.0 cm, few scattered bilateral poorly defined subcentimeter pulmonary nodules, and a right chest wall soft tissue lesion partially eroding the lateral right 3rd rib measuring 2 x 2 cm  -Clinically the patient is behaving like a primary lung cancer  -I previously spoke with Dr Fregoso whom stated the pathologic pattern could be seen in an enteric lung primary  -Pt tested negative for PD-L1, ROS-1 and ALK.  -[facility] showed no mutations in EGFR and BRAF.  -PORT placed in right chest on 5/07/20  -Pt received B12 injection on 5/13/20  -Proceed with C3 with Carboplatin, Pemetrexed and Pembrolizumab ( pending lab parameters_  ( pt did not have labs done)   Await labs     Cough - pt with a cough related to his NSCLC.  -Pt previously took  tylenol with codeine.    Cancer Related pain - Pt taking 60 Mg MS Contin every 8 hours.  -Will continue gabapentin and percocet  -May consider radiation to the right chest wall in the future if pain remains difficult to control.  Rx refill for MS contin 60mg po q8hr to [facility] provided    Opioid Induced Constipation - Pt has not been taking pericolace or miralax  -Pt instructed to start taking pericolace daily and miralax as needed    HTN - pt on HCTZ and metoprolol  -Will monitor    HLD - pt on lipitor  -PCP managing    DMII - pt's A1C is 10.1 on 3/13/20  -Pt on metformin  -Will monitor    Thrush  Nystatin Rx provided    Anorexia  Rx refill for appetite stimulant provided to pt     CAD -  Pt on statin, ASA, metoprolol    CVA - recent MRI of the brain on 4/09/20 showed old infarcts in the right superior frontal lobe and remote lacunar-type infarcts of the  bilateral lentiform nuclei and right thalamus  -Pt already on ASA and statin  -PT saw Dr Bennett on 5/21/20 who recommended continued ASA, statin and BP control     Follow Up - 3 weeks with labs CBC, CMP, TSH, Mg, chemo and clinic appt with Dr Avendaño  with labs PET/CT and clinic appt with Dr. Avendaño  on 7/23/20 prior to cycle 4    Advance Care Planning     Power of   Dr. Avendaño previously  initiated the process of advance care planning today and explained the importance of this process to the patient.  I introduced the concept of advance directives to the patient, as well. Then the patient received detailed information about the importance of designating a Health Care Power of  (HCPOA). He was also instructed to communicate with this person about their wishes for future healthcare, should he become sick and lose decision-making capacity. The patient has not previously appointed a HCPOA. After our discussion, the patient has decided to complete a HCPOA and will bring the form completed to his next clinic appointment.      Follow Up - 3 weeks with labs CBC, CMP, TSH, Mg, chemo and clinic appt with Dr Avendaño  with labs PET/CT and clinic appt with me on 7/23/20 prior to cycle 4.             This service was not originating from a related E/M service provided within the previous 7 days nor will  to an E/M service or procedure within the next 24 hours or my soonest available appointment.  Prevailing standard of care was able to be met in this audio-only visit.

## 2020-07-02 NOTE — TELEPHONE ENCOUNTER
Spoke with Pt family member jenn  States to disreguard message.said pt has thrush in his mouth and already has an antibiotics for it already.

## 2020-07-04 ENCOUNTER — HOSPITAL ENCOUNTER (EMERGENCY)
Facility: HOSPITAL | Age: 55
Discharge: HOME | End: 2020-07-04
Attending: EMERGENCY MEDICINE
Payer: MEDICAID

## 2020-07-04 ENCOUNTER — NURSE TRIAGE (OUTPATIENT)
Dept: ADMINISTRATIVE | Facility: CLINIC | Age: 55
End: 2020-07-04

## 2020-07-04 VITALS
RESPIRATION RATE: 20 BRPM | DIASTOLIC BLOOD PRESSURE: 81 MMHG | HEART RATE: 73 BPM | WEIGHT: 172 LBS | SYSTOLIC BLOOD PRESSURE: 182 MMHG | BODY MASS INDEX: 25.48 KG/M2 | TEMPERATURE: 99 F | OXYGEN SATURATION: 95 % | HEIGHT: 69 IN

## 2020-07-04 DIAGNOSIS — R53.83 FATIGUE, UNSPECIFIED TYPE: ICD-10-CM

## 2020-07-04 DIAGNOSIS — J18.9 PNEUMONIA OF RIGHT LUNG DUE TO INFECTIOUS ORGANISM, UNSPECIFIED PART OF LUNG: Primary | ICD-10-CM

## 2020-07-04 DIAGNOSIS — R61 DIAPHORESIS: ICD-10-CM

## 2020-07-04 LAB
ALBUMIN SERPL BCP-MCNC: 3 G/DL (ref 3.5–5.2)
ALP SERPL-CCNC: 106 U/L (ref 55–135)
ALT SERPL W/O P-5'-P-CCNC: 10 U/L (ref 10–44)
ANION GAP SERPL CALC-SCNC: 14 MMOL/L (ref 8–16)
AST SERPL-CCNC: 17 U/L (ref 10–40)
BASOPHILS # BLD AUTO: 0.01 K/UL (ref 0–0.2)
BASOPHILS NFR BLD: 0.1 % (ref 0–1.9)
BILIRUB SERPL-MCNC: 0.3 MG/DL (ref 0.1–1)
BILIRUB UR QL STRIP: NEGATIVE
BUN SERPL-MCNC: 17 MG/DL (ref 6–20)
CALCIUM SERPL-MCNC: 9.5 MG/DL (ref 8.7–10.5)
CHLORIDE SERPL-SCNC: 108 MMOL/L (ref 95–110)
CLARITY UR: CLEAR
CO2 SERPL-SCNC: 22 MMOL/L (ref 23–29)
COLOR UR: YELLOW
CREAT SERPL-MCNC: 0.7 MG/DL (ref 0.5–1.4)
DIFFERENTIAL METHOD: ABNORMAL
EOSINOPHIL # BLD AUTO: 0 K/UL (ref 0–0.5)
EOSINOPHIL NFR BLD: 0 % (ref 0–8)
ERYTHROCYTE [DISTWIDTH] IN BLOOD BY AUTOMATED COUNT: 18.2 % (ref 11.5–14.5)
EST. GFR  (AFRICAN AMERICAN): >60 ML/MIN/1.73 M^2
EST. GFR  (NON AFRICAN AMERICAN): >60 ML/MIN/1.73 M^2
GLUCOSE SERPL-MCNC: 123 MG/DL (ref 70–110)
GLUCOSE UR QL STRIP: NEGATIVE
HCT VFR BLD AUTO: 34.7 % (ref 40–54)
HGB BLD-MCNC: 11.1 G/DL (ref 14–18)
HGB UR QL STRIP: NEGATIVE
IMM GRANULOCYTES # BLD AUTO: 0.02 K/UL (ref 0–0.04)
IMM GRANULOCYTES NFR BLD AUTO: 0.2 % (ref 0–0.5)
KETONES UR QL STRIP: NEGATIVE
LACTATE SERPL-SCNC: 2 MMOL/L (ref 0.5–2.2)
LEUKOCYTE ESTERASE UR QL STRIP: NEGATIVE
LYMPHOCYTES # BLD AUTO: 2.3 K/UL (ref 1–4.8)
LYMPHOCYTES NFR BLD: 24.8 % (ref 18–48)
MAGNESIUM SERPL-MCNC: 1.6 MG/DL (ref 1.6–2.6)
MCH RBC QN AUTO: 27.2 PG (ref 27–31)
MCHC RBC AUTO-ENTMCNC: 32 G/DL (ref 32–36)
MCV RBC AUTO: 85 FL (ref 82–98)
MONOCYTES # BLD AUTO: 0.9 K/UL (ref 0.3–1)
MONOCYTES NFR BLD: 9.6 % (ref 4–15)
NEUTROPHILS # BLD AUTO: 6 K/UL (ref 1.8–7.7)
NEUTROPHILS NFR BLD: 65.3 % (ref 38–73)
NITRITE UR QL STRIP: NEGATIVE
NRBC BLD-RTO: 0 /100 WBC
PH UR STRIP: 5 [PH] (ref 5–8)
PLATELET # BLD AUTO: 321 K/UL (ref 150–350)
PMV BLD AUTO: 9.5 FL (ref 9.2–12.9)
POTASSIUM SERPL-SCNC: 3.5 MMOL/L (ref 3.5–5.1)
PROT SERPL-MCNC: 7.1 G/DL (ref 6–8.4)
PROT UR QL STRIP: NEGATIVE
RBC # BLD AUTO: 4.08 M/UL (ref 4.6–6.2)
SODIUM SERPL-SCNC: 144 MMOL/L (ref 136–145)
SP GR UR STRIP: 1.02 (ref 1–1.03)
TROPONIN I SERPL DL<=0.01 NG/ML-MCNC: <0.006 NG/ML (ref 0–0.03)
TSH SERPL DL<=0.005 MIU/L-ACNC: 0.84 UIU/ML (ref 0.4–4)
URN SPEC COLLECT METH UR: NORMAL
UROBILINOGEN UR STRIP-ACNC: NEGATIVE EU/DL
WBC # BLD AUTO: 9.26 K/UL (ref 3.9–12.7)

## 2020-07-04 PROCEDURE — 80053 COMPREHEN METABOLIC PANEL: CPT

## 2020-07-04 PROCEDURE — 25000003 PHARM REV CODE 250: Performed by: PHYSICIAN ASSISTANT

## 2020-07-04 PROCEDURE — U0003 INFECTIOUS AGENT DETECTION BY NUCLEIC ACID (DNA OR RNA); SEVERE ACUTE RESPIRATORY SYNDROME CORONAVIRUS 2 (SARS-COV-2) (CORONAVIRUS DISEASE [COVID-19]), AMPLIFIED PROBE TECHNIQUE, MAKING USE OF HIGH THROUGHPUT TECHNOLOGIES AS DESCRIBED BY CMS-2020-01-R: HCPCS

## 2020-07-04 PROCEDURE — 84484 ASSAY OF TROPONIN QUANT: CPT

## 2020-07-04 PROCEDURE — 84443 ASSAY THYROID STIM HORMONE: CPT

## 2020-07-04 PROCEDURE — 93005 ELECTROCARDIOGRAM TRACING: CPT

## 2020-07-04 PROCEDURE — 96365 THER/PROPH/DIAG IV INF INIT: CPT

## 2020-07-04 PROCEDURE — 81003 URINALYSIS AUTO W/O SCOPE: CPT

## 2020-07-04 PROCEDURE — 83605 ASSAY OF LACTIC ACID: CPT

## 2020-07-04 PROCEDURE — 99285 EMERGENCY DEPT VISIT HI MDM: CPT | Mod: 25

## 2020-07-04 PROCEDURE — 85025 COMPLETE CBC W/AUTO DIFF WBC: CPT

## 2020-07-04 PROCEDURE — 83735 ASSAY OF MAGNESIUM: CPT

## 2020-07-04 PROCEDURE — 93010 ELECTROCARDIOGRAM REPORT: CPT | Mod: ,,, | Performed by: INTERNAL MEDICINE

## 2020-07-04 PROCEDURE — 93010 EKG 12-LEAD: ICD-10-PCS | Mod: ,,, | Performed by: INTERNAL MEDICINE

## 2020-07-04 PROCEDURE — 63600175 PHARM REV CODE 636 W HCPCS: Performed by: PHYSICIAN ASSISTANT

## 2020-07-04 PROCEDURE — 87040 BLOOD CULTURE FOR BACTERIA: CPT

## 2020-07-04 RX ORDER — LEVOFLOXACIN 750 MG/1
750 TABLET ORAL DAILY
Qty: 7 TABLET | Refills: 0 | Status: SHIPPED | OUTPATIENT
Start: 2020-07-04 | End: 2020-07-11

## 2020-07-04 RX ORDER — BENZONATATE 200 MG/1
200 CAPSULE ORAL 3 TIMES DAILY PRN
Qty: 30 CAPSULE | Refills: 0 | Status: SHIPPED | OUTPATIENT
Start: 2020-07-04 | End: 2020-07-14

## 2020-07-04 RX ADMIN — CEFTRIAXONE 1 G: 1 INJECTION, SOLUTION INTRAVENOUS at 04:07

## 2020-07-04 RX ADMIN — SODIUM CHLORIDE 1000 ML: 0.9 INJECTION, SOLUTION INTRAVENOUS at 04:07

## 2020-07-04 NOTE — DISCHARGE INSTRUCTIONS
Follow-up with your oncologist for re-evaluation.  Take all antibiotics as prescribed, try to take with meals to limit nausea.    Please return to this ED if you begin with chest pain or shortness of breath, if fatigue and tiredness worsen, if no longer eating or drinking, if you begin with fever, if any other problems occur.

## 2020-07-04 NOTE — TELEPHONE ENCOUNTER
Reason for Disposition   Shock suspected (e.g., cold/pale/clammy skin, too weak to stand, low BP, rapid pulse)    Protocols used: [id number]    Patient had chemotherapy yesterday, he is confused, and having chills off and on. His fiance Ms. Hawk states he is sweating really bad and they don't have his meds or bp monitor at their new apt. She states yesterday he was complaining of abdominal pain. Advised her to call 911 bc he may be having a heart attack or side effect from the chemo. Ms. Hawk verbalized understanding.

## 2020-07-04 NOTE — ED PROVIDER NOTES
Encounter Date: 7/4/2020       History     Chief Complaint   Patient presents with    near syncope episode      Patient reports near syncope episode. patient reports feeling faint when attempting to have a bowel movement. patient with hx of lung cancer last dose of chemo 7/2/20. patient with port RCW     53yo M smoker with pmh CAD, NIDDM, HTN, lung CA currently undergoing chemotherapy, presents to the ED with chief complaint diaphoresis. Pt states he has been experiencing diaphoresis following chemotherapy. Girlfriend states that she became concerned because it appears to be lasting longer than it usually does. Last chemo infusion 3 days ago. Began with diaphoresis yesterday, which he states is typical following treatments; but he does feel as if it is lasting longer than usual. GF also states that he has been more sleepy than usual yesterday; pt denies feeling any more sleepy or tired than usual. He admits to chronic fatigue and chronic decreased appetite; states he is eating like normal currently. Pt denies any pain. He denies lightheadedness/dizziness. Denies syncope or near syncope. Denies SOB. No CP. Denies fever, chills, myalgias. No cough. No recent illness. No abdominal pain. He states nausea is improved compared to previous chemo treatments. No neck pain/stiffness. No headache.         Review of patient's allergies indicates:   Allergen Reactions    Tramadol      Chest burning     Past Medical History:   Diagnosis Date    CAD (coronary artery disease) 2013    one stent placed    DMII (diabetes mellitus, type 2)     Hypertension     Lung cancer     Mass of right lung     Mass of upper lobe of right lung 3/16/2020    3/13/2020 CT chest: Right upper lobe mass obliterating the anterior segmental bronchus. This is associated with right hilar and paramediastinal adenopathy.  This is concerning for bronchogenic carcinoma.  Sampling recommended.     Past Surgical History:   Procedure Laterality Date    CV  STENT      X 1    INSERTION OF TUNNELED CENTRAL VENOUS CATHETER (CVC) WITH SUBCUTANEOUS PORT N/A 5/7/2020    Procedure: INSERTION, PORT-A-CATH;  Surgeon: Dosc Diagnostic Provider;  Location: [facility] OR;  Service: Radiology;  Laterality: N/A;  RN PREOP 5/7/20--rapid covid done    LUNG BIOPSY N/A 4/1/2020    Procedure: BIOPSY, LUNG;  Surgeon: Dos Diagnostic Provider;  Location: [facility] OR;  Service: Radiology;  Laterality: N/A;  730AM STARTRN PHONE PREOP 3/31/2020     Family History   Problem Relation Age of Onset    Peripheral vascular disease Mother     Heart disease Father     No Known Problems Brother     No Known Problems Brother     No Known Problems Son     No Known Problems Son     No Known Problems Daughter     No Known Problems Daughter     No Known Problems Daughter      Social History     Tobacco Use    Smoking status: Current Every Day Smoker     Packs/day: 0.50     Years: 43.00     Pack years: 21.50     Types: Cigarettes    Smokeless tobacco: Never Used   Substance Use Topics    Alcohol use: Yes     Alcohol/week: 3.0 standard drinks     Types: 3 Shots of liquor per week     Frequency: 2-4 times a month    Drug use: No     Review of Systems   Constitutional: Positive for diaphoresis. Negative for chills and fever.   Eyes: Negative for visual disturbance.   Respiratory: Negative for cough and shortness of breath.    Cardiovascular: Negative for chest pain, palpitations and leg swelling.   Gastrointestinal: Negative for abdominal pain, nausea and vomiting.   Genitourinary: Negative for dysuria, flank pain, frequency, penile pain and testicular pain.   Musculoskeletal: Negative for arthralgias, back pain, myalgias, neck pain and neck stiffness.   Skin: Negative for rash.   Neurological: Negative for dizziness, seizures, syncope, light-headedness and numbness.       Physical Exam     Initial Vitals [07/04/20 0121]   BP Pulse Resp Temp SpO2   (!) 140/68 100 20 99.3 °F (37.4 °C) 98 %      MAP       --          Physical Exam    Nursing note and vitals reviewed.  Constitutional: He appears well-developed and well-nourished. He is not diaphoretic. No distress.   Overall well-appearing and nontoxic.  Resting comfortably on exam table.  Infrequent nonproductive cough during exam.   HENT:   Head: Normocephalic and atraumatic.   Mouth/Throat: Oropharynx is clear and moist.   Eyes: EOM are normal.   Neck: Neck supple.   Cardiovascular: Normal rate, regular rhythm, normal heart sounds and intact distal pulses.   No murmur heard.  Pulmonary/Chest: No respiratory distress. He exhibits no tenderness.   There is a very faint inspiratory wheeze throughout lung fields.  No hypoxia on room air.  No tachypnea.  No accessory muscle use.    There is a very clean port incision to the right upper chest, no tenderness to the port.   Musculoskeletal: Normal range of motion.      Comments: Clubbing to fingers   Neurological: He is alert and oriented to person, place, and time.   Skin: Skin is warm.   Psychiatric: He has a normal mood and affect. Thought content normal.         ED Course   Procedures  Labs Reviewed   CBC W/ AUTO DIFFERENTIAL - Abnormal; Notable for the following components:       Result Value    RBC 4.08 (*)     Hemoglobin 11.1 (*)     Hematocrit 34.7 (*)     RDW 18.2 (*)     All other components within normal limits   COMPREHENSIVE METABOLIC PANEL - Abnormal; Notable for the following components:    CO2 22 (*)     Glucose 123 (*)     Albumin 3.0 (*)     All other components within normal limits   CULTURE, BLOOD   CULTURE, BLOOD   TROPONIN I   MAGNESIUM   LACTIC ACID, PLASMA   TSH   URINALYSIS, REFLEX TO URINE CULTURE    Narrative:     Specimen Source->Urine   SARS-COV-2 (COVID-19) QUALITATIVE PCR     EKG Readings: (Independently Interpreted)   Normal sinus rhythm, ventricular rate 96 beats per minute.  No evidence of acute ischemia, arrhythmia, or heart block slightly prolonged QT, similar previous.  Grossly similar to  previous dated 05/22/2020.       Imaging Results          X-Ray Chest AP Portable (Final result)  Result time 07/04/20 02:59:18    Final result by Jemima Navarrete MD (07/04/20 02:59:18)                 Impression:      1. Patchy right basilar opacities possibly relating to underlying infectious process in the appropriate clinical setting.  Clinical correlation advised.  2. Redemonstration of right perihilar pulmonary mass and osseous destructive lesion involving the right 3rd and 4th ribs.      Electronically signed by: Jemima Navarrete MD  Date:    07/04/2020  Time:    02:59             Narrative:    EXAMINATION:  XR CHEST AP PORTABLE    CLINICAL HISTORY:  near syncope;    TECHNIQUE:  Single frontal view of the chest was performed.    COMPARISON:  05/22/2020    FINDINGS:  Cardiac monitoring leads overlie the chest.  There is a stably positioned right chest port in place with catheter tip projecting over the SVC.  There is redemonstration of a right perihilar pulmonary mass, similar to prior examinations.  There is osseous destruction 3rd and 4th ribs similar to prior studies.  There are a few patchy right basilar opacities.  No significant volume of pleural fluid or pneumothorax.                                 Medical Decision Making:   Differential Diagnosis:   Pneumonia, fatigue, myocardial ischemia, arrhythmia, dehydration  Clinical Tests:   Lab Tests: Ordered and Reviewed  Radiological Study: Ordered and Reviewed  Medical Tests: Ordered and Reviewed  ED Management:  54-year-old male undergoing chemotherapy for adenocarcinoma of lung, was urged to present to ED by his girlfriend due to concern for prolonged episode of diaphoresis, sleepiness/tiredness following chemotherapy on Thursday of this week.  He typically does have an episode of diaphoresis the day following his chemotherapy infusions, however it lasted all day Friday and into the evening which is atypical.  She also states that he is more tired and sleepy  than usual.  No syncope or near-syncope.  No complaints of pain. No fever,c chills, myalgias. No neck pain/stiffness.    Patient denies any new cough, however he displays an infrequent, nonproductive cough during my evaluation.  There is faint expiratory wheeze to bilateral lung zones.  Is likely chronic.  No hypoxia on room air.  No tachypnea.  No accessory muscle use.  Denies chest pain or shortness of breath.  No abdominal pain.  No nausea vomiting.  No recent change in appetite or intake.  No urinary complaints.    Chest x-ray with possible right-sided pneumonia.  Lactic within normal limits.  No leukocytosis.  Electrolytes grossly unremarkable, stable.  There is no hypoxia.  He denies shortness of breath.  He denies chest pain.  Use for breathing.  Unremarkable.  Blood cultures pending.  Will give 1g of Rocephin, will discharge on Levaquin and have him follow up with oncologist or PCP.    He will return to this ED if any shortness of breath or chest pain, if no longer eating or drinking, if fatigue worsens.  I feel he can be safely managed as an outpatient at this time.    Urine cultured from previous visit without growth.                                 Clinical Impression:       ICD-10-CM ICD-9-CM   1. Pneumonia of right lung due to infectious organism, unspecified part of lung  J18.9 483.8   2. Diaphoresis  R61 780.8   3. Fatigue, unspecified type  R53.83 780.79         Disposition:   Disposition: Discharged  Condition: Stable     ED Disposition Condition    Discharge Stable        ED Prescriptions     Medication Sig Dispense Start Date End Date Auth. Provider    levoFLOXacin (LEVAQUIN) 750 MG tablet Take 1 tablet (750 mg total) by mouth once daily. for 7 days 7 tablet 7/4/2020 7/11/2020 Kristopher Herring PA-C    benzonatate (TESSALON) 200 MG capsule Take 1 capsule (200 mg total) by mouth 3 (three) times daily as needed for Cough. 30 capsule 7/4/2020 7/14/2020 Kristopher Herring PA-C        Follow-up  Information     Follow up With Specialties Details Why Contact Info    Iveth Hope MD Hematology and Oncology, Hematology Schedule an appointment as soon as possible for a visit  For reevaluation [address]  [address]  [facility] [address]  [phone]      [facility]  As needed, If symptoms worsen [address] [facility] [id number] [phone]                                     Kristopher Herring PA-C  07/04/20 1601

## 2020-07-05 DIAGNOSIS — G89.29 CHRONIC SHOULDER PAIN, UNSPECIFIED LATERALITY: ICD-10-CM

## 2020-07-05 DIAGNOSIS — R06.2 WHEEZING: ICD-10-CM

## 2020-07-05 DIAGNOSIS — M25.519 CHRONIC SHOULDER PAIN, UNSPECIFIED LATERALITY: ICD-10-CM

## 2020-07-05 LAB — SARS-COV-2 RNA RESP QL NAA+PROBE: NOT DETECTED

## 2020-07-05 RX ORDER — NAPROXEN 500 MG/1
TABLET ORAL
Qty: 60 TABLET | Refills: 11 | Status: SHIPPED | OUTPATIENT
Start: 2020-07-05 | End: 2020-10-14

## 2020-07-05 RX ORDER — ALBUTEROL SULFATE 90 UG/1
AEROSOL, METERED RESPIRATORY (INHALATION)
Qty: 18 G | Refills: 11 | Status: SHIPPED | OUTPATIENT
Start: 2020-07-05 | End: 2021-04-07 | Stop reason: SDUPTHER

## 2020-07-06 ENCOUNTER — DOCUMENTATION ONLY (OUTPATIENT)
Dept: HEMATOLOGY/ONCOLOGY | Facility: CLINIC | Age: 55
End: 2020-07-06

## 2020-07-06 ENCOUNTER — TELEPHONE (OUTPATIENT)
Dept: HEMATOLOGY/ONCOLOGY | Facility: CLINIC | Age: 55
End: 2020-07-06

## 2020-07-06 DIAGNOSIS — C34.91 ADENOCARCINOMA, LUNG, RIGHT: ICD-10-CM

## 2020-07-06 DIAGNOSIS — J18.9 RECURRENT PNEUMONIA: Primary | ICD-10-CM

## 2020-07-06 RX ORDER — MORPHINE SULFATE 60 MG/1
60 TABLET, FILM COATED, EXTENDED RELEASE ORAL EVERY 8 HOURS
Qty: 90 TABLET | Refills: 0 | Status: SHIPPED | OUTPATIENT
Start: 2020-07-06 | End: 2020-07-06 | Stop reason: SDUPTHER

## 2020-07-06 RX ORDER — MORPHINE SULFATE 60 MG/1
60 TABLET, FILM COATED, EXTENDED RELEASE ORAL EVERY 8 HOURS
Qty: 90 TABLET | Refills: 0 | Status: SHIPPED | OUTPATIENT
Start: 2020-07-06 | End: 2020-07-29 | Stop reason: DRUGHIGH

## 2020-07-06 NOTE — TELEPHONE ENCOUNTER
I called the patient and he stated he seems to be getting a pneumonia after every chemo treatment.  I spoke to radiology concerning this problem and Dr Phelps recommended a CT of the chest to assess.  I called the patient and discussed this with him.  The patient expressed understanding.  All questions were answered to his satisfaction.    Richar Avendaño MD  Hematology and Oncology  [facility]  Office:[phone]  Fax: [phone]

## 2020-07-06 NOTE — NURSING
On 7-4-20 significant  Other, [place], contacted call center stating patient was having confusion , chills fever, abd pain and  sweating profusely.  Instructed per call center to call ambulance and present to ER  Pt presented to ER temp 99.3  O2 Sat 98 % on room air  ,WBC 9.2   Pt denied CP,and that he has diaphoresis after each chemo but this one is lasting much longer  He denies feeling more tired than usual  but is always fatigue from the chemo   Chest X-ray indicated a patch area in Rt lung consistent with an infectious process.  He was therefore given 1 Gm of Rocephin and PO Levaquin to be taken as an OP.  Advised to return to ER as needed and to follow up with PCP & Oncologist  He agreed

## 2020-07-08 ENCOUNTER — CLINICAL SUPPORT (OUTPATIENT)
Dept: SMOKING CESSATION | Facility: CLINIC | Age: 55
End: 2020-07-08
Payer: COMMERCIAL

## 2020-07-08 ENCOUNTER — OFFICE VISIT (OUTPATIENT)
Dept: PALLIATIVE MEDICINE | Facility: CLINIC | Age: 55
End: 2020-07-08
Payer: MEDICAID

## 2020-07-08 DIAGNOSIS — G89.3 CANCER RELATED PAIN: ICD-10-CM

## 2020-07-08 DIAGNOSIS — F17.200 NICOTINE DEPENDENCE: Primary | ICD-10-CM

## 2020-07-08 DIAGNOSIS — C34.91 ADENOCARCINOMA, LUNG, RIGHT: ICD-10-CM

## 2020-07-08 DIAGNOSIS — M79.2 NEUROPATHIC PAIN OF SHOULDER, RIGHT: ICD-10-CM

## 2020-07-08 DIAGNOSIS — R52 PAIN MANAGEMENT: Primary | ICD-10-CM

## 2020-07-08 DIAGNOSIS — R52 PAIN MANAGEMENT: ICD-10-CM

## 2020-07-08 DIAGNOSIS — Z51.5 PALLIATIVE CARE ENCOUNTER: ICD-10-CM

## 2020-07-08 LAB
BACTERIA BLD CULT: NORMAL
BACTERIA BLD CULT: NORMAL

## 2020-07-08 PROCEDURE — 99406 PR TOBACCO USE CESSATION INTERMEDIATE 3-10 MINUTES: ICD-10-PCS | Mod: S$GLB,,,

## 2020-07-08 PROCEDURE — 99214 OFFICE O/P EST MOD 30 MIN: CPT | Mod: 95,,, | Performed by: NURSE PRACTITIONER

## 2020-07-08 PROCEDURE — 99406 BEHAV CHNG SMOKING 3-10 MIN: CPT | Mod: S$GLB,,,

## 2020-07-08 PROCEDURE — 99214 PR OFFICE/OUTPT VISIT, EST, LEVL IV, 30-39 MIN: ICD-10-PCS | Mod: 95,,, | Performed by: NURSE PRACTITIONER

## 2020-07-08 RX ORDER — OXYCODONE AND ACETAMINOPHEN 7.5; 325 MG/1; MG/1
1 TABLET ORAL EVERY 4 HOURS PRN
Qty: 30 TABLET | Refills: 0 | Status: CANCELLED | OUTPATIENT
Start: 2020-07-08 | End: 2021-07-08

## 2020-07-08 RX ORDER — IBUPROFEN 200 MG
1 TABLET ORAL DAILY
Qty: 28 PATCH | Refills: 0 | Status: CANCELLED | OUTPATIENT
Start: 2020-07-08 | End: 2020-08-07

## 2020-07-08 RX ORDER — OXYCODONE AND ACETAMINOPHEN 10; 325 MG/1; MG/1
1 TABLET ORAL EVERY 4 HOURS PRN
Qty: 90 TABLET | Refills: 0 | Status: SHIPPED | OUTPATIENT
Start: 2020-07-08 | End: 2020-07-24

## 2020-07-08 NOTE — PROGRESS NOTES
"Established Patient - Audio Only Telehealth Visit     The patient location is: home  The chief complaint leading to consultation is: continued pain   Visit type: Virtual visit with audio only (telephone)  Total time spent with patient: 35 minutes       The reason for the audio only service rather than synchronous audio and video virtual visit was related to technical difficulties or patient preference/necessity.     Each patient to whom I provide medical services by telemedicine is:  (1) informed of the relationship between the physician and patient and the respective role of any other health care provider with respect to management of the patient; and (2) notified that they may decline to receive medical services by telemedicine and may withdraw from such care at any time. Patient verbally consented to receive this service via voice-only telephone call.       HPI:      Assessment and plan:      Patient getting low on pain medication and c/o not getting enough pain relief 7/10 presently. He continues to use MS Contin 60 mg TID and just refilled it 2 days ago (90 tablets). He takes Percocet 7.5 mg 1 tablet q 4 hours and is taking on a regular basis but doesn't feel it relieves his pain enough.   He states he is only able to sleep on one side (left) due to pain and if he accidentally turns over during the night to right side it wakes him up with pain. The pain is described as achy with shooting sensation and "sometimes it feels like the skin is peeling off my arm it hurts so bad." We discussed increasing Perocet dosage one more time and if this doesn't work to increase LA Morphine.   He denies constipation, SOB, anxiety, depression, nausea. He does have problem sleeping but due to pain. Appetite fair  Patient came to ED this past weekend and diagnosed with PNA and sent home on abx. He also has developed thrush and is on antifungal and reports it is better. We discussed good oral hygiene    We also went back to our " discussion of possible XRT for pain control and his s/o Annabelle (HPOA) who is also on call states they started talking about it and it fell by the wayside. She states she feels he is continually deteriorating and getting closer to dying. She states patient is to come in this week for CT scan and discussion of possible treament plan but feels he is too weak for chemo. She wants him to consider XRT if this is still an option. I explained it would not be curative but palliative and for pain control only. He states he will consider.    We reviewed plan of increase in percocet dosage and they will  from pharmacy today. Addressed all questions and concerns to their satisfaction      This service was not originating from a related E/M service provided within the previous 7 days nor will  to an E/M service or procedure within the next 24 hours or my soonest available appointment.  Prevailing standard of care was able to be met in this audio-only visit.      Plan:    -Increase Percocet to 10/325 mg 1 tablet q 4 hours prn  If this does not improve his pain we will increase the LA Morphine but he just filled 2 days ago so he is going to see how new dosage Percocet works  -F/U appt for pain control in 1 month

## 2020-07-09 ENCOUNTER — HOSPITAL ENCOUNTER (OUTPATIENT)
Dept: RADIOLOGY | Facility: HOSPITAL | Age: 55
Discharge: HOME | End: 2020-07-09
Attending: INTERNAL MEDICINE
Payer: MEDICAID

## 2020-07-09 DIAGNOSIS — J18.9 RECURRENT PNEUMONIA: ICD-10-CM

## 2020-07-09 PROCEDURE — 71260 CT CHEST WITH CONTRAST: ICD-10-PCS | Mod: 26,,, | Performed by: RADIOLOGY

## 2020-07-09 PROCEDURE — 71260 CT THORAX DX C+: CPT | Mod: TC

## 2020-07-09 PROCEDURE — 71260 CT THORAX DX C+: CPT | Mod: 26,,, | Performed by: RADIOLOGY

## 2020-07-09 PROCEDURE — 25500020 PHARM REV CODE 255: Performed by: INTERNAL MEDICINE

## 2020-07-09 RX ADMIN — IOHEXOL 75 ML: 350 INJECTION, SOLUTION INTRAVENOUS at 01:07

## 2020-07-10 ENCOUNTER — TELEPHONE (OUTPATIENT)
Dept: HEMATOLOGY/ONCOLOGY | Facility: CLINIC | Age: 55
End: 2020-07-10

## 2020-07-10 RX ORDER — GABAPENTIN 600 MG/1
600 TABLET ORAL 3 TIMES DAILY
Qty: 30 TABLET | Refills: 2 | Status: SHIPPED | OUTPATIENT
Start: 2020-07-10 | End: 2020-07-10

## 2020-07-10 RX ORDER — GABAPENTIN 600 MG/1
600 TABLET ORAL 3 TIMES DAILY
Qty: 30 TABLET | Refills: 2 | Status: SHIPPED | OUTPATIENT
Start: 2020-07-10 | End: 2020-11-30

## 2020-07-10 RX ORDER — GABAPENTIN 300 MG/1
300 CAPSULE ORAL 3 TIMES DAILY
Qty: 90 CAPSULE | Refills: 6 | Status: SHIPPED | OUTPATIENT
Start: 2020-07-10 | End: 2020-07-10

## 2020-07-10 RX ORDER — GABAPENTIN 600 MG/1
600 TABLET ORAL DAILY
Qty: 30 TABLET | Refills: 2 | Status: SHIPPED | OUTPATIENT
Start: 2020-07-10 | End: 2020-07-10

## 2020-07-13 NOTE — PROGRESS NOTES
PATIENT: Luis Felipe Hawk  MRN: [id number]  DATE: 7/15/2020      Diagnosis:   1. Adenocarcinoma, lung, right    2. Cancer related pain    3. Lung disease    4. Therapeutic opioid induced constipation    5. Essential hypertension    6. Type 2 diabetes mellitus without complication, without long-term current use of insulin    7. Coronary artery disease, angina presence unspecified, unspecified vessel or lesion type, unspecified whether native or transplanted heart    8. History of CVA (cerebrovascular accident)    9. Dyslipidemia        Chief Complaint: Follow-up (NSCLC)    Oncologic History:      Oncologic History 11/21/19 CXR  3/13/20 CT Chest  4/01/20 IR biopsy  4/09/20 PET/CT  5/07/20 PORT placement  CT Chest     Oncologic Treatment 5/13/20 B 12 injection  5/20/20 Carboplatin/Alimta/Pembrolizumab cycle 3    Pathology 4/01/20 -  adenocarcinoma suggestive of a possible gastrointestinal origin     Oncologic History:   The patient was initially seen by Dr Carmona on 11/21/19 for a cough and underwent a CXR showing a soft tissue mass in the anterior segment of the right upper lobe that measures at least 4 cm in cranial-caudad extent.  Per chart review an attempt to contact MR Hawk was mad on multiple occasions in order to have a CT of the chest done.  CT of the chest was eventually done on 3/13/20 and showed enlarged right paratracheal LN measuring 2.8 cm, slightly enlarged right hilar nodes, a mass within the anterior segment of the right upper lobe measuring 3.9 x 4.7 cm abutting the right suprahilar region and obliterating the anterior segmental bronchus.  The patient underwent IR guided biopsy of the right lung on 4/01/20 showing adenocarcinoma suggestive of a possible gastrointestinal origin.  MRI of the brain on 4/09/20 showed no evidence of intracranial metastatic disease, remote small infarcts within the right superior frontal lobe and remote lacunar-type infarcts of the bilateral lentiform nuclei and right  thalamus.  PET/CT on 4/09/20 showed a 6.5 x 3.7 cm pulmonary mass in the anterior segment of the right upper lobe abutting the mediastinal pleura, large right pretracheal soft tissue mass measuring 4.4 x 3.8 cm, left prevascular lymph node measuring 1.0 cm, few scattered bilateral poorly defined subcentimeter pulmonary nodules, and a right chest wall soft tissue lesion partially eroding the lateral right 3rd rib measuring 2 x 2 cm.  The patient had a PORT placed in the right chest on 5/07/20. He received his B12 injection on 5/13/20.  He also underwent US of the carotid on 5/14/20 showing 0-19% internal carotid artery stenosis on the right with a lymph node in the proximal neck lateral to the common carotid artery measuring 2.8 x 1.7 x 3.6 cm.  On the left there is 0-19% stenosis.  His PORT was placed on 5/07/20.  The patient saw Dr Bennett with vascular neurology on 5/21/20 for CVA seen on MRI of the brain 4/09/20.  Recommendations were made for ASA, statin and BP control to prevent secondary stroke.  The patient was then see in the ER on 5/22/20 with concern for PNA and treated with Levaquin.  He then saw Nellie Herrera with palliative care on 6/03/20 for symptoms management.  MS Contin 60 mg q 8 hours, Percocet 5/325 1 tablet q 4 hours prn for breakthrough pain/cough.  Subjective:    Interval History: Mr. Hawk is a 54 y.o. male with HTN, HLD, DMII, CAD who presents to follow up for NSCLC adenocarcinoma.  Since the last clinic visit the patient underwent a CT of the chest on 7/09/20 showing a right paratracheal lymph node measuring 1.5 cm in short axis, previously 2.4 cm, a right upper lobe mass measuring 5.7 cm which is unchanged, patchy ground-glass opacities bilaterally, a 1.2 cm soft tissue density focus along the right posterior aspect of the trachea at the level of C7-T1, and a right chest wall mass centered at the destroyed 3rd rib measuring 6.6 cm, previously 4.3 cm.  The patient still complains of  pain in his right chest currently 4/10.  HE takes percocet every four hours up to 6 times a day.  He is also taking MS Contin 60mg every 8 hours.  He also complains of constipation which he states was better with miralax in the past.      Past Medical History:   Past Medical History:   Diagnosis Date    CAD (coronary artery disease) 2013    one stent placed    DMII (diabetes mellitus, type 2)     Hypertension     Lung cancer     Mass of right lung     Mass of upper lobe of right lung 3/16/2020    3/13/2020 CT chest: Right upper lobe mass obliterating the anterior segmental bronchus. This is associated with right hilar and paramediastinal adenopathy.  This is concerning for bronchogenic carcinoma.  Sampling recommended.       Past Surgical HIstory:   Past Surgical History:   Procedure Laterality Date    CV STENT      X 1    INSERTION OF TUNNELED CENTRAL VENOUS CATHETER (CVC) WITH SUBCUTANEOUS PORT N/A 5/7/2020    Procedure: INSERTION, PORT-A-CATH;  Surgeon: [facility];  Location: [facility] OR;  Service: Radiology;  Laterality: N/A;  RN PREOP 5/7/20--rapid covid done    LUNG BIOPSY N/A 4/1/2020    Procedure: BIOPSY, LUNG;  Surgeon: [facility];  Location: [facility] OR;  Service: Radiology;  Laterality: N/A;  730AM STARTRN PHONE PREOP 3/31/2020       Family History:   Family History   Problem Relation Age of Onset    Peripheral vascular disease Mother     Heart disease Father     No Known Problems Brother     No Known Problems Brother     No Known Problems Son     No Known Problems Son     No Known Problems Daughter     No Known Problems Daughter     No Known Problems Daughter        Social History:  reports that he has been smoking cigarettes. He has a 21.50 pack-year smoking history. He has never used smokeless tobacco. He reports current alcohol use of about 3.0 standard drinks of alcohol per week. He reports that he does not use drugs.    Allergies:  Review of patient's allergies  indicates:   Allergen Reactions    Tramadol      Chest burning       Medications:  Current Outpatient Medications   Medication Sig Dispense Refill    acetaminophen (TYLENOL) 325 MG tablet Take 325 mg by mouth every 6 (six) hours as needed for Pain.      albuterol (PROVENTIL/VENTOLIN HFA) 90 mcg/actuation inhaler INHALE 2 PUFFS BY MOUTH EVERY 6 HOURS AS NEEDED FOR WHEEZING 18 g 11    aspirin (ECOTRIN) 81 MG EC tablet Take 1 tablet (81 mg total) by mouth once daily. 30 tablet 11    atorvastatin (LIPITOR) 80 MG tablet Take 1 tablet (80 mg total) by mouth once daily. 90 tablet 3    blood-glucose meter kit To check BG 1 times daily, to use with insurance preferred meter 1 each 0    buPROPion (WELLBUTRIN SR) 150 MG TBSR 12 hr tablet Take 1 tablet (150 mg total) by mouth 2 (two) times daily. 60 tablet 0    cyproheptadine (PERIACTIN) 4 mg tablet Take 1 tablet (4 mg total) by mouth 4 (four) times daily. 120 tablet 0    folic acid (FOLVITE) 1 MG tablet Take 1 tablet (1 mg total) by mouth once daily. 30 tablet 6    gabapentin (NEURONTIN) 600 MG tablet Take 1 tablet (600 mg total) by mouth 3 (three) times daily. 30 tablet 2    hydroCHLOROthiazide (HYDRODIURIL) 25 MG tablet TK 1 T PO ONE TIME PER DAY FOR BP 90 tablet 3    metFORMIN (GLUCOPHAGE) 500 MG tablet 1 po qAM x 1 wk; then 1 po BID x 1 wk; then 2 AM/1 PM x 1 wk; then 2 BID maintenance. Take with food 120 tablet 5    metoprolol succinate (TOPROL-XL) 25 MG 24 hr tablet Take 1 tablet (25 mg total) by mouth once daily. 90 tablet 3    morphine (MS CONTIN) 60 MG 12 hr tablet Take 1 tablet (60 mg total) by mouth every 8 (eight) hours. 90 tablet 0    nicotine (NICODERM CQ) 21 mg/24 hr Place 1 patch onto the skin once daily. 28 patch 0    nicotine polacrilex 2 MG Lozg Use 1-2 per hour in place of a cigarette. Limit to 10 a day. 144 lozenge 0    ondansetron (ZOFRAN) 4 MG tablet Take 1 tablet (4 mg total) by mouth every 6 (six) hours as needed for Nausea. 90 tablet  "6    oxyCODONE-acetaminophen (PERCOCET)  mg per tablet Take 1 tablet by mouth every 4 (four) hours as needed for Pain. 90 tablet 0    polyethylene glycol (MIRALAX) 17 gram PwPk Take 17 g by mouth 2 (two) times daily as needed (Constipation). 30 each 6    senna-docusate 8.6-50 mg (PERICOLACE) 8.6-50 mg per tablet Take 2 tablets by mouth once daily. 60 tablet 6    naproxen (NAPROSYN) 500 MG tablet TAKE 1 TABLET(500 MG) BY MOUTH TWICE DAILY 60 tablet 11    nystatin (MYCOSTATIN) 100,000 unit/mL suspension Take 4 mLs (400,000 Units total) by mouth 4 (four) times daily with meals and nightly. for 10 days 160 mL 0     No current facility-administered medications for this visit.        Review of Systems   Constitutional: Negative for appetite change, chills, diaphoresis, fatigue, fever and unexpected weight change.   HENT: Negative for sore throat and trouble swallowing.    Respiratory: Negative for cough and shortness of breath.    Cardiovascular: Negative for chest pain and palpitations.   Gastrointestinal: Positive for constipation. Negative for abdominal pain, diarrhea, nausea and vomiting.   Musculoskeletal:        Pain in his lateral right chest radiating to his back.   Skin: Negative for color change and rash.   Neurological: Negative for headaches.   Hematological: Negative for adenopathy. Does not bruise/bleed easily.       ECOG Performance Status: 2   Objective:      Vitals:   Vitals:    07/15/20 1440   BP: 125/86   BP Location: Left arm   Patient Position: Sitting   BP Method: Medium (Automatic)   Pulse: (!) 112   Temp: 97.8 °F (36.6 °C)   TempSrc: Oral   SpO2: 95%   Weight: 75.3 kg (166 lb 0.1 oz)   Height: 5' 9" (1.753 m)       Physical Exam  Constitutional:       General: He is not in acute distress.     Appearance: He is well-developed. He is not diaphoretic.   HENT:      Head: Normocephalic and atraumatic.   Neck:      Musculoskeletal: Normal range of motion.   Cardiovascular:      Rate and " Rhythm: Normal rate and regular rhythm.      Heart sounds: Normal heart sounds. No murmur. No friction rub. No gallop.    Pulmonary:      Effort: Pulmonary effort is normal. No respiratory distress.      Breath sounds: Normal breath sounds. No wheezing or rales.   Chest:      Chest wall: No tenderness.   Abdominal:      General: Bowel sounds are normal. There is no distension.      Palpations: Abdomen is soft. There is no mass.      Tenderness: There is no abdominal tenderness. There is no rebound.   Musculoskeletal:      Comments: PORT in right chest   Lymphadenopathy:      Cervical: No cervical adenopathy.      Upper Body:      Right upper body: No supraclavicular adenopathy.      Left upper body: No supraclavicular adenopathy.   Skin:     General: Skin is warm and dry.      Findings: No erythema or rash.   Neurological:      Mental Status: He is alert and oriented to person, place, and time.   Psychiatric:         Behavior: Behavior normal.         Laboratory Data:  No visits with results within 1 Week(s) from this visit.   Latest known visit with results is:   Admission on 07/04/2020, Discharged on 07/04/2020   Component Date Value Ref Range Status    WBC 07/04/2020 9.26  3.90 - 12.70 K/uL Final    RBC 07/04/2020 4.08* 4.60 - 6.20 M/uL Final    Hemoglobin 07/04/2020 11.1* 14.0 - 18.0 g/dL Final    Hematocrit 07/04/2020 34.7* 40.0 - 54.0 % Final    Mean Corpuscular Volume 07/04/2020 85  82 - 98 fL Final    Mean Corpuscular Hemoglobin 07/04/2020 27.2  27.0 - 31.0 pg Final    Mean Corpuscular Hemoglobin Conc 07/04/2020 32.0  32.0 - 36.0 g/dL Final    RDW 07/04/2020 18.2* 11.5 - 14.5 % Final    Platelets 07/04/2020 321  150 - 350 K/uL Final    MPV 07/04/2020 9.5  9.2 - 12.9 fL Final    Immature Granulocytes 07/04/2020 0.2  0.0 - 0.5 % Final    Gran # (ANC) 07/04/2020 6.0  1.8 - 7.7 K/uL Final    Immature Grans (Abs) 07/04/2020 0.02  0.00 - 0.04 K/uL Final    Comment: Mild elevation in immature  granulocytes is non specific and   can be seen in a variety of conditions including stress response,   acute inflammation, trauma and pregnancy. Correlation with other   laboratory and clinical findings is essential.      Lymph # 07/04/2020 2.3  1.0 - 4.8 K/uL Final    Mono # 07/04/2020 0.9  0.3 - 1.0 K/uL Final    Eos # 07/04/2020 0.0  0.0 - 0.5 K/uL Final    Baso # 07/04/2020 0.01  0.00 - 0.20 K/uL Final    nRBC 07/04/2020 0  0 /100 WBC Final    Gran% 07/04/2020 65.3  38.0 - 73.0 % Final    Lymph% 07/04/2020 24.8  18.0 - 48.0 % Final    Mono% 07/04/2020 9.6  4.0 - 15.0 % Final    Eosinophil% 07/04/2020 0.0  0.0 - 8.0 % Final    Basophil% 07/04/2020 0.1  0.0 - 1.9 % Final    Differential Method 07/04/2020 Automated   Final    Sodium 07/04/2020 144  136 - 145 mmol/L Final    Potassium 07/04/2020 3.5  3.5 - 5.1 mmol/L Final    Chloride 07/04/2020 108  95 - 110 mmol/L Final    CO2 07/04/2020 22* 23 - 29 mmol/L Final    Glucose 07/04/2020 123* 70 - 110 mg/dL Final    BUN, Bld 07/04/2020 17  6 - 20 mg/dL Final    Creatinine 07/04/2020 0.7  0.5 - 1.4 mg/dL Final    Calcium 07/04/2020 9.5  8.7 - 10.5 mg/dL Final    Total Protein 07/04/2020 7.1  6.0 - 8.4 g/dL Final    Albumin 07/04/2020 3.0* 3.5 - 5.2 g/dL Final    Total Bilirubin 07/04/2020 0.3  0.1 - 1.0 mg/dL Final    Comment: For infants and newborns, interpretation of results should be based  on gestational age, weight and in agreement with clinical  observations.  Premature Infant recommended reference ranges:  Up to 24 hours.............<8.0 mg/dL  Up to 48 hours............<12.0 mg/dL  3-5 days..................<15.0 mg/dL  6-29 days.................<15.0 mg/dL      Alkaline Phosphatase 07/04/2020 106  55 - 135 U/L Final    AST 07/04/2020 17  10 - 40 U/L Final    ALT 07/04/2020 10  10 - 44 U/L Final    Anion Gap 07/04/2020 14  8 - 16 mmol/L Final    eGFR if African American 07/04/2020 >60  >60 mL/min/1.73 m^2 Final    eGFR if non   07/04/2020 >60  >60 mL/min/1.73 m^2 Final    Comment: Calculation used to obtain the estimated glomerular filtration  rate (eGFR) is the CKD-EPI equation.       Troponin I 07/04/2020 <0.006  0.000 - 0.026 ng/mL Final    Comment: The reference interval for Troponin I represents the 99th percentile   cutoff   for our facility and is consistent with 3rd generation assay   performance.      Magnesium 07/04/2020 1.6  1.6 - 2.6 mg/dL Final    Lactate (Lactic Acid) 07/04/2020 2.0  0.5 - 2.2 mmol/L Final    Comment: Falsely low lactic acid results can be found in samples   containing >=13.0 mg/dL total bilirubin and/or >=3.5 mg/dL   direct bilirubin.  Specimen slightly hemolyzed      TSH 07/04/2020 0.845  0.400 - 4.000 uIU/mL Final    Specimen UA 07/04/2020 Urine, Clean Catch   Final    Color, UA 07/04/2020 Yellow  Yellow, Straw, Julieta Final    Appearance, UA 07/04/2020 Clear  Clear Final    pH, UA 07/04/2020 5.0  5.0 - 8.0 Final    Specific Gravity, UA 07/04/2020 1.020  1.005 - 1.030 Final    Protein, UA 07/04/2020 Negative  Negative Final    Comment: Recommend a 24 hour urine protein or a urine   protein/creatinine ratio if globulin induced proteinuria is  clinically suspected.      Glucose, UA 07/04/2020 Negative  Negative Final    Ketones, UA 07/04/2020 Negative  Negative Final    Bilirubin (UA) 07/04/2020 Negative  Negative Final    Occult Blood UA 07/04/2020 Negative  Negative Final    Nitrite, UA 07/04/2020 Negative  Negative Final    Urobilinogen, UA 07/04/2020 Negative  <2.0 EU/dL Final    Leukocytes, UA 07/04/2020 Negative  Negative Final    Blood Culture, Routine 07/04/2020 No Growth after 4 days.    Final    Blood Culture, Routine 07/04/2020 No Growth after 4 days.    Final    SARS-CoV2 (COVID-19) Qualitative P* 07/04/2020 Not Detected  Not Detected Final    Comment: This test utilizes a real-time reverse transcription  polymerase chain reaction procedure to amplify and    detect the SARS-CoV-2 RdRp and N genes.    The analytical sensitivity (limit of detection) of   this assay is 100 copies/mL.   A Detected result is considered positive for COVID-19.  This patient is considered infected with the   SARS-CoV-2 virus and is presumed to be contagious.    A Not Detected result means that SARS-CoV-2 RNA is not  present above the limit of detection. It does not rule  out the possibility of COVID-19 and should not be the  sole basis for treatment decisions.  If COVID-19 is   strongly suspected based on clinical and exposure   history,re-testing should be considered.    This test is only for use under Food and Drug   Administration s Emergency Use Authorization (EUA).   Commercial reagents are provided by [employer].  Performance characteristics of the EUA have been   independently verified by [facility]   Department of Pathology and L                           aboratory Medicine.             Imaging: CT Chest 7/09/20  Right IJ port with catheter terminating in the SVC.  No pericardial or pleural effusion.  No hilar lymphadenopathy.  Right paratracheal lymph node measures 1.5 cm in short axis, previously 2.4 cm in short axis (series 2, image 38).  There is interval heart is normal size.  Thoracic aorta is normal caliber.     Right upper lobe mass measures 5.7 cm, unchanged (series 2, image 51).  Patchy ground-glass opacities are noted bilaterally.  There is a 1.2 cm soft tissue density focus along the right posterior aspect of the trachea at the level of C7-T1, not seen previously.  Central airways are otherwise patent.     Right chest wall mass centered at the destroyed 3rd rib measures 6.6 cm, previously 4.3 cm.  Limited evaluation of the upper abdomen is unremarkable.     Assessment:       1. Adenocarcinoma, lung, right    2. Cancer related pain    3. Lung disease    4. Therapeutic opioid induced constipation    5. Essential hypertension    6. Type 2 diabetes  mellitus without complication, without long-term current use of insulin    7. Coronary artery disease, angina presence unspecified, unspecified vessel or lesion type, unspecified whether native or transplanted heart    8. History of CVA (cerebrovascular accident)    9. Dyslipidemia           Plan:     NSCLC - biopsy of the right lung on 4/01/20 showed adenocarcinoma  -PET/CT on 4/09/20 showed 6.5 x 3.7 cm pulmonary mass in the anterior segment of the right upper lobe abutting the mediastinal pleura, large right pretracheal soft tissue mass measuring 4.4 x 3.8 cm, left prevascular lymph node measuring 1.0 cm, few scattered bilateral poorly defined subcentimeter pulmonary nodules, and a right chest wall soft tissue lesion partially eroding the lateral right 3rd rib measuring 2 x 2 cm  -The patient has received 3 cycles of Carboplatin, Pemetrexed and Pembrolizumab   -Repeat CT of the chest on 7/09/20 shows progression of the right chest wall mass now measuring 6.6cm  -Pt discussed at thoracic tumor board with no intra pulmonary intervention which could improve the patient's respiratory status  -Will have the see radiation oncology at [facility] for radiation to the right chest wall mass for pain control.  -Given there is no tissue left for analysis, will have the patient undergo repeat biopsy with IR in order to send tissue for STRATA.  Pt agreed to the STRATA trial.  Pt instructed to hold ASA for biopsy.  -Will proceed second line of chemo once radiation completed    Cancer Related pain - Pt taking 60 Mg MS Contin every 8 hours.  -Pt also taking percocet 10-325mg every 4 hours.    -Palliative care managing  -Continue gabapentin  -Pt to see radiation oncology concerning radiation to the right chest wall mass.    Ground Glass Opacities - the patient has underlying parenchymal disease of the lung  -The patient has had multiple admissions after chemo treatment for PNA  -Will have the patient see pulmonary for optimization of  his respiratory status     Opioid Induced Constipation - Pt continues to have opioid induced constipation  -Pt states miralax helps  -Will refill    HTN - pt on HCTZ and metoprolol  -Will monitor    HLD - pt on lipitor  -PCP managing    DMII - pt's A1C is 10.1 on 3/13/20  -Pt on metformin  -Will monitor    CAD -  Pt on statin, ASA, metoprolol    CVA - recent MRI of the brain on 4/09/20 showed old infarcts in the right superior frontal lobe and remote lacunar-type infarcts of the bilateral lentiform nuclei and right thalamus  -Pt already on ASA and statin  -PT saw Dr Bennett on 5/21/20 who recommended continued ASA, statin and BP control    Advance Care Planning     Power of   I initiated the process of advance care planning today and explained the importance of this process to the patient.  I introduced the concept of advance directives to the patient, as well. Then the patient received detailed information about the importance of designating a Health Care Power of  (HCPOA). He was also instructed to communicate with this person about their wishes for future healthcare, should he become sick and lose decision-making capacity. The patient has not previously appointed a HCPOA. After our discussion, the patient has decided to complete a HCPOA and will bring the form completed to his next clinic appointment.                -Follow Up - 7/22/20 with labs prior.    Richar Avendaño MD  Hematology and Oncology  [facility]  Office:[phone]  Fax: [phone]

## 2020-07-15 ENCOUNTER — OFFICE VISIT (OUTPATIENT)
Dept: HEMATOLOGY/ONCOLOGY | Facility: CLINIC | Age: 55
End: 2020-07-15
Payer: MEDICAID

## 2020-07-15 VITALS
OXYGEN SATURATION: 95 % | HEART RATE: 112 BPM | DIASTOLIC BLOOD PRESSURE: 86 MMHG | SYSTOLIC BLOOD PRESSURE: 125 MMHG | TEMPERATURE: 98 F | WEIGHT: 166 LBS | BODY MASS INDEX: 24.59 KG/M2 | HEIGHT: 69 IN

## 2020-07-15 DIAGNOSIS — I10 ESSENTIAL HYPERTENSION: ICD-10-CM

## 2020-07-15 DIAGNOSIS — G89.3 CANCER RELATED PAIN: ICD-10-CM

## 2020-07-15 DIAGNOSIS — Z86.73 HISTORY OF CVA (CEREBROVASCULAR ACCIDENT): ICD-10-CM

## 2020-07-15 DIAGNOSIS — I25.10 CORONARY ARTERY DISEASE, ANGINA PRESENCE UNSPECIFIED, UNSPECIFIED VESSEL OR LESION TYPE, UNSPECIFIED WHETHER NATIVE OR TRANSPLANTED HEART: ICD-10-CM

## 2020-07-15 DIAGNOSIS — E11.9 TYPE 2 DIABETES MELLITUS WITHOUT COMPLICATION, WITHOUT LONG-TERM CURRENT USE OF INSULIN: ICD-10-CM

## 2020-07-15 DIAGNOSIS — K59.03 THERAPEUTIC OPIOID INDUCED CONSTIPATION: ICD-10-CM

## 2020-07-15 DIAGNOSIS — T40.2X5A THERAPEUTIC OPIOID INDUCED CONSTIPATION: ICD-10-CM

## 2020-07-15 DIAGNOSIS — J98.4 LUNG DISEASE: ICD-10-CM

## 2020-07-15 DIAGNOSIS — C34.91 ADENOCARCINOMA, LUNG, RIGHT: Primary | ICD-10-CM

## 2020-07-15 DIAGNOSIS — E78.5 DYSLIPIDEMIA: ICD-10-CM

## 2020-07-15 PROCEDURE — 99215 OFFICE O/P EST HI 40 MIN: CPT | Mod: PBBFAC | Performed by: INTERNAL MEDICINE

## 2020-07-15 PROCEDURE — 2024F PR 7 FIELD PHOTOS WITH INTERP/ REVIEW: ICD-10-PCS | Mod: ,,, | Performed by: INTERNAL MEDICINE

## 2020-07-15 PROCEDURE — 99999 PR PBB SHADOW E&M-EST. PATIENT-LVL V: ICD-10-PCS | Mod: PBBFAC,,, | Performed by: INTERNAL MEDICINE

## 2020-07-15 PROCEDURE — 2024F 7 FLD RTA PHOTO EVC RTNOPTHY: CPT | Mod: ,,, | Performed by: INTERNAL MEDICINE

## 2020-07-15 PROCEDURE — 99999 PR PBB SHADOW E&M-EST. PATIENT-LVL V: CPT | Mod: PBBFAC,,, | Performed by: INTERNAL MEDICINE

## 2020-07-15 PROCEDURE — 99214 OFFICE O/P EST MOD 30 MIN: CPT | Mod: S$PBB,,, | Performed by: INTERNAL MEDICINE

## 2020-07-15 PROCEDURE — 99214 PR OFFICE/OUTPT VISIT, EST, LEVL IV, 30-39 MIN: ICD-10-PCS | Mod: S$PBB,,, | Performed by: INTERNAL MEDICINE

## 2020-07-15 RX ORDER — POLYETHYLENE GLYCOL 3350 17 G/17G
17 POWDER, FOR SOLUTION ORAL 2 TIMES DAILY PRN
Qty: 30 EACH | Refills: 6 | Status: SHIPPED | OUTPATIENT
Start: 2020-07-15 | End: 2020-09-11

## 2020-07-15 NOTE — Clinical Note
The patient will need an appointment with radiation oncology at [facility] for radiation to the right chest wall mass.  He will need an appointment with IR for biopsy of the right chest mass.  He will need a pulmonary appt for abnormal chest CT and  ground glass opacities.

## 2020-07-21 ENCOUNTER — HOSPITAL ENCOUNTER (OUTPATIENT)
Dept: PREADMISSION TESTING | Facility: HOSPITAL | Age: 55
Discharge: HOME | End: 2020-07-21
Attending: RADIOLOGY
Payer: MEDICAID

## 2020-07-21 VITALS
HEIGHT: 68 IN | TEMPERATURE: 1 F | OXYGEN SATURATION: 97 % | WEIGHT: 168 LBS | HEART RATE: 88 BPM | RESPIRATION RATE: 18 BRPM | SYSTOLIC BLOOD PRESSURE: 124 MMHG | BODY MASS INDEX: 25.46 KG/M2 | DIASTOLIC BLOOD PRESSURE: 79 MMHG

## 2020-07-21 DIAGNOSIS — Z01.818 PREOP TESTING: ICD-10-CM

## 2020-07-21 LAB
INR PPP: 1 (ref 0.8–1.2)
PROTHROMBIN TIME: 10.8 SEC (ref 9–12.5)
SARS-COV-2 RDRP RESP QL NAA+PROBE: NEGATIVE

## 2020-07-21 PROCEDURE — U0002 COVID-19 LAB TEST NON-CDC: HCPCS

## 2020-07-21 PROCEDURE — 85610 PROTHROMBIN TIME: CPT

## 2020-07-21 PROCEDURE — 36415 COLL VENOUS BLD VENIPUNCTURE: CPT

## 2020-07-21 NOTE — DISCHARGE INSTRUCTIONS
Your procedure  is scheduled for _7/22/2020_________.      Report to Same Day Surgery Unit at _8:00_ AM on the 2nd floor of the hospital.  Enter through the Emergency Room    Important instructions:   Do not eat or drink after 12 midnight, including water.  It is okay to brush your teeth.  Do not have gum, candy or mints.     Take your morning medications with small sips of water.   Do not take diabetic medication.     Do not take blood thinners such as aspirin, ibuprofen, naproxyn (Aleve), fish oil tablets, Vitamin E, meloxicam.       Please shower the night before and the morning of your surgery.  Use Hibiclens soap if instructed to do so.     You may wear deodorant only.      Do not wear powder, body lotion or perfume/cologne.     Do not wear any jewelry or have any metal on your body.     You will be asked to remove any dentures or partials for the procedure.     Please bring any documents given to you by your doctor.     If you are going home on the same day of surgery, you must arrange for a family member or a friend to drive you home.  Public transportation is prohibited.  You will not be able to drive home if you were given anesthesia or sedation.     Wear loose fitting clothes allowing for bandages.     Please leave money and valuables home.       You may bring your cell phone.     Call the doctor if fever or illness should occur before your surgery.    Call [phone] to contact us here if needed.

## 2020-07-22 ENCOUNTER — HOSPITAL ENCOUNTER (OUTPATIENT)
Facility: HOSPITAL | Age: 55
Discharge: HOME | End: 2020-07-22
Attending: INTERNAL MEDICINE | Admitting: RADIOLOGY
Payer: MEDICAID

## 2020-07-22 ENCOUNTER — TELEPHONE (OUTPATIENT)
Dept: HEMATOLOGY/ONCOLOGY | Facility: CLINIC | Age: 55
End: 2020-07-22

## 2020-07-22 VITALS
WEIGHT: 168 LBS | DIASTOLIC BLOOD PRESSURE: 77 MMHG | SYSTOLIC BLOOD PRESSURE: 117 MMHG | TEMPERATURE: 98 F | BODY MASS INDEX: 25.54 KG/M2 | RESPIRATION RATE: 16 BRPM | OXYGEN SATURATION: 95 % | HEART RATE: 99 BPM

## 2020-07-22 DIAGNOSIS — C34.91 ADENOCARCINOMA, LUNG, RIGHT: ICD-10-CM

## 2020-07-22 DIAGNOSIS — C34.91 ADENOCARCINOMA OF LUNG, RIGHT: Primary | ICD-10-CM

## 2020-07-22 DIAGNOSIS — Z01.818 PREOP TESTING: ICD-10-CM

## 2020-07-22 LAB — POCT GLUCOSE: 126 MG/DL (ref 70–110)

## 2020-07-22 PROCEDURE — 82962 GLUCOSE BLOOD TEST: CPT

## 2020-07-22 PROCEDURE — 63600175 PHARM REV CODE 636 W HCPCS: Performed by: RADIOLOGY

## 2020-07-22 RX ORDER — MORPHINE SULFATE 10 MG/ML
3 INJECTION INTRAMUSCULAR; INTRAVENOUS; SUBCUTANEOUS
Status: DISCONTINUED | OUTPATIENT
Start: 2020-07-22 | End: 2020-07-22 | Stop reason: HOSPADM

## 2020-07-22 RX ORDER — MIDAZOLAM HYDROCHLORIDE 1 MG/ML
INJECTION INTRAMUSCULAR; INTRAVENOUS CODE/TRAUMA/SEDATION MEDICATION
Status: COMPLETED | OUTPATIENT
Start: 2020-07-22 | End: 2020-07-22

## 2020-07-22 RX ORDER — FENTANYL CITRATE 50 UG/ML
INJECTION, SOLUTION INTRAMUSCULAR; INTRAVENOUS CODE/TRAUMA/SEDATION MEDICATION
Status: COMPLETED | OUTPATIENT
Start: 2020-07-22 | End: 2020-07-22

## 2020-07-22 RX ADMIN — FENTANYL CITRATE 50 MCG: 50 INJECTION INTRAMUSCULAR; INTRAVENOUS at 09:07

## 2020-07-22 RX ADMIN — MIDAZOLAM HYDROCHLORIDE 1 MG: 1 INJECTION, SOLUTION INTRAMUSCULAR; INTRAVENOUS at 09:07

## 2020-07-22 NOTE — DISCHARGE SUMMARY
Radiology Discharge Summary      Hospital Course: No complications    Admit Date: 7/22/2020  Discharge Date: 07/22/2020     Instructions Given to Patient: Yes    Diet: Resume prior diet     Activity: activity as tolerated and no driving for today    Description of Condition on Discharge: Stable    Vital Signs (Most Recent): Temp: 98.5 °F (36.9 °C) (07/22/20 1035)  Pulse: 95 (07/22/20 1035)  Resp: 16 (07/22/20 1035)  BP: (!) 140/84 (07/22/20 1035)  SpO2: 97 % (07/22/20 1035)    Discharge Disposition: Home    Discharge Diagnosis:  53 y/o M with worsening metastatic Rt lung adenocarcinoma despite chemotherapy s/p successful CT-guided percutaneous 18-gauge core biopsy of right lateral chest wall/Rt 3rd rib mass under moderate conscious sedation. Patient tolerated the procedure well. No immediate post-procedural complications noted.      Thank you for considering IR for the care of your patient.     Gera Pryor MD  Interventional Radiology

## 2020-07-22 NOTE — BRIEF OP NOTE
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 Patient tolerated the procedure well. No immediate post-procedural complications noted.     Thank you for considering IR for the care of your patient.     Gera Pryor MD  Interventional Radiology

## 2020-07-22 NOTE — SEDATION DOCUMENTATION
Report called to THOR Tolbert in [facility]. Right upper lobe lung biopsy completed with adequate sample obtained. Tolerated well. Site with bandaid, CDI, no redness, swelling or hematoma noted. FATIMAH LAYNE. Transported to [facility] per RN.

## 2020-07-22 NOTE — H&P
Radiology History & Physical      SUBJECTIVE:     Chief Complaint: NSCLC    History of Present Illness:  Luis Felipe Hawk is a 54 y.o. 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     Given this, pt is being considered for STRATA trial for which further tissue analysis will be required.     New outpatient IR consult placed for CT-guided percutaneous 18-gauge core biopsy of right lateral chest wall/Rt 3rd rib mass.    Past Medical History:   Diagnosis Date    CAD (coronary artery disease) 2013    one stent placed    DMII (diabetes mellitus, type 2)     Hypertension     Lung cancer     Mass of right lung     Mass of upper lobe of right lung 3/16/2020    3/13/2020 CT chest: Right upper lobe mass obliterating the anterior segmental bronchus. This is associated with right hilar and paramediastinal adenopathy.  This is concerning for bronchogenic carcinoma.  Sampling recommended.     Past Surgical History:   Procedure Laterality Date    CV STENT      X 1    INSERTION OF TUNNELED CENTRAL VENOUS CATHETER (CVC) WITH SUBCUTANEOUS PORT N/A 5/7/2020    Procedure: INSERTION, PORT-A-CATH;  Surgeon: Dosc Diagnostic Provider;  Location: [facility] OR;  Service: Radiology;  Laterality: N/A;  RN PREOP 5/7/20--rapid covid done    LUNG BIOPSY N/A 4/1/2020    Procedure: BIOPSY, LUNG;  Surgeon: Dosc Diagnostic Provider;  Location: [facility] OR;  Service: Radiology;  Laterality: N/A;  730AM STARTRN PHONE PREOP 3/31/2020     Home Meds:   Prior to Admission medications    Medication Sig Start Date End Date Taking? Authorizing Provider   acetaminophen (TYLENOL) 325 MG tablet Take 325 mg by mouth every 6 (six) hours as needed for Pain.   Yes Historical Provider, MD   atorvastatin (LIPITOR) 80 MG tablet Take 1 tablet (80 mg total) by mouth once daily. 4/7/20  Yes El HAMPTON  MD Kristine   buPROPion (WELLBUTRIN SR) 150 MG TBSR 12 hr tablet Take 1 tablet (150 mg total) by mouth 2 (two) times daily. 6/30/20  Yes Ja Wiggins MD   cyproheptadine (PERIACTIN) 4 mg tablet Take 1 tablet (4 mg total) by mouth 4 (four) times daily. 7/2/20  Yes Iveth Hope MD   folic acid (FOLVITE) 1 MG tablet Take 1 tablet (1 mg total) by mouth once daily. 5/20/20 5/20/21 Yes Richar Avendaño MD   gabapentin (NEURONTIN) 600 MG tablet Take 1 tablet (600 mg total) by mouth 3 (three) times daily. 7/10/20 7/10/21 Yes Nellie Herrera NP   hydroCHLOROthiazide (HYDRODIURIL) 25 MG tablet TK 1 T PO ONE TIME PER DAY FOR BP 11/21/19  Yes El Carmona MD   metFORMIN (GLUCOPHAGE) 500 MG tablet 1 po qAM x 1 wk; then 1 po BID x 1 wk; then 2 AM/1 PM x 1 wk; then 2 BID maintenance. Take with food 3/16/20  Yes El Carmona MD   metoprolol succinate (TOPROL-XL) 25 MG 24 hr tablet Take 1 tablet (25 mg total) by mouth once daily. 11/21/19 11/20/20 Yes El Carmona MD   morphine (MS CONTIN) 60 MG 12 hr tablet Take 1 tablet (60 mg total) by mouth every 8 (eight) hours. 7/6/20 7/6/21 Yes Richar Avendaño MD   naproxen (NAPROSYN) 500 MG tablet TAKE 1 TABLET(500 MG) BY MOUTH TWICE DAILY 7/5/20  Yes El Carmona MD   nicotine (NICODERM CQ) 21 mg/24 hr Place 1 patch onto the skin once daily. 6/30/20 7/30/20 Yes Ja Wiggins MD   nicotine polacrilex 2 MG Lozg Use 1-2 per hour in place of a cigarette. Limit to 10 a day. 6/30/20  Yes Ja Wiggins MD   ondansetron (ZOFRAN) 4 MG tablet Take 1 tablet (4 mg total) by mouth every 6 (six) hours as needed for Nausea. 4/29/20  Yes Richar Avendaño MD   oxyCODONE-acetaminophen (PERCOCET)  mg per tablet Take 1 tablet by mouth every 4 (four) hours as needed for Pain. 7/8/20 7/8/21 Yes Nellie Herrera, HEMANT   polyethylene glycol (MIRALAX) 17 gram PwPk Take 17 g by mouth 2 (two) times daily as needed (Constipation). 7/15/20  Yes Richar Avendaño MD   senna-docusate  8.6-50 mg (PERICOLACE) 8.6-50 mg per tablet Take 2 tablets by mouth once daily. 4/13/20  Yes Richar Avendaño MD   albuterol (PROVENTIL/VENTOLIN HFA) 90 mcg/actuation inhaler INHALE 2 PUFFS BY MOUTH EVERY 6 HOURS AS NEEDED FOR WHEEZING 7/5/20   El Carmona MD   aspirin (ECOTRIN) 81 MG EC tablet Take 1 tablet (81 mg total) by mouth once daily. 11/21/19 11/20/20  El Carmona MD   blood-glucose meter kit To check BG 1 times daily, to use with insurance preferred meter 3/6/20   El Carmona MD     Anticoagulants/Antiplatelets: no anticoagulation    Allergies:   Review of patient's allergies indicates:   Allergen Reactions    Tramadol      Chest burning     Sedation History:  no adverse reactions    Review of Systems:   Hematological: no known coagulopathies  Respiratory: positive for - pleuritic pain and shortness of breath  Cardiovascular: positive for - chest pain, dyspnea on exertion and shortness of breath  Gastrointestinal: no abdominal pain, change in bowel habits, or black or bloody stools  Genito-Urinary: no dysuria, trouble voiding, or hematuria  Musculoskeletal: negative  Neurological: no TIA or stroke symptoms       OBJECTIVE:     Vital Signs (Most Recent)  Temp: 98.3 °F (36.8 °C) (07/22/20 0848)  Pulse: 85 (07/22/20 0848)  Resp: 18 (07/22/20 0848)  BP: (!) 151/95 (07/22/20 0848)  SpO2: 97 % (07/22/20 0848)    Physical Exam:  ASA: III  Mallampati: II    General: no acute distress  Mental Status: alert and oriented to person, place and time  HEENT: normocephalic, atraumatic  Chest: unlabored breathing  Heart: regular heart rate  Abdomen: nondistended  Extremity: moves all extremities    Laboratory  Lab Results   Component Value Date    INR 1.0 07/21/2020       Lab Results   Component Value Date    WBC 9.26 07/04/2020    HGB 11.1 (L) 07/04/2020    HCT 34.7 (L) 07/04/2020    MCV 85 07/04/2020     07/04/2020      Lab Results   Component Value Date     (H) 07/04/2020     07/04/2020     K 3.5 07/04/2020     07/04/2020    CO2 22 (L) 07/04/2020    BUN 17 07/04/2020    CREATININE 0.7 07/04/2020    CALCIUM 9.5 07/04/2020    MG 1.6 07/04/2020    ALT 10 07/04/2020    AST 17 07/04/2020    ALBUMIN 3.0 (L) 07/04/2020    BILITOT 0.3 07/04/2020       ASSESSMENT/PLAN:     54 y.o. 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     1. Right lung metastatic adenocarcinoma, not improving with chemotherapy - Will attempt CT-guided percutaneous 18-gauge core biopsy of right lateral chest wall/Rt 3rd rib mass under moderate conscious sedaiton.    Risks (including, but not limited to, pain, bleeding, infection, damage to nearby structures, failure to obtain sufficient material for a diagnosis, the need for additional procedures, and death), benefits, and alternatives were discussed with the patient. All questions were answered to the best of my abilities. The patient wishes to proceed with the procedure. Written informed consent was obtained.    Thank you for considering IR for the care of your patient.     Gera Pryor MD  Interventional Radiology

## 2020-07-22 NOTE — TELEPHONE ENCOUNTER
Pt scheduled with Mony  At [facility] radiation Oncology for July 30 th at 11:00 for a consult    Tc to patient advising him not to present to infusion center tomorrow as Dr Martin would like to hold his chemo at this time until he has path report available from today He acknowledged understanding    TC to infusion center  Spoke with Nellie informed her that patient is on hold until path results are available.

## 2020-07-22 NOTE — DISCHARGE INSTRUCTIONS
BATHING:  ? You may shower tomorrow.  DRESSING:  ? Remove dressing tomorrow.        ACTIVITY LEVEL: If you have received sedation or an anesthetic, you may feel sleepy for several hours. Rest until you are more awake. Gradually resume your normal activities    Do not drive, drink alcohol, or sign legal documents for 24 hours, or if taking narcotic pain medication.      DIET: You may resume your home diet. If nausea is present, increase your diet gradually with fluids and bland foods.    Medications: Pain medication should be taken only if needed and as directed. If antibiotics are prescribed, the medication should be taken until completed. You will be given an updated list of you medications.  ? No driving, alcoholic beverages or signing legal documents for next 24 hours if you have had sedation, or while taking pain medication    CALL THE DOCTOR:   For any obvious bleeding (some dried blood over the incision is normal).     Redness, swelling, foul smell around incision or fever over 101.  Shortness of breath.  Persistent pain or nausea not relieved by medication.  Call  [phone]     to speak with an Interventional Radiologist    If any unusual problems or difficulties occur contact your doctor. If you cannot contact your doctor but feel your signs and symptoms warrant a physicians attention return to the emergency room.             Fall Prevention  Millions of people fall every year and injure themselves. You may have had anesthesia or sedation which may increase your risk of falling. You may have health issues that put you at an increased risk of falling.     Here are ways to reduce your risk of falling.  ·   · Make your home safe by keeping walkways clear of objects you may trip over.  · Use non-slip pads under rugs. Do not use area rugs or small throw rugs.  · Use non-slip mats in bathtubs and showers.  · Install handrails and lights on staircases.  · Do not walk in poorly lit areas.  · Do not stand on chairs  or wobbly ladders.  · Use caution when reaching overhead or looking upward. This position can cause a loss of balance.  · Be sure your shoes fit properly, have non-slip bottoms and are in good condition.   · Wear shoes both inside and out. Avoid going barefoot or wearing slippers.  · Be cautious when going up and down stairs, curbs, and when walking on uneven sidewalks.  · If your balance is poor, consider using a cane or walker.  · If your fall was related to alcohol use, stop or limit alcohol intake.   · If your fall was related to use of sleeping medicines, talk to your doctor about this. You may need to reduce your dosage at bedtime if you awaken during the night to go to the bathroom.    · To reduce the need for nighttime bathroom trips:  ¨ Avoid drinking fluids for several hours before going to bed  ¨ Empty your bladder before going to bed  ¨ Men can keep a urinal at the bedside  · Stay as active as you can. Balance, flexibility, strength, and endurance all come from exercise. They all play a role in preventing falls. Ask your healthcare provider which types of activity are right for you.  · Get your vision checked on a regular basis.  · If you have pets, know where they are before you stand up or walk so you don't trip over them.  · Use night lights.

## 2020-07-22 NOTE — SEDATION DOCUMENTATION
Biopsy needle advanced into lung. Tolerated well. VSS. NADN. CT scan completed. Placement of needle confirmed per MD on CT images.

## 2020-07-22 NOTE — SEDATION DOCUMENTATION
Consent obtained per MD and verified per RN. Pt wheeled into CT room and moved with assistance to CT scanner table. Tolerated well. Attached to vitals monitor. FATIMAH LAYNE.

## 2020-07-22 NOTE — SEDATION DOCUMENTATION
Time out completed. Moderate sedation administered per RN. Site prepped in sterile fashion per MD.

## 2020-07-22 NOTE — SEDATION DOCUMENTATION
MD in room. Measurements on initial CT completed per MD. Site to right upper lobe of lung identified as biopsy site. Site marked per RT and MD.

## 2020-07-22 NOTE — SEDATION DOCUMENTATION
Lexy with pathology took specimens to lab for adequacy check per pathologist. Will await return call about adequacy. Pt resting comfortably on CT table. VSS. NADN.

## 2020-07-24 ENCOUNTER — DOCUMENTATION ONLY (OUTPATIENT)
Dept: HEMATOLOGY/ONCOLOGY | Facility: CLINIC | Age: 55
End: 2020-07-24

## 2020-07-24 ENCOUNTER — TELEPHONE (OUTPATIENT)
Dept: HEMATOLOGY/ONCOLOGY | Facility: CLINIC | Age: 55
End: 2020-07-24

## 2020-07-24 ENCOUNTER — HOSPITAL ENCOUNTER (OUTPATIENT)
Dept: RADIOLOGY | Facility: HOSPITAL | Age: 55
Discharge: HOME | End: 2020-07-24
Attending: INTERNAL MEDICINE
Payer: MEDICAID

## 2020-07-24 DIAGNOSIS — G89.3 CANCER RELATED PAIN: ICD-10-CM

## 2020-07-24 DIAGNOSIS — C34.91 ADENOCARCINOMA, LUNG, RIGHT: ICD-10-CM

## 2020-07-24 DIAGNOSIS — R52 PAIN MANAGEMENT: ICD-10-CM

## 2020-07-24 PROCEDURE — 78815 PET IMAGE W/CT SKULL-THIGH: CPT | Mod: TC

## 2020-07-24 PROCEDURE — 78815 NM PET CT ROUTINE: ICD-10-PCS | Mod: 26,PS,, | Performed by: RADIOLOGY

## 2020-07-24 PROCEDURE — 78815 PET IMAGE W/CT SKULL-THIGH: CPT | Mod: 26,PS,, | Performed by: RADIOLOGY

## 2020-07-24 PROCEDURE — A9552 F18 FDG: HCPCS

## 2020-07-24 RX ORDER — OXYCODONE AND ACETAMINOPHEN 10; 325 MG/1; MG/1
1 TABLET ORAL EVERY 4 HOURS PRN
Qty: 20 TABLET | Refills: 0 | Status: SHIPPED | OUTPATIENT
Start: 2020-07-24 | End: 2020-07-29 | Stop reason: SDUPTHER

## 2020-07-24 NOTE — TELEPHONE ENCOUNTER
I called Sil Salguero at [phone] and left her a message stating that I would like to have the patient's tissue from 7/22/20 sent off for STRATA.  I asked her to contact me back at [phone].    Richar Avendaño MD  Hematology and Oncology  [facility]  Office:[phone]  Fax: [phone]

## 2020-07-24 NOTE — TELEPHONE ENCOUNTER
I called the patient and left him a message to call me back at [phone].     Richar Avendaño MD  Hematology and Oncology  [facility]  Office:[phone]  Fax: [phone]

## 2020-07-24 NOTE — TELEPHONE ENCOUNTER
I called the patient and he stated he only had 4 percocets left.  I informed him I would send in a refill for 20 pills but that he would need to see Nellie Herrera next week to discuss further management.  The patient expressed understanding.  All questions were answered to his satisfaction.    Richar Avendaño MD  Hematology and Oncology  [facility]  Office:[phone]  Fax: [phone]

## 2020-07-27 ENCOUNTER — OFFICE VISIT (OUTPATIENT)
Dept: PULMONOLOGY | Facility: CLINIC | Age: 55
End: 2020-07-27
Payer: MEDICAID

## 2020-07-27 VITALS
OXYGEN SATURATION: 100 % | SYSTOLIC BLOOD PRESSURE: 136 MMHG | WEIGHT: 167.88 LBS | HEART RATE: 100 BPM | DIASTOLIC BLOOD PRESSURE: 82 MMHG | BODY MASS INDEX: 25.44 KG/M2 | HEIGHT: 68 IN | TEMPERATURE: 97 F

## 2020-07-27 DIAGNOSIS — J39.8 TRACHEAL NODULE: ICD-10-CM

## 2020-07-27 DIAGNOSIS — C34.91 ADENOCARCINOMA, LUNG, RIGHT: ICD-10-CM

## 2020-07-27 DIAGNOSIS — J98.4 LUNG DISEASE: ICD-10-CM

## 2020-07-27 DIAGNOSIS — F17.200 SMOKER: ICD-10-CM

## 2020-07-27 PROCEDURE — 99999 PR PBB SHADOW E&M-EST. PATIENT-LVL V: ICD-10-PCS | Mod: PBBFAC,,, | Performed by: INTERNAL MEDICINE

## 2020-07-27 PROCEDURE — 99215 OFFICE O/P EST HI 40 MIN: CPT | Mod: PBBFAC | Performed by: INTERNAL MEDICINE

## 2020-07-27 PROCEDURE — 99999 PR PBB SHADOW E&M-EST. PATIENT-LVL V: CPT | Mod: PBBFAC,,, | Performed by: INTERNAL MEDICINE

## 2020-07-27 PROCEDURE — 99204 PR OFFICE/OUTPT VISIT, NEW, LEVL IV, 45-59 MIN: ICD-10-PCS | Mod: S$PBB,,, | Performed by: INTERNAL MEDICINE

## 2020-07-27 PROCEDURE — 99204 OFFICE O/P NEW MOD 45 MIN: CPT | Mod: S$PBB,,, | Performed by: INTERNAL MEDICINE

## 2020-07-27 NOTE — PROGRESS NOTES
PATIENT: Luis Felipe Hawk  MRN: [id number]  DATE: 7/29/2020      Diagnosis:   1. Adenocarcinoma, lung, right    2. Cancer related pain    3. Lung disease    4. Therapeutic opioid induced constipation    5. Essential hypertension    6. Type 2 diabetes mellitus without complication, without long-term current use of insulin    7. History of CVA (cerebrovascular accident)    8. Dyslipidemia        Chief Complaint: Follow-up (adenocarcinoma of lung)    Oncologic History:      Oncologic History 11/21/19 CXR  3/13/20 CT Chest  4/01/20 IR biopsy  4/09/20 PET/CT  5/07/20 PORT placement  7/09/20 CT Chest   7/22/20 Chest mass Biopsy  7/24/20 PET/CT    Oncologic Treatment 5/13/20 B 12 injection  5/20/20 Carboplatin/Alimta/Pembrolizumab cycle 3    Pathology 4/01/20 - adenocarcinoma suggestive of a possible gastrointestinal origin - PD-L1, ROS-1 and ALK negative  7/22/20 - moderately differentiated adenocarcinoma      Oncologic History:   The patient was initially seen by Dr Carmona on 11/21/19 for a cough and underwent a CXR showing a soft tissue mass in the anterior segment of the right upper lobe that measures at least 4 cm in cranial-caudad extent.  Per chart review an attempt to contact MR Hawk was mad on multiple occasions in order to have a CT of the chest done.  CT of the chest was eventually done on 3/13/20 and showed enlarged right paratracheal LN measuring 2.8 cm, slightly enlarged right hilar nodes, a mass within the anterior segment of the right upper lobe measuring 3.9 x 4.7 cm abutting the right suprahilar region and obliterating the anterior segmental bronchus.  The patient underwent IR guided biopsy of the right lung on 4/01/20 showing adenocarcinoma suggestive of a possible gastrointestinal origin.  MRI of the brain on 4/09/20 showed no evidence of intracranial metastatic disease, remote small infarcts within the right superior frontal lobe and remote lacunar-type infarcts of the bilateral lentiform nuclei and  right thalamus.  PET/CT on 4/09/20 showed a 6.5 x 3.7 cm pulmonary mass in the anterior segment of the right upper lobe abutting the mediastinal pleura, large right pretracheal soft tissue mass measuring 4.4 x 3.8 cm, left prevascular lymph node measuring 1.0 cm, few scattered bilateral poorly defined subcentimeter pulmonary nodules, and a right chest wall soft tissue lesion partially eroding the lateral right 3rd rib measuring 2 x 2 cm.  The patient had a PORT placed in the right chest on 5/07/20. He received his B12 injection on 5/13/20.  He also underwent US of the carotid on 5/14/20 showing 0-19% internal carotid artery stenosis on the right with a lymph node in the proximal neck lateral to the common carotid artery measuring 2.8 x 1.7 x 3.6 cm.  On the left there is 0-19% stenosis.  His PORT was placed on 5/07/20.  The patient saw Dr Bennett with vascular neurology on 5/21/20 for CVA seen on MRI of the brain 4/09/20.  Recommendations were made for ASA, statin and BP control to prevent secondary stroke.  The patient was then see in the ER on 5/22/20 with concern for PNA and treated with Levaquin.  He then saw Nellie Herrera with palliative care on 6/03/20 for symptoms management.  MS Contin 60 mg q 8 hours, Percocet 5/325 1 tablet q 4 hours prn for breakthrough pain/cough.   The patient underwent a CT of the chest on 7/09/20 showing a right paratracheal lymph node measuring 1.5 cm in short axis, previously 2.4 cm, a right upper lobe mass measuring 5.7 cm which is unchanged, patchy ground-glass opacities bilaterally, a 1.2 cm soft tissue density focus along the right posterior aspect of the trachea at the level of C7-T1, and a right chest wall mass centered at the destroyed 3rd rib measuring 6.6 cm, previously 4.3 cm.  Subjective:    Interval History: Mr. Hawk is a 54 y.o. male with HTN, HLD, DMII, CAD who presents to follow up for NSCLC adenocarcinoma.  Since the last clinic visit the patient underwent a  right chest wall mass biopsy on 7/22/20.  He underwent a PET/CT on 7/24/20 showing a right upper lobe paramediastinal mass measuring 5.8 x 3.9 cm previously 6.6 x 4.1; resolution in radiotracer uptake in lymph nodes within the right supraclavicular, right paratracheal, and prevascular stations; increased size of a hypermetabolic soft tissue mass centered within the right lateral 3rd rib measuring 6.5 x 5.7 cm previously 2.9 x 2.1 cm.  The patient states he continues to have chest pain in the right chest rated 9/10, better with pain meds.  He continues to have SOB with exertion but states symptoms have improved.  He also has improvement in cough.  The patient denies abdominal pain, N/V, constipation, diarrhea.  The patient denies fever, chills, night sweats, weight loss, new lumps or bumps, easy bruising or bleeding.    Past Medical History:   Past Medical History:   Diagnosis Date    CAD (coronary artery disease) 2013    one stent placed    DMII (diabetes mellitus, type 2)     Hypertension     Lung cancer     Mass of right lung     Mass of upper lobe of right lung 3/16/2020    3/13/2020 CT chest: Right upper lobe mass obliterating the anterior segmental bronchus. This is associated with right hilar and paramediastinal adenopathy.  This is concerning for bronchogenic carcinoma.  Sampling recommended.       Past Surgical HIstory:   Past Surgical History:   Procedure Laterality Date    CV STENT      X 1    INSERTION OF TUNNELED CENTRAL VENOUS CATHETER (CVC) WITH SUBCUTANEOUS PORT N/A 5/7/2020    Procedure: INSERTION, PORT-A-CATH;  Surgeon: Dagoberto Diagnostic Provider;  Location: [facility] OR;  Service: Radiology;  Laterality: N/A;  RN PREOP 5/7/20--rapid covid done    LUNG BIOPSY N/A 4/1/2020    Procedure: BIOPSY, LUNG;  Surgeon: Dagoberto Diagnostic Provider;  Location: [facility] OR;  Service: Radiology;  Laterality: N/A;  730AM STARTRN PHONE PREOP 3/31/2020    LUNG BIOPSY N/A 7/22/2020    Procedure: BIOPSY, LUNG;  Surgeon:  [facility] Diagnostic Provider;  Location: [facility] OR;  Service: Radiology;  Laterality: N/A;  9AM START  RN PREOP 7/21/2020--COVID NEGATIVE ON 7/21       Family History:   Family History   Problem Relation Age of Onset    Peripheral vascular disease Mother     Heart disease Father     No Known Problems Brother     No Known Problems Brother     No Known Problems Son     No Known Problems Son     No Known Problems Daughter     No Known Problems Daughter     No Known Problems Daughter        Social History:  reports that he has been smoking cigarettes. He has a 60.00 pack-year smoking history. He has never used smokeless tobacco. He reports current alcohol use of about 3.0 standard drinks of alcohol per week. He reports that he does not use drugs.    Allergies:  Review of patient's allergies indicates:   Allergen Reactions    Tramadol      Chest burning       Medications:  Current Outpatient Medications   Medication Sig Dispense Refill    acetaminophen (TYLENOL) 325 MG tablet Take 325 mg by mouth every 6 (six) hours as needed for Pain.      albuterol (PROVENTIL/VENTOLIN HFA) 90 mcg/actuation inhaler INHALE 2 PUFFS BY MOUTH EVERY 6 HOURS AS NEEDED FOR WHEEZING 18 g 11    aspirin (ECOTRIN) 81 MG EC tablet Take 1 tablet (81 mg total) by mouth once daily. 30 tablet 11    atorvastatin (LIPITOR) 80 MG tablet Take 1 tablet (80 mg total) by mouth once daily. 90 tablet 3    blood-glucose meter kit To check BG 1 times daily, to use with insurance preferred meter 1 each 0    buPROPion (WELLBUTRIN SR) 150 MG TBSR 12 hr tablet Take 1 tablet (150 mg total) by mouth 2 (two) times daily. 60 tablet 0    cyproheptadine (PERIACTIN) 4 mg tablet Take 1 tablet (4 mg total) by mouth 4 (four) times daily. 120 tablet 0    folic acid (FOLVITE) 1 MG tablet Take 1 tablet (1 mg total) by mouth once daily. 30 tablet 6    gabapentin (NEURONTIN) 600 MG tablet Take 1 tablet (600 mg total) by mouth 3 (three) times daily. 30 tablet 2     hydroCHLOROthiazide (HYDRODIURIL) 25 MG tablet TK 1 T PO ONE TIME PER DAY FOR BP 90 tablet 3    metFORMIN (GLUCOPHAGE) 500 MG tablet 1 po qAM x 1 wk; then 1 po BID x 1 wk; then 2 AM/1 PM x 1 wk; then 2 BID maintenance. Take with food 120 tablet 5    metoprolol succinate (TOPROL-XL) 25 MG 24 hr tablet Take 1 tablet (25 mg total) by mouth once daily. 90 tablet 3    naproxen (NAPROSYN) 500 MG tablet TAKE 1 TABLET(500 MG) BY MOUTH TWICE DAILY 60 tablet 11    nicotine (NICODERM CQ) 21 mg/24 hr Place 1 patch onto the skin once daily. 28 patch 0    ondansetron (ZOFRAN) 4 MG tablet Take 1 tablet (4 mg total) by mouth every 6 (six) hours as needed for Nausea. 90 tablet 6    polyethylene glycol (MIRALAX) 17 gram PwPk Take 17 g by mouth 2 (two) times daily as needed (Constipation). 30 each 6    senna-docusate 8.6-50 mg (PERICOLACE) 8.6-50 mg per tablet Take 2 tablets by mouth once daily. 60 tablet 6    morphine (MS CONTIN) 100 MG 12 hr tablet Take 1 tablet (100 mg total) by mouth every 8 (eight) hours. 90 tablet 0    nicotine polacrilex 2 MG Lozg Use 1-2 per hour in place of a cigarette. Limit to 10 a day. 144 lozenge 0    oxyCODONE-acetaminophen (PERCOCET)  mg per tablet Take 1 tablet by mouth every 4 (four) hours as needed for Pain. 90 tablet 0     No current facility-administered medications for this visit.        Review of Systems   Constitutional: Negative for appetite change, chills, diaphoresis, fatigue, fever and unexpected weight change.   Respiratory: Positive for shortness of breath (with exertion). Negative for cough.    Cardiovascular: Negative for chest pain and palpitations.   Gastrointestinal: Negative for abdominal pain, constipation, diarrhea, nausea and vomiting.   Musculoskeletal:        Pain in his lateral right chest   Skin: Negative for color change and rash.   Neurological: Negative for headaches.   Hematological: Negative for adenopathy. Does not bruise/bleed easily.       ECOG  "Performance Status: 2   Objective:      Vitals:   Vitals:    07/29/20 1321   BP: 137/82   BP Location: Left arm   Patient Position: Sitting   BP Method: Large (Automatic)   Pulse: 106   Temp: 97.7 °F (36.5 °C)   TempSrc: Temporal   SpO2: 96%   Weight: 76.7 kg (169 lb 1.5 oz)   Height: 5' 8" (1.727 m)       Physical Exam  Constitutional:       General: He is not in acute distress.     Appearance: He is well-developed. He is not diaphoretic.   HENT:      Head: Normocephalic and atraumatic.   Neck:      Musculoskeletal: Normal range of motion.   Cardiovascular:      Rate and Rhythm: Normal rate and regular rhythm.      Heart sounds: Normal heart sounds. No murmur. No friction rub. No gallop.    Pulmonary:      Effort: Pulmonary effort is normal. No respiratory distress.      Breath sounds: Normal breath sounds. No wheezing or rales.   Chest:      Chest wall: No tenderness.   Abdominal:      General: Bowel sounds are normal. There is no distension.      Palpations: Abdomen is soft. There is no mass.      Tenderness: There is no abdominal tenderness. There is no rebound.   Musculoskeletal:      Comments: PORT in right chest   Lymphadenopathy:      Cervical: No cervical adenopathy.      Upper Body:      Right upper body: No supraclavicular adenopathy.      Left upper body: No supraclavicular adenopathy.   Skin:     General: Skin is warm and dry.      Findings: No erythema or rash.   Neurological:      Mental Status: He is alert and oriented to person, place, and time.   Psychiatric:         Behavior: Behavior normal.         Laboratory Data:  No visits with results within 1 Week(s) from this visit.   Latest known visit with results is:   Admission on 07/22/2020, Discharged on 07/22/2020   Component Date Value Ref Range Status    Final Pathologic Diagnosis 07/22/2020    Final                    Value:Fragments of fibrous tissue (needle biopsies, submitted as right lung  biopsy): Moderately differentiated adenocarcinoma " "with necrosis, tumor is  morphologically similar to the patient's prior biopsy, case         number WBS-.         No pulmonary parenchyma is present         See comment.  Comment: Immunohistochemical stains are completed and show a few scattered  positive cells with CDX2, CEA, diffuse positive tumor         cell staining with cytokeratin 7.  The tumor cells are negative for  TTF -1 and cytokeratin 20.  This staining profile is identical         to the patient's previous biopsy staining profile and the tumor is  morphologically similar to the patient's prior biopsy, case         number WBS-. All stains have satisfactory positive and negative  controls.  Therefore the diagnosis remains         unchanged from the previous diagnosis.         Note:  At the request of Dr. Avendaño, the specimen will be submitted for  Strata study and for molecular         marker                          s  BRAF, EGFR, ROS-1, met Exon14, and BRAF V600E.  A separate  report of these results will follow.      Interpreted by: Jia Olivas M.D., Signed on 07/28/2020 at 09:32    Gross 07/22/2020    Final                    Value:Container Label: Surgery MRN and Pathology MRN:  [id number]  Received in RPMI solution labeled "right lung biopsy " and consists multiple  1 mm diameter tissue cores measuring in aggregate 1.0 x 0.7 x 0.1 cm.  The  specimen is filtered and wrapped. Entirely submitted in cassette  PGZ--1A, 1B.  Grossed by: Coty Walls      POCT Glucose 07/22/2020 126* 70 - 110 mg/dL Final         Imaging: CT Chest 7/09/20  Right IJ port with catheter terminating in the SVC.  No pericardial or pleural effusion.  No hilar lymphadenopathy.  Right paratracheal lymph node measures 1.5 cm in short axis, previously 2.4 cm in short axis (series 2, image 38).  There is interval heart is normal size.  Thoracic aorta is normal caliber.     Right upper lobe mass measures 5.7 cm, unchanged (series 2, image 51).  Patchy " ground-glass opacities are noted bilaterally.  There is a 1.2 cm soft tissue density focus along the right posterior aspect of the trachea at the level of C7-T1, not seen previously.  Central airways are otherwise patent.     Right chest wall mass centered at the destroyed 3rd rib measures 6.6 cm, previously 4.3 cm.  Limited evaluation of the upper abdomen is unremarkable.    PET/CT 7/24/20    Quality of the study: Adequate.     In the head and neck, there are no hypermetabolic lesions worrisome for malignancy. There are no hypermetabolic mucosal lesions, and there are no pathologically enlarged or hypermetabolic lymph nodes.     In the chest, there is a hypermetabolic right upper lobe paramediastinal mass measuring 5.8 x 3.9 cm (previously 6.6 x 4.1) with maximum SUV of 14 (previously 8.9).  Respiratory motion artifact limits fine detail assessment of the pulmonary parenchyma.     Previously identified enlarged hypermetabolic lymph nodes within the right supraclavicular, right paratracheal, and prevascular stations demonstrate normal size and normalized radiotracer uptake on today's examination.  For reference, 1.0 cm pretracheal lymph node (previously 4.4 cm) demonstrates normal radiotracer uptake (previously maximum SUV of 13).     In the abdomen and pelvis, there is physiologic tracer distribution within the abdominal organs and excretion into the genitourinary system.  Infrarenal abdominal aortic ectasia.     In the bones, there is increased size of a hypermetabolic soft tissue mass centered within the right lateral 3rd rib measuring 6.5 x 5.7 cm (previously 2.9 x 2.1 cm) with maximum SUV 24 (previously 21).        Assessment:       1. Adenocarcinoma, lung, right    2. Cancer related pain    3. Lung disease    4. Therapeutic opioid induced constipation    5. Essential hypertension    6. Type 2 diabetes mellitus without complication, without long-term current use of insulin    7. History of CVA (cerebrovascular  accident)    8. Dyslipidemia           Plan:     NSCLC - biopsy of the right lung on 4/01/20 showed adenocarcinoma  -PET/CT on 4/09/20 showed 6.5 x 3.7 cm pulmonary mass in the anterior segment of the right upper lobe abutting the mediastinal pleura, large right pretracheal soft tissue mass measuring 4.4 x 3.8 cm, left prevascular lymph node measuring 1.0 cm, few scattered bilateral poorly defined subcentimeter pulmonary nodules, and a right chest wall soft tissue lesion partially eroding the lateral right 3rd rib measuring 2 x 2 cm  -The patient received 3 cycles of Carboplatin, Pemetrexed and Pembrolizumab   -CT of the chest on 7/09/20 showed progression of the right chest wall mass now measuring 6.6cm  -Pt discussed at thoracic tumor board with no intra pulmonary intervention which could improve the patient's respiratory status  -Pt to see radiation oncology tomorrow at [facility]  -Chest mass biopsied on 7/22/20 with path sent to [facility]  -Will proceed with second line treatment with pemetrexed  -Patient was consented for chemotherapy today 7/29/2020 for Pemetrexed.   An extensive discussion was had which included a thorough discussion of the risk and benefits of treatment and alternatives.  Risks, including but not limited to, possible hair loss, bone marrow damage (anemia, thrombocytopenia, immune suppression, neutropenia), damage to body organs (brain, heart, liver, kidney, lungs, nervous system, skin, and others), allergic reactions, sterility, nausea/vomiting, constipation/diarrhea, sores in the mouth, secondary cancers, local damage at possible injection sites, and rarely death were all discussed.  The patient agrees with the plan, and all questions have been answered to their satisfaction.  Consent was signed the patient, provider, and a third party witness.    -Pt will need Vitamin B12 injection a week prior to chemo    Cancer Related pain - Pt managed by Mrs Herrera in palliative care.  -MS Contin increased  to 100mg every 8 hours  -Pt also taking percocet 10-325mg every 4 hours.    -Palliative care managing  -Will assess response to radiation therapy    Ground Glass Opacities - the patient has underlying parenchymal disease of the lung  -The patient has had multiple admissions after chemo treatment for PNA  -Dr Cortez with pulmonary recommended inhalers and PFT's  -Symptoms improved    Opioid Induced Constipation - Constipation improved  -Will monitor    HTN - pt on HCTZ and metoprolol  -Will monitor    HLD - pt on lipitor  -PCP managing    DMII - pt's A1C is 10.1 on 3/13/20  -Pt on metformin  -Will monitor    CAD -  Pt on statin, ASA, metoprolol    CVA - recent MRI of the brain on 4/09/20 showed old infarcts in the right superior frontal lobe and remote lacunar-type infarcts of the bilateral lentiform nuclei and right thalamus  -Pt already on ASA and statin  -PT saw Dr Bennett on 5/21/20 who recommended continued ASA, statin and BP control    Advance Care Planning     Power of   I initiated the process of advance care planning today and explained the importance of this process to the patient.  I introduced the concept of advance directives to the patient, as well. Then the patient received detailed information about the importance of designating a Health Care Power of  (HCPOA). He was also instructed to communicate with this person about their wishes for future healthcare, should he become sick and lose decision-making capacity. The patient has not previously appointed a HCPOA. After our discussion, the patient has decided to complete a HCPOA and will bring the form completed to his next clinic appointment.                -Follow Up - follow up with me the day he starts chemo (Pemetrexed) with CBC, CMP prior to the appt    Richar Avendaño MD  Hematology and Oncology  [facility]  Office:[phone]  Fax: [phone]

## 2020-07-27 NOTE — LETTER
July 27, 2020      Richar Avendaño MD  [address] [facility] [address]  [address]  [place] [address]           [facility]  [address]  [facility] [place] [address]  Phone: [phone]  Fax: [phone]          Patient: Luis Felipe Hawk   MR Number: [id number]   YOB: 1965   Date of Visit: 7/27/2020       Dear Dr. Richar Avendaño:    Thank you for referring Luis Felipe Hawk to me for evaluation. Attached you will find relevant portions of my assessment and plan of care.    If you have questions, please do not hesitate to call me. I look forward to following Luis Felipe Hawk along with you.    Sincerely,    Federico Cortez MD    Enclosure  CC:  No Recipients    If you would like to receive this communication electronically, please contact [email or web address] or [phone] to request more information on [employer] access.    For providers and/or their staff who would like to refer a patient to [facility], please contact us through our one-stop-shop provider referral line, [facility], at [phone].    If you feel you have received this communication in error or would no longer like to receive these types of communications, please e-mail [email or web address]

## 2020-07-27 NOTE — PROGRESS NOTES
Luis Felipe Hawk  was seen as a new patient at the request  Richar Avendaño MD for the evaluation of  Lung disease.    CHIEF COMPLAINT:  No chief complaint on file.      HISTORY OF PRESENT ILLNESS: Luis Felipe Hawk is a 54 y.o. male  has a past medical history of CAD (coronary artery disease) (2013), DMII (diabetes mellitus, type 2), Hypertension, Lung cancer, Mass of right lung, and Mass of upper lobe of right lung (3/16/2020).  Patient was diagnosed with lung cancer via ct guided biopsy of rul.  The patient underwent IR guided biopsy of the right lung on 4/01/20 showing adenocarcinoma suggestive of a possible gastrointestinal origin.  He then saw Nellie Herrera with palliative care on 6/03/20 for symptoms management.  MS Contin 60 mg q 8 hours, Percocet 5/325 1 tablet q 4 hours prn for breakthrough pain/cough.  Patient is followed by dr. Mcqueen for   Chemo.  The patient has received 3 cycles of Carboplatin, Pemetrexed and Pembrolizumab.  Repeat CT of the chest on 7/09/20 shows progression of the right chest wall mass now measuring 6.6 cm.  S/p repeat biopsy on 7/22/20 for tissue to send for STRATA.  Patient was noted to have endotracheal filling defect on ct and referred to pulmonary for further inputs.     Patient smoked up to 2 ppd since 20s.  Currently 1/2 ppd.  Patient denied with with walking flat surfact but endorsed dyspnea  with lifting or bending over since 11/19.  +weight loss.  No hemoptysis. No coughing or wheezing.  No melana or hematochezia.  No hematuria.  No dysphagia with solid or liquid.      PAST MEDICAL HISTORY:    Active Ambulatory Problems     Diagnosis Date Noted    Coronary artery disease involving native coronary artery of native heart with angina pectoris 2013 [facility] and stent mLCx 12/09/2013    Smoker 1 PPD 11/21/2019    Essential hypertension 11/21/2019    Dyslipidemia 11/21/2019    Type 2 diabetes mellitus without complication, without long-term current use of insulin 11/22/2019     Adenocarcinoma, lung, right 03/16/2020    Lung disease 04/01/2020    Adjustment disorder with mixed anxiety and depressed mood 04/24/2020    Health problem in family 04/24/2020    Preop testing 05/07/2020    Palliative care encounter 06/03/2020    Adenocarcinoma of lung, right 07/22/2020    Tracheal nodule 07/27/2020     Resolved Ambulatory Problems     Diagnosis Date Noted    Myocardial infarction 12/09/2013     Past Medical History:   Diagnosis Date    CAD (coronary artery disease) 2013    DMII (diabetes mellitus, type 2)     Hypertension     Lung cancer     Mass of right lung     Mass of upper lobe of right lung 3/16/2020                PAST SURGICAL HISTORY:    Past Surgical History:   Procedure Laterality Date    CV STENT      X 1    INSERTION OF TUNNELED CENTRAL VENOUS CATHETER (CVC) WITH SUBCUTANEOUS PORT N/A 5/7/2020    Procedure: INSERTION, PORT-A-CATH;  Surgeon: Dosc Diagnostic Provider;  Location: [facility] OR;  Service: Radiology;  Laterality: N/A;  RN PREOP 5/7/20--rapid covid done    LUNG BIOPSY N/A 4/1/2020    Procedure: BIOPSY, LUNG;  Surgeon: Dos Diagnostic Provider;  Location: [facility] OR;  Service: Radiology;  Laterality: N/A;  730AM STARTRN PHONE PREOP 3/31/2020    LUNG BIOPSY N/A 7/22/2020    Procedure: BIOPSY, LUNG;  Surgeon: Dos Diagnostic Provider;  Location: [facility] OR;  Service: Radiology;  Laterality: N/A;  9AM START  RN PREOP 7/21/2020--COVID NEGATIVE ON 7/21         FAMILY HISTORY:                Family History   Problem Relation Age of Onset    Peripheral vascular disease Mother     Heart disease Father     No Known Problems Brother     No Known Problems Brother     No Known Problems Son     No Known Problems Son     No Known Problems Daughter     No Known Problems Daughter     No Known Problems Daughter        SOCIAL HISTORY:          Tobacco:   Social History     Tobacco Use   Smoking Status Current Every Day Smoker    Packs/day: 2.00    Years: 30.00    Pack years:  60.00    Types: Cigarettes   Smokeless Tobacco Never Used     alcohol use:    Social History     Substance and Sexual Activity   Alcohol Use Yes    Alcohol/week: 3.0 standard drinks    Types: 3 Shots of liquor per week    Frequency: 2-4 times a month               Occupation:  Former     ALLERGIES:    Review of patient's allergies indicates:   Allergen Reactions    Tramadol      Chest burning       CURRENT MEDICATIONS:    Current Outpatient Medications   Medication Sig Dispense Refill    acetaminophen (TYLENOL) 325 MG tablet Take 325 mg by mouth every 6 (six) hours as needed for Pain.      albuterol (PROVENTIL/VENTOLIN HFA) 90 mcg/actuation inhaler INHALE 2 PUFFS BY MOUTH EVERY 6 HOURS AS NEEDED FOR WHEEZING 18 g 11    aspirin (ECOTRIN) 81 MG EC tablet Take 1 tablet (81 mg total) by mouth once daily. 30 tablet 11    atorvastatin (LIPITOR) 80 MG tablet Take 1 tablet (80 mg total) by mouth once daily. 90 tablet 3    blood-glucose meter kit To check BG 1 times daily, to use with insurance preferred meter 1 each 0    buPROPion (WELLBUTRIN SR) 150 MG TBSR 12 hr tablet Take 1 tablet (150 mg total) by mouth 2 (two) times daily. 60 tablet 0    cyproheptadine (PERIACTIN) 4 mg tablet Take 1 tablet (4 mg total) by mouth 4 (four) times daily. 120 tablet 0    folic acid (FOLVITE) 1 MG tablet Take 1 tablet (1 mg total) by mouth once daily. 30 tablet 6    gabapentin (NEURONTIN) 600 MG tablet Take 1 tablet (600 mg total) by mouth 3 (three) times daily. 30 tablet 2    hydroCHLOROthiazide (HYDRODIURIL) 25 MG tablet TK 1 T PO ONE TIME PER DAY FOR BP 90 tablet 3    metFORMIN (GLUCOPHAGE) 500 MG tablet 1 po qAM x 1 wk; then 1 po BID x 1 wk; then 2 AM/1 PM x 1 wk; then 2 BID maintenance. Take with food 120 tablet 5    metoprolol succinate (TOPROL-XL) 25 MG 24 hr tablet Take 1 tablet (25 mg total) by mouth once daily. 90 tablet 3    morphine (MS CONTIN) 60 MG 12 hr tablet Take 1 tablet (60 mg total) by mouth  "every 8 (eight) hours. 90 tablet 0    naproxen (NAPROSYN) 500 MG tablet TAKE 1 TABLET(500 MG) BY MOUTH TWICE DAILY 60 tablet 11    nicotine (NICODERM CQ) 21 mg/24 hr Place 1 patch onto the skin once daily. 28 patch 0    nicotine polacrilex 2 MG Lozg Use 1-2 per hour in place of a cigarette. Limit to 10 a day. 144 lozenge 0    ondansetron (ZOFRAN) 4 MG tablet Take 1 tablet (4 mg total) by mouth every 6 (six) hours as needed for Nausea. 90 tablet 6    oxyCODONE-acetaminophen (PERCOCET)  mg per tablet Take 1 tablet by mouth every 4 (four) hours as needed for Pain. 20 tablet 0    polyethylene glycol (MIRALAX) 17 gram PwPk Take 17 g by mouth 2 (two) times daily as needed (Constipation). 30 each 6    senna-docusate 8.6-50 mg (PERICOLACE) 8.6-50 mg per tablet Take 2 tablets by mouth once daily. 60 tablet 6     No current facility-administered medications for this visit.                   REVIEW OF SYSTEMS:     Pulmonary related symptoms as per HPI.  Gen:  no weight loss, no fever, no night sweat  HEENT:  no visual changes, no sore throat, no hearing loss  CV:  +chronic right sided chest pain, no orthopnea, no PND  GI:  no melena, no hematochezia, no diarhea, no constipation.  :  no dysuria, no hematuria, no hesistancy, no dribbling  Neuro:  no syncope, no vertigo, no tinitus, dizziness with rapid standing up  Psych:  No homocide or suicide ideation; no depression.  Endocrine:  No heat or cold intolerance.  Sleep:  + snoring; no witnessed apnea.  Otherwise, a balance of systems reviewed is negative.          PHYSICAL EXAM:  Vitals:    07/27/20 1305   BP: 136/82   Pulse: 100   Temp: 97 °F (36.1 °C)   TempSrc: Oral   SpO2: 100%   Weight: 76.1 kg (167 lb 14.1 oz)   Height: 5' 8" (1.727 m)   PainSc:   6   PainLoc: Chest     Body mass index is 25.53 kg/m².     GENERAL:  well develop; no apparent distress  HEENT:  no nasal congestion; no discharge noted; class 2 modified mallampatti.   NECK:  supple; no palpable " masses.  CARDIO: regular rate and rhythm  PULM:  clear to auscultation bilaterally; no intercostals retractions; no accessory muscle usage   ABDOMEN:  soft nontender/nondistended.  +bowel sound  EXTREMITIES no cce  NEURO:  CN II-XII intact.  5/5 motor in all extremities.  sensation grossly intact   to light touch.  PSYCH:  normal affect.  Alert and oriented x 4    LABS  Pulmonary Functions Testing Results(personally reviewed):  none  ABG (personally reviewed):  none  CXR (personally reviewed):  7/4/20 right basilar opacity  CT CHEST(personally reviewed):  4/09/20 showed a 6.5 x 3.7 cm pulmonary mass in the anterior segment of the right upper lobe abutting the mediastinal pleura, large right pretracheal soft tissue mass measuring 4.4 x 3.8 cm, left prevascular lymph node measuring 1.0 cm, few scattered bilateral poorly defined subcentimeter pulmonary nodules, and a right chest wall soft tissue lesion partially eroding the lateral right 3rd rib measuring 2 x 2 cm.    7/9/20 ct chest (personally reviewed)  1. Stable appearance of right upper lobe mass with interval enlargement of right chest wall mass and interval improvement in mediastinal lymphadenopathy.  2. 1.2 cm soft tissue density focus along the right posterior aspect of the trachea, possibly aspirated food material or less likely, polypoid mass.  3. Patchy bilateral ground-glass opacities, similar to prior study.    7/24/20 pet (personally reviewed) no hypermetabolic lesion in trachea.  Slightly decreased size of a hypermetabolic right upper lobe mass representing patient's known primary lung cancer.  Decreased size and normalized uptake within prior hypermetabolic right supraclavicular and mediastinal index lymph nodes.  Markedly increased size of a right lateral 3rd rib hypermetabolic soft tissue mass.  No new hypermetabolic tumor identified.    ASSESSMENT/PLAN  Problem List Items Addressed This Visit     Adenocarcinoma, lung, right    Overview     3/13/2020  CT chest: Right upper lobe mass obliterating the anterior segmental bronchus.  S/p ct guided biopsy with adenocarcinoma with mets.  Follow by Dr. Avendaño.  S/p chemo.  Await STRATA result.           Lung disease    Overview     Emphysematous changes seen of ct of chest.  In addition, bilateral ggo appear to follow mosaic attenuation.  Finding suggestive of small airway disease.  Baseline pft.  Clinically asymptomatic in term of coughing or wheezing.           Current Assessment & Plan     Continue with prn albuterol.           Relevant Orders    Complete PFT with bronchodilator    COVID-19 Routine Screening    Smoker 1 PPD    Overview      encourage smoking cessation.  Follow up with smoking cessation.  No coughing or wheezing.  Emphysematous changes on ct chest.  Baseline pft.          Tracheal nodule    Overview       Tracheal filling defect - observed on ct 7/9/20.  No present of pet scan 7/24/20.  Suspect retained mucous secretion vs aspirated food particle.                   Patient will No follow-ups on file. with md/np.    CC: Send copy of this note to Richar Avendaño MD

## 2020-07-28 LAB
FINAL PATHOLOGIC DIAGNOSIS: NORMAL
GROSS: NORMAL

## 2020-07-28 NOTE — PLAN OF CARE
DISCONTINUE ON PATHWAY REGIMEN - Non-Small Cell Lung    [id number]        Pembrolizumab (Keytruda)       Pemetrexed (Alimta)       Carboplatin (Paraplatin)           Additional Orders: Begin folic acid and vitamin B12 supplementation, and   dexamethasone prior to initiation of pemetrexed - see PI for details. In   studies, patients received up to 4 cycles of pembrolizumab + pemetrexed +   carboplatin followed by pembrolizumab up to a total of 35 cycles and pemetrexed   indefinitely until disease progression or toxicity. Serious immune-mediated   adverse events can occur with pembrolizumab. Please monitor your patient and   refer to the linked immune-mediated adverse reaction management materials for   more information.    **Always confirm dose/schedule in your pharmacy ordering system**    REASON: Disease Progression  PRIOR TREATMENT: [id number]: Pembrolizumab 200 mg + Pemetrexed 500 mg/m2 +   Carboplatin AUC=5 q21 Days x 4-6 Cycles  TREATMENT RESPONSE: Partial Response ([place])    START ON PATHWAY REGIMEN - Non-Small Cell Lung    [id number]        Pemetrexed (Alimta)           Additional Orders: Note: Patient to receive the following prior to the   initiation of therapy:  1) Dexamethasone 4 mg orally twice daily x 6 doses.    First dose 24 hours before chemotherapy.  2) Folic acid ? 400 mcg orally daily.    First dose at least 5 days prior to the first dose of pemetrexed.  3) Vitamin   B12 1,000 mcg intramuscularly every 9 weeks.  First dose at least 5 days prior   to the first dose of pemetrexed.    **Always confirm dose/schedule in your pharmacy ordering system**    Patient Characteristics:  Stage IV Metastatic, Nonsquamous, Second Line - Chemotherapy/Immunotherapy, PS =   0, 1, No Prior PD-1/PD-L1  Inhibitor or Prior PD-1/PD-L1 Inhibitor +   Chemotherapy, and Not a Candidate for Immunotherapy  AJCC T Category: T3  Current Disease Status: Distant Metastases  AJCC N Category: N3  AJCC M Category: M1a  AJCC 8 Stage Grouping:  EMELINA  Histology: Nonsquamous Cell  ROS1 Rearrangement Status: Negative  T790M Mutation Status: Not Applicable - EGFR Mutation Negative/Unknown  Other Mutations/Biomarkers: No Other Actionable Mutations  NTRK Gene Fusion Status: Negative  PD-L1 Expression Status: PD-L1 Negative  Chemotherapy/Immunotherapy LOT: Second Line Chemotherapy/Immunotherapy  Molecular Targeted Therapy: Not Appropriate  ALK Rearrangement Status: Negative  EGFR Mutation Status: Negative/Wild Type  BRAF V600E Mutation Status: Negative  ECOG Performance Status: 0  Immunotherapy Candidate Status: Not a Candidate for Immunotherapy  Prior Immunotherapy Status: Prior PD-1/PD-L1 Inhibitor + Chemotherapy  Intent of Therapy:  Non-Curative / Palliative Intent, Not Discussed with Patient

## 2020-07-29 ENCOUNTER — OFFICE VISIT (OUTPATIENT)
Dept: HEMATOLOGY/ONCOLOGY | Facility: CLINIC | Age: 55
End: 2020-07-29
Payer: MEDICAID

## 2020-07-29 ENCOUNTER — OFFICE VISIT (OUTPATIENT)
Dept: PALLIATIVE MEDICINE | Facility: CLINIC | Age: 55
End: 2020-07-29
Payer: MEDICAID

## 2020-07-29 ENCOUNTER — PATIENT OUTREACH (OUTPATIENT)
Dept: ADMINISTRATIVE | Facility: OTHER | Age: 55
End: 2020-07-29

## 2020-07-29 VITALS
HEART RATE: 106 BPM | BODY MASS INDEX: 25.62 KG/M2 | BODY MASS INDEX: 25.62 KG/M2 | TEMPERATURE: 98 F | SYSTOLIC BLOOD PRESSURE: 137 MMHG | DIASTOLIC BLOOD PRESSURE: 82 MMHG | WEIGHT: 169.06 LBS | DIASTOLIC BLOOD PRESSURE: 82 MMHG | TEMPERATURE: 98 F | HEART RATE: 106 BPM | HEIGHT: 68 IN | HEIGHT: 68 IN | OXYGEN SATURATION: 96 % | SYSTOLIC BLOOD PRESSURE: 137 MMHG | WEIGHT: 169.06 LBS | OXYGEN SATURATION: 98 %

## 2020-07-29 DIAGNOSIS — R52 PAIN MANAGEMENT: Primary | ICD-10-CM

## 2020-07-29 DIAGNOSIS — J98.4 LUNG DISEASE: ICD-10-CM

## 2020-07-29 DIAGNOSIS — Z86.73 HISTORY OF CVA (CEREBROVASCULAR ACCIDENT): ICD-10-CM

## 2020-07-29 DIAGNOSIS — C34.91 ADENOCARCINOMA, LUNG, RIGHT: Primary | ICD-10-CM

## 2020-07-29 DIAGNOSIS — K59.03 THERAPEUTIC OPIOID INDUCED CONSTIPATION: ICD-10-CM

## 2020-07-29 DIAGNOSIS — E78.5 DYSLIPIDEMIA: ICD-10-CM

## 2020-07-29 DIAGNOSIS — G89.3 CANCER RELATED PAIN: ICD-10-CM

## 2020-07-29 DIAGNOSIS — E11.9 TYPE 2 DIABETES MELLITUS WITHOUT COMPLICATION, WITHOUT LONG-TERM CURRENT USE OF INSULIN: ICD-10-CM

## 2020-07-29 DIAGNOSIS — C34.91 ADENOCARCINOMA, LUNG, RIGHT: ICD-10-CM

## 2020-07-29 DIAGNOSIS — T40.2X5A THERAPEUTIC OPIOID INDUCED CONSTIPATION: ICD-10-CM

## 2020-07-29 DIAGNOSIS — I10 ESSENTIAL HYPERTENSION: ICD-10-CM

## 2020-07-29 PROCEDURE — 99999 PR PBB SHADOW E&M-EST. PATIENT-LVL III: CPT | Mod: PBBFAC,,, | Performed by: NURSE PRACTITIONER

## 2020-07-29 PROCEDURE — 99999 PR PBB SHADOW E&M-EST. PATIENT-LVL V: ICD-10-PCS | Mod: PBBFAC,,, | Performed by: INTERNAL MEDICINE

## 2020-07-29 PROCEDURE — 99999 PR PBB SHADOW E&M-EST. PATIENT-LVL III: ICD-10-PCS | Mod: PBBFAC,,, | Performed by: NURSE PRACTITIONER

## 2020-07-29 PROCEDURE — 99214 PR OFFICE/OUTPT VISIT, EST, LEVL IV, 30-39 MIN: ICD-10-PCS | Mod: S$PBB,,, | Performed by: NURSE PRACTITIONER

## 2020-07-29 PROCEDURE — 99214 OFFICE O/P EST MOD 30 MIN: CPT | Mod: S$PBB,,, | Performed by: INTERNAL MEDICINE

## 2020-07-29 PROCEDURE — 2024F 7 FLD RTA PHOTO EVC RTNOPTHY: CPT | Mod: ,,, | Performed by: INTERNAL MEDICINE

## 2020-07-29 PROCEDURE — 99213 OFFICE O/P EST LOW 20 MIN: CPT | Mod: PBBFAC,27 | Performed by: NURSE PRACTITIONER

## 2020-07-29 PROCEDURE — 99214 PR OFFICE/OUTPT VISIT, EST, LEVL IV, 30-39 MIN: ICD-10-PCS | Mod: S$PBB,,, | Performed by: INTERNAL MEDICINE

## 2020-07-29 PROCEDURE — 99999 PR PBB SHADOW E&M-EST. PATIENT-LVL V: CPT | Mod: PBBFAC,,, | Performed by: INTERNAL MEDICINE

## 2020-07-29 PROCEDURE — 99214 OFFICE O/P EST MOD 30 MIN: CPT | Mod: S$PBB,,, | Performed by: NURSE PRACTITIONER

## 2020-07-29 PROCEDURE — 99215 OFFICE O/P EST HI 40 MIN: CPT | Mod: PBBFAC | Performed by: INTERNAL MEDICINE

## 2020-07-29 PROCEDURE — 2024F PR 7 FIELD PHOTOS WITH INTERP/ REVIEW: ICD-10-PCS | Mod: ,,, | Performed by: INTERNAL MEDICINE

## 2020-07-29 RX ORDER — MORPHINE SULFATE 30 MG/1
90 TABLET, FILM COATED, EXTENDED RELEASE ORAL 2 TIMES DAILY
Qty: 120 TABLET | Refills: 0 | Status: SHIPPED | OUTPATIENT
Start: 2020-07-29 | End: 2020-07-29 | Stop reason: CLARIF

## 2020-07-29 RX ORDER — MORPHINE SULFATE 30 MG/1
90 TABLET, FILM COATED, EXTENDED RELEASE ORAL EVERY 8 HOURS
Qty: 180 TABLET | Refills: 0 | Status: SHIPPED | OUTPATIENT
Start: 2020-07-29 | End: 2020-07-29 | Stop reason: DRUGHIGH

## 2020-07-29 RX ORDER — MORPHINE SULFATE 100 MG/1
100 TABLET, FILM COATED, EXTENDED RELEASE ORAL EVERY 8 HOURS
Qty: 90 TABLET | Refills: 0 | Status: SHIPPED | OUTPATIENT
Start: 2020-07-29 | End: 2020-09-17 | Stop reason: SDUPTHER

## 2020-07-29 RX ORDER — OXYCODONE AND ACETAMINOPHEN 10; 325 MG/1; MG/1
1 TABLET ORAL EVERY 4 HOURS PRN
Qty: 90 TABLET | Refills: 0 | OUTPATIENT
Start: 2020-07-29 | End: 2020-08-23

## 2020-07-29 NOTE — PROGRESS NOTES
Clinic Progress Note  Palliative Care      Reason for Consult: Pain and symptom managment             SUBJECTIVE:     History of Present Illness:  Luis Felipe Hawk is a 54 y.o. male  who presents to the clinic today for f/u with pain and symptoms. His girlfriend/HPOA is present also. He reports his pain is currently a 9 in his right arm and right side and took a pain pill during appt. That he brought with him. He has difficulty raising his right arm due to pain. He is currently on MS Contin 60 mg every 8 hours and Perccoet 10 mg q 4 hours prn. He ran out of medication early due to he is taking more than prescribed due to increased pain. He called and was unable to reach me so he called his oncologist who prescribed 20 pills to get him to his appointment with me today.      Discussed his daily pain regimen. He reports he takes MS Contin 60 mg every 8 hours 6 am, 2 pm and 6 pm, and almost every 4 hours he is requiring Percocet. His girlfriend states at times he would take 2 Percocet at one time and occasionally took an extra LA Morphine. He reports his pain will decrease to a zero when he is still and not moving which is primarily at night. He is currently taking approximately  daily but possibly more with the extra pills he took.         Past Medical History:  Past Medical History:   Diagnosis Date    CAD (coronary artery disease) 2013    one stent placed    DMII (diabetes mellitus, type 2)     Hypertension     Lung cancer     Mass of right lung     Mass of upper lobe of right lung 3/16/2020    3/13/2020 CT chest: Right upper lobe mass obliterating the anterior segmental bronchus. This is associated with right hilar and paramediastinal adenopathy.  This is concerning for bronchogenic carcinoma.  Sampling recommended.       Past Surgical History:  Past Surgical History:   Procedure Laterality Date    CV STENT      X 1    INSERTION OF TUNNELED CENTRAL VENOUS CATHETER (CVC) WITH SUBCUTANEOUS PORT N/A  5/7/2020    Procedure: INSERTION, PORT-A-CATH;  Surgeon: Dosc Diagnostic Provider;  Location: [facility] OR;  Service: Radiology;  Laterality: N/A;  RN PREOP 5/7/20--rapid covid done    LUNG BIOPSY N/A 4/1/2020    Procedure: BIOPSY, LUNG;  Surgeon: Dosc Diagnostic Provider;  Location: [facility] OR;  Service: Radiology;  Laterality: N/A;  730AM STARTRN PHONE PREOP 3/31/2020    LUNG BIOPSY N/A 7/22/2020    Procedure: BIOPSY, LUNG;  Surgeon: Dos Diagnostic Provider;  Location: [facility] OR;  Service: Radiology;  Laterality: N/A;  9AM START  RN PREOP 7/21/2020--COVID NEGATIVE ON 7/21       Social History:  Social History     Socioeconomic History    Marital status: Single     Spouse name: Not on file    Number of children: Not on file    Years of education: Not on file    Highest education level: Not on file   Occupational History    Occupation:      Employer: [employer]   Social Needs    Financial resource strain: Not on file    Food insecurity     Worry: Not on file     Inability: Not on file    Transportation needs     Medical: Not on file     Non-medical: Not on file   Tobacco Use    Smoking status: Current Every Day Smoker     Packs/day: 2.00     Years: 30.00     Pack years: 60.00     Types: Cigarettes    Smokeless tobacco: Never Used   Substance and Sexual Activity    Alcohol use: Yes     Alcohol/week: 3.0 standard drinks     Types: 3 Shots of liquor per week     Frequency: 2-4 times a month    Drug use: No    Sexual activity: Yes     Partners: Female   Lifestyle    Physical activity     Days per week: Not on file     Minutes per session: Not on file    Stress: Not on file   Relationships    Social connections     Talks on phone: Not on file     Gets together: Not on file     Attends Mosque service: Not on file     Active member of club or organization: Not on file     Attends meetings of clubs or organizations: Not on file     Relationship status: Not on file   Other Topics Concern     Not on file   Social History Narrative    Not on file       Family History:  Family History   Problem Relation Age of Onset    Peripheral vascular disease Mother     Heart disease Father     No Known Problems Brother     No Known Problems Brother     No Known Problems Son     No Known Problems Son     No Known Problems Daughter     No Known Problems Daughter     No Known Problems Daughter        Allergies and Medications (updated and reviewed):  Review of patient's allergies indicates:   Allergen Reactions    Tramadol      Chest burning     Medication List with Changes/Refills   New Medications    MORPHINE (MS CONTIN) 30 MG 12 HR TABLET    Take 3 tablets (90 mg total) by mouth every 8 (eight) hours.   Current Medications    ACETAMINOPHEN (TYLENOL) 325 MG TABLET    Take 325 mg by mouth every 6 (six) hours as needed for Pain.    ALBUTEROL (PROVENTIL/VENTOLIN HFA) 90 MCG/ACTUATION INHALER    INHALE 2 PUFFS BY MOUTH EVERY 6 HOURS AS NEEDED FOR WHEEZING    ASPIRIN (ECOTRIN) 81 MG EC TABLET    Take 1 tablet (81 mg total) by mouth once daily.    ATORVASTATIN (LIPITOR) 80 MG TABLET    Take 1 tablet (80 mg total) by mouth once daily.    BLOOD-GLUCOSE METER KIT    To check BG 1 times daily, to use with insurance preferred meter    BUPROPION (WELLBUTRIN SR) 150 MG TBSR 12 HR TABLET    Take 1 tablet (150 mg total) by mouth 2 (two) times daily.    CYPROHEPTADINE (PERIACTIN) 4 MG TABLET    Take 1 tablet (4 mg total) by mouth 4 (four) times daily.    FOLIC ACID (FOLVITE) 1 MG TABLET    Take 1 tablet (1 mg total) by mouth once daily.    GABAPENTIN (NEURONTIN) 600 MG TABLET    Take 1 tablet (600 mg total) by mouth 3 (three) times daily.    HYDROCHLOROTHIAZIDE (HYDRODIURIL) 25 MG TABLET    TK 1 T PO ONE TIME PER DAY FOR BP    METFORMIN (GLUCOPHAGE) 500 MG TABLET    1 po qAM x 1 wk; then 1 po BID x 1 wk; then 2 AM/1 PM x 1 wk; then 2 BID maintenance. Take with food    METOPROLOL SUCCINATE (TOPROL-XL) 25 MG 24 HR TABLET     Take 1 tablet (25 mg total) by mouth once daily.    NAPROXEN (NAPROSYN) 500 MG TABLET    TAKE 1 TABLET(500 MG) BY MOUTH TWICE DAILY    NICOTINE (NICODERM CQ) 21 MG/24 HR    Place 1 patch onto the skin once daily.    NICOTINE POLACRILEX 2 MG LOZG    Use 1-2 per hour in place of a cigarette. Limit to 10 a day.    ONDANSETRON (ZOFRAN) 4 MG TABLET    Take 1 tablet (4 mg total) by mouth every 6 (six) hours as needed for Nausea.    POLYETHYLENE GLYCOL (MIRALAX) 17 GRAM PWPK    Take 17 g by mouth 2 (two) times daily as needed (Constipation).    SENNA-DOCUSATE 8.6-50 MG (PERICOLACE) 8.6-50 MG PER TABLET    Take 2 tablets by mouth once daily.   Changed and/or Refilled Medications    Modified Medication Previous Medication    OXYCODONE-ACETAMINOPHEN (PERCOCET)  MG PER TABLET oxyCODONE-acetaminophen (PERCOCET)  mg per tablet       Take 1 tablet by mouth every 4 (four) hours as needed for Pain.    Take 1 tablet by mouth every 4 (four) hours as needed for Pain.   Discontinued Medications    MORPHINE (MS CONTIN) 60 MG 12 HR TABLET    Take 1 tablet (60 mg total) by mouth every 8 (eight) hours.               OBJECTIVE:   Symptom Assessment (ESAS 0-10 scale)     ESAS 0 1 2 3 4 5 6 7 8 9 10   Pain          x    Dyspnea x             Anxiety x             Nausea x             Depression  x             Anorexia x             Fatigue x             Insomnia x             Restlessness  x             Agitation     x               Physical Exam:  Vitals:    07/29/20 1342   BP: 137/82   Pulse: 106   Temp: 97.7 °F (36.5 °C)     (11)  Body mass index is 25.71 kg/m².      Physical Exam   Constitutional: He is oriented to person, place, and time. He appears ill.   Eyes: Pupils are equal, round, and reactive to light.   Cardiovascular: Normal rate, regular rhythm and normal heart sounds.   Pulmonary/Chest: Effort normal. No respiratory distress.   Abdominal: Soft. Bowel sounds are normal.   Musculoskeletal:      Right lower leg: No  edema.      Left lower leg: No edema.      Comments: RUE decreased ROM due to pain   Neurological: He is alert and oriented to person, place, and time.   Skin: Skin is warm and dry.   Psychiatric: Mood normal.   Nursing note and vitals reviewed.          Decision-Making Capacity:  Patient answered questions    Advanced Directives:  Living Will: No  LaPOST: No  Medical Power of : Yes. In EPIC     Living Arrangements: Lives with friend       Bowel Management Plan (BMP): Yes    Comments: Takes Miralax daily. He is not taking prescribed senna-s . BM every 4-6 days but does not feel constipated. Instructed to take Miralax BID    Pain Assessment: Location: right arm, right chest area    OME in 24 hours: > 270    Performance Status: 70    ECOG Performance Status Grade: 2 - Ambulates, capable of self care only          Plan/Recommendations:    1. Adenocarcinoma, lung, right, Cancer related pain    - oxyCODONE-acetaminophen (PERCOCET)  mg per tablet; Take 1 tablet by mouth every 4 (four) hours as needed for Pain.  Dispense: 90 tablet; Refill: 0  - morphine (MS CONTIN) 30 MG 12 hr tablet; Take 3 tablets (90 mg total) by mouth every 8 (eight) hours.  Dispense: 180 tablet; Refill: 0  patient meeting with XRT oncology tomorrow to discuss XRT tx for pain    -constipation: continue Miralax and increase BID prn. Recommended to start Senna S if continued problems    > 50% of 40 min visit spent in chart review, face to face discussion of goals of care,  symptom assessment, coordination of care and emotional support.

## 2020-07-29 NOTE — PROGRESS NOTES
Requested updates within Care Everywhere.  Patient's chart was reviewed for overdue ESTEFANI topics.  Immunizations reconciled.

## 2020-07-29 NOTE — Clinical Note
The patient will need to be set up for B12 injection next week, a week prior to starting chemo.  He will need approval for Pemetrexed.  He will need a follow up with me the day he starts chemo with CBC, CMP prior to the appt.

## 2020-08-04 ENCOUNTER — TELEPHONE (OUTPATIENT)
Dept: SMOKING CESSATION | Facility: CLINIC | Age: 55
End: 2020-08-04

## 2020-08-04 NOTE — TELEPHONE ENCOUNTER
[facility]- called patient  for  telephonic appointment. Left message to call back to reschedule [phone] or  [phone].

## 2020-08-05 ENCOUNTER — TELEPHONE (OUTPATIENT)
Dept: PULMONOLOGY | Facility: CLINIC | Age: 55
End: 2020-08-05

## 2020-08-07 ENCOUNTER — TELEPHONE (OUTPATIENT)
Dept: HEMATOLOGY/ONCOLOGY | Facility: CLINIC | Age: 55
End: 2020-08-07

## 2020-08-07 NOTE — TELEPHONE ENCOUNTER
I called the patient and let him know that he needed to reschedule his radiation oncology appts in order to receive radiation.  We were planning on restarting his chemo once radiation was completed.  The patient had missed several appts with rad onc.    Richar Avendaño MD  Hematology and Oncology  [facility]  Office:[phone]  Fax: [phone]

## 2020-08-07 NOTE — TELEPHONE ENCOUNTER
Ms Kris called about the patient with his missed appointments. She states that she wanted to reschedule them. I just want to make sure that he just have to rescheduled for radiation at this time.

## 2020-08-11 ENCOUNTER — TELEPHONE (OUTPATIENT)
Dept: HEMATOLOGY/ONCOLOGY | Facility: CLINIC | Age: 55
End: 2020-08-11

## 2020-08-11 DIAGNOSIS — C34.91 ADENOCARCINOMA, LUNG, RIGHT: Primary | ICD-10-CM

## 2020-08-11 NOTE — TELEPHONE ENCOUNTER
I called the patient and left him a message to call me back at [phone].     Richar Avendaño MD  Hematology and Oncology  [facility]  Office:[phone]  Fax: [phone]

## 2020-08-17 ENCOUNTER — LAB VISIT (OUTPATIENT)
Dept: LAB | Facility: HOSPITAL | Age: 55
End: 2020-08-17
Attending: INTERNAL MEDICINE
Payer: MEDICAID

## 2020-08-17 DIAGNOSIS — C34.91 ADENOCARCINOMA, LUNG, RIGHT: ICD-10-CM

## 2020-08-17 LAB
ALBUMIN SERPL BCP-MCNC: 2.7 G/DL (ref 3.5–5.2)
ALP SERPL-CCNC: 101 U/L (ref 55–135)
ALT SERPL W/O P-5'-P-CCNC: 7 U/L (ref 10–44)
ANION GAP SERPL CALC-SCNC: 10 MMOL/L (ref 8–16)
AST SERPL-CCNC: 15 U/L (ref 10–40)
BASOPHILS # BLD AUTO: 0.02 K/UL (ref 0–0.2)
BASOPHILS NFR BLD: 0.2 % (ref 0–1.9)
BILIRUB SERPL-MCNC: 0.3 MG/DL (ref 0.1–1)
BUN SERPL-MCNC: 13 MG/DL (ref 6–20)
CALCIUM SERPL-MCNC: 8.6 MG/DL (ref 8.7–10.5)
CHLORIDE SERPL-SCNC: 96 MMOL/L (ref 95–110)
CO2 SERPL-SCNC: 30 MMOL/L (ref 23–29)
CREAT SERPL-MCNC: 0.8 MG/DL (ref 0.5–1.4)
DIFFERENTIAL METHOD: ABNORMAL
EOSINOPHIL # BLD AUTO: 0.1 K/UL (ref 0–0.5)
EOSINOPHIL NFR BLD: 0.6 % (ref 0–8)
ERYTHROCYTE [DISTWIDTH] IN BLOOD BY AUTOMATED COUNT: 18.5 % (ref 11.5–14.5)
EST. GFR  (AFRICAN AMERICAN): >60 ML/MIN/1.73 M^2
EST. GFR  (NON AFRICAN AMERICAN): >60 ML/MIN/1.73 M^2
GLUCOSE SERPL-MCNC: 131 MG/DL (ref 70–110)
HCT VFR BLD AUTO: 36.3 % (ref 40–54)
HGB BLD-MCNC: 11.5 G/DL (ref 14–18)
IMM GRANULOCYTES # BLD AUTO: 0.02 K/UL (ref 0–0.04)
IMM GRANULOCYTES NFR BLD AUTO: 0.2 % (ref 0–0.5)
LYMPHOCYTES # BLD AUTO: 2.6 K/UL (ref 1–4.8)
LYMPHOCYTES NFR BLD: 32.1 % (ref 18–48)
MCH RBC QN AUTO: 27.8 PG (ref 27–31)
MCHC RBC AUTO-ENTMCNC: 31.7 G/DL (ref 32–36)
MCV RBC AUTO: 88 FL (ref 82–98)
MONOCYTES # BLD AUTO: 1 K/UL (ref 0.3–1)
MONOCYTES NFR BLD: 12.2 % (ref 4–15)
NEUTROPHILS # BLD AUTO: 4.4 K/UL (ref 1.8–7.7)
NEUTROPHILS NFR BLD: 54.7 % (ref 38–73)
NRBC BLD-RTO: 0 /100 WBC
PLATELET # BLD AUTO: 343 K/UL (ref 150–350)
PMV BLD AUTO: 8.9 FL (ref 9.2–12.9)
POTASSIUM SERPL-SCNC: 3.3 MMOL/L (ref 3.5–5.1)
PROT SERPL-MCNC: 6.6 G/DL (ref 6–8.4)
RBC # BLD AUTO: 4.13 M/UL (ref 4.6–6.2)
SODIUM SERPL-SCNC: 136 MMOL/L (ref 136–145)
WBC # BLD AUTO: 8.06 K/UL (ref 3.9–12.7)

## 2020-08-17 PROCEDURE — 85025 COMPLETE CBC W/AUTO DIFF WBC: CPT

## 2020-08-17 PROCEDURE — 36415 COLL VENOUS BLD VENIPUNCTURE: CPT

## 2020-08-17 PROCEDURE — 80053 COMPREHEN METABOLIC PANEL: CPT

## 2020-08-18 ENCOUNTER — TELEPHONE (OUTPATIENT)
Dept: HEMATOLOGY/ONCOLOGY | Facility: HOSPITAL | Age: 55
End: 2020-08-18

## 2020-08-18 DIAGNOSIS — C34.91 ADENOCARCINOMA, LUNG, RIGHT: Primary | ICD-10-CM

## 2020-08-18 NOTE — TELEPHONE ENCOUNTER
I spoke to the patient and he stated that he is getting radiation at [facility] starting this weekand will receive 10 days of treatment.  I informed him that we would postpone his chemo until he has finished radiation.  We discussed that he needs vitamin B12 a week prior to chemo.  He will get Vitamin B12 injection on 8/24/20 and start chemo on 8/31/20 with clinic appt with me that morning.  The patient expressed understanding.  All questions were answered to his satisfaction.    Richar Avendaño MD  Hematology and Oncology  [facility]  Office:[phone]  Fax: [phone]

## 2020-08-19 ENCOUNTER — TELEPHONE (OUTPATIENT)
Dept: HEMATOLOGY/ONCOLOGY | Facility: CLINIC | Age: 55
End: 2020-08-19

## 2020-08-19 NOTE — TELEPHONE ENCOUNTER
Traci returning Dr. Avendaño call from yesterday. She states patient started radiation on Monday August 17. She said his last round of radiation will be Friday August 28.

## 2020-08-20 ENCOUNTER — CLINICAL SUPPORT (OUTPATIENT)
Dept: SMOKING CESSATION | Facility: CLINIC | Age: 55
End: 2020-08-20
Payer: COMMERCIAL

## 2020-08-20 DIAGNOSIS — F17.200 NICOTINE DEPENDENCE: Primary | ICD-10-CM

## 2020-08-20 PROCEDURE — 99407 PR TOBACCO USE CESSATION INTENSIVE >10 MINUTES: ICD-10-PCS | Mod: S$GLB,,, | Performed by: INTERNAL MEDICINE

## 2020-08-20 PROCEDURE — 99407 BEHAV CHNG SMOKING > 10 MIN: CPT | Mod: S$GLB,,, | Performed by: INTERNAL MEDICINE

## 2020-08-20 NOTE — PROGRESS NOTES
Spoke with patient today in regard to smoking cessation progress for 3 month telephone follow up, he states not tobacco free. Patient has scheduled an appointment to return to the program for Quit attempt #2 on 8/24/2020 @ 5:00 pm via telephone due to the COVID-19 pandemic. Informed patient of benefit period, future follow ups, and contact information if any further help or support is needed. Will resolve episode and complete smart form for Quit attempt #1.

## 2020-08-23 ENCOUNTER — HOSPITAL ENCOUNTER (EMERGENCY)
Facility: HOSPITAL | Age: 55
Discharge: HOME | End: 2020-08-23
Attending: EMERGENCY MEDICINE
Payer: MEDICAID

## 2020-08-23 VITALS
HEART RATE: 96 BPM | DIASTOLIC BLOOD PRESSURE: 58 MMHG | SYSTOLIC BLOOD PRESSURE: 103 MMHG | BODY MASS INDEX: 24.25 KG/M2 | OXYGEN SATURATION: 98 % | WEIGHT: 160 LBS | TEMPERATURE: 99 F | HEIGHT: 68 IN | RESPIRATION RATE: 16 BRPM

## 2020-08-23 DIAGNOSIS — Z76.0 MEDICATION REFILL: Primary | ICD-10-CM

## 2020-08-23 DIAGNOSIS — G89.3 CANCER RELATED PAIN: ICD-10-CM

## 2020-08-23 DIAGNOSIS — R52 PAIN MANAGEMENT: ICD-10-CM

## 2020-08-23 PROCEDURE — 99283 EMERGENCY DEPT VISIT LOW MDM: CPT

## 2020-08-23 PROCEDURE — 25000003 PHARM REV CODE 250: Performed by: PHYSICIAN ASSISTANT

## 2020-08-23 RX ORDER — OXYCODONE AND ACETAMINOPHEN 10; 325 MG/1; MG/1
1 TABLET ORAL
Status: COMPLETED | OUTPATIENT
Start: 2020-08-23 | End: 2020-08-23

## 2020-08-23 RX ORDER — OXYCODONE AND ACETAMINOPHEN 10; 325 MG/1; MG/1
1 TABLET ORAL EVERY 4 HOURS PRN
Qty: 18 TABLET | Refills: 0 | Status: SHIPPED | OUTPATIENT
Start: 2020-08-23 | End: 2020-08-31 | Stop reason: SDUPTHER

## 2020-08-23 RX ADMIN — OXYCODONE HYDROCHLORIDE AND ACETAMINOPHEN 1 TABLET: 10; 325 TABLET ORAL at 10:08

## 2020-08-23 NOTE — ED TRIAGE NOTES
The patient presents to the ED via personal transportation alone. The patient reports that he is being tx for lung cancer and is he needs a refill for pain medications , MS Contin and Percocet. Patient reports pain to right shoulder/upper right back. Last pain medication was taken yesterday. Patient denies sob, cough, fevers, chills.
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
## 2020-08-24 ENCOUNTER — TELEPHONE (OUTPATIENT)
Dept: SMOKING CESSATION | Facility: CLINIC | Age: 55
End: 2020-08-24

## 2020-08-25 NOTE — PLAN OF CARE
DISCONTINUE ON PATHWAY REGIMEN - Non-Small Cell Lung    [id number]        Pemetrexed (Alimta)           Additional Orders: Note: Patient to receive the following prior to the   initiation of therapy:  1) Dexamethasone 4 mg orally twice daily x 6 doses.    First dose 24 hours before chemotherapy.  2) Folic acid ? 400 mcg orally daily.    First dose at least 5 days prior to the first dose of pemetrexed.  3) Vitamin   B12 1,000 mcg intramuscularly every 9 weeks.  First dose at least 5 days prior   to the first dose of pemetrexed.    **Always confirm dose/schedule in your pharmacy ordering system**    REASON: Other Reason  PRIOR TREATMENT: [id number]: Pemetrexed 500 mg/m2 q21 Days Until Progression or   Unacceptable Toxicity  TREATMENT RESPONSE: Unable to Evaluate    START ON PATHWAY REGIMEN - Non-Small Cell Lung    [id number]        Ramucirumab (Cyramza)       Docetaxel (Taxotere)           Additional Orders: Premedicate with dexamethasone 8 mg PO BID for three   days beginning 1 day prior to therapy.    **Always confirm dose/schedule in your pharmacy ordering system**    Patient Characteristics:  Stage IV Metastatic, Nonsquamous, Second Line - Chemotherapy/Immunotherapy, PS =   0, 1, No Prior PD-1/PD-L1  Inhibitor or Prior PD-1/PD-L1 Inhibitor +   Chemotherapy, and Not a Candidate for Immunotherapy  AJCC T Category: T3  Current Disease Status: Distant Metastases  AJCC N Category: N3  AJCC M Category: M1a  AJCC 8 Stage Grouping: EMELINA  Histology: Nonsquamous Cell  ROS1 Rearrangement Status: Negative  T790M Mutation Status: Not Applicable - EGFR Mutation Negative/Unknown  Other Mutations/Biomarkers: No Other Actionable Mutations  NTRK Gene Fusion Status: Negative  PD-L1 Expression Status: PD-L1 Negative  Chemotherapy/Immunotherapy LOT: Second Line Chemotherapy/Immunotherapy  Molecular Targeted Therapy: Not Appropriate  ALK Rearrangement Status: Negative  EGFR Mutation Status: Negative/Wild Type  BRAF V600E Mutation Status:  Negative  ECOG Performance Status: 0  Immunotherapy Candidate Status: Not a Candidate for Immunotherapy  Prior Immunotherapy Status: Prior PD-1/PD-L1 Inhibitor + Chemotherapy  Intent of Therapy:  Non-Curative / Palliative Intent, Not Discussed with Patient

## 2020-08-31 ENCOUNTER — LAB VISIT (OUTPATIENT)
Dept: LAB | Facility: HOSPITAL | Age: 55
End: 2020-08-31
Attending: INTERNAL MEDICINE
Payer: MEDICAID

## 2020-08-31 ENCOUNTER — TELEPHONE (OUTPATIENT)
Dept: HEMATOLOGY/ONCOLOGY | Facility: CLINIC | Age: 55
End: 2020-08-31

## 2020-08-31 DIAGNOSIS — C34.91 ADENOCARCINOMA, LUNG, RIGHT: ICD-10-CM

## 2020-08-31 DIAGNOSIS — Z51.5 PALLIATIVE CARE ENCOUNTER: Primary | ICD-10-CM

## 2020-08-31 DIAGNOSIS — R52 PAIN MANAGEMENT: ICD-10-CM

## 2020-08-31 LAB
ALBUMIN SERPL BCP-MCNC: 2.2 G/DL (ref 3.5–5.2)
ALP SERPL-CCNC: 82 U/L (ref 55–135)
ALT SERPL W/O P-5'-P-CCNC: 9 U/L (ref 10–44)
ANION GAP SERPL CALC-SCNC: 10 MMOL/L (ref 8–16)
AST SERPL-CCNC: 11 U/L (ref 10–40)
BASOPHILS # BLD AUTO: 0.02 K/UL (ref 0–0.2)
BASOPHILS NFR BLD: 0.3 % (ref 0–1.9)
BILIRUB SERPL-MCNC: 0.3 MG/DL (ref 0.1–1)
BUN SERPL-MCNC: 10 MG/DL (ref 6–20)
CALCIUM SERPL-MCNC: 8.8 MG/DL (ref 8.7–10.5)
CHLORIDE SERPL-SCNC: 94 MMOL/L (ref 95–110)
CO2 SERPL-SCNC: 30 MMOL/L (ref 23–29)
CREAT SERPL-MCNC: 0.6 MG/DL (ref 0.5–1.4)
DIFFERENTIAL METHOD: ABNORMAL
EOSINOPHIL # BLD AUTO: 0 K/UL (ref 0–0.5)
EOSINOPHIL NFR BLD: 0.5 % (ref 0–8)
ERYTHROCYTE [DISTWIDTH] IN BLOOD BY AUTOMATED COUNT: 16.8 % (ref 11.5–14.5)
EST. GFR  (AFRICAN AMERICAN): >60 ML/MIN/1.73 M^2
EST. GFR  (NON AFRICAN AMERICAN): >60 ML/MIN/1.73 M^2
GLUCOSE SERPL-MCNC: 177 MG/DL (ref 70–110)
HCT VFR BLD AUTO: 32.7 % (ref 40–54)
HGB BLD-MCNC: 10.4 G/DL (ref 14–18)
IMM GRANULOCYTES # BLD AUTO: 0.02 K/UL (ref 0–0.04)
IMM GRANULOCYTES NFR BLD AUTO: 0.3 % (ref 0–0.5)
LYMPHOCYTES # BLD AUTO: 2 K/UL (ref 1–4.8)
LYMPHOCYTES NFR BLD: 31.2 % (ref 18–48)
MCH RBC QN AUTO: 28.4 PG (ref 27–31)
MCHC RBC AUTO-ENTMCNC: 31.8 G/DL (ref 32–36)
MCV RBC AUTO: 89 FL (ref 82–98)
MONOCYTES # BLD AUTO: 1 K/UL (ref 0.3–1)
MONOCYTES NFR BLD: 15.8 % (ref 4–15)
NEUTROPHILS # BLD AUTO: 3.3 K/UL (ref 1.8–7.7)
NEUTROPHILS NFR BLD: 51.9 % (ref 38–73)
NRBC BLD-RTO: 0 /100 WBC
PLATELET # BLD AUTO: 353 K/UL (ref 150–350)
PMV BLD AUTO: 9 FL (ref 9.2–12.9)
POTASSIUM SERPL-SCNC: 3.2 MMOL/L (ref 3.5–5.1)
PROT SERPL-MCNC: 5.6 G/DL (ref 6–8.4)
RBC # BLD AUTO: 3.66 M/UL (ref 4.6–6.2)
SODIUM SERPL-SCNC: 134 MMOL/L (ref 136–145)
WBC # BLD AUTO: 6.28 K/UL (ref 3.9–12.7)

## 2020-08-31 PROCEDURE — 80053 COMPREHEN METABOLIC PANEL: CPT

## 2020-08-31 PROCEDURE — 36415 COLL VENOUS BLD VENIPUNCTURE: CPT

## 2020-08-31 PROCEDURE — 85025 COMPLETE CBC W/AUTO DIFF WBC: CPT

## 2020-08-31 RX ORDER — OXYCODONE AND ACETAMINOPHEN 10; 325 MG/1; MG/1
1 TABLET ORAL EVERY 4 HOURS PRN
Qty: 90 TABLET | Refills: 0 | Status: SHIPPED | OUTPATIENT
Start: 2020-08-31 | End: 2020-09-29 | Stop reason: SDUPTHER

## 2020-08-31 NOTE — TELEPHONE ENCOUNTER
Patients girlfriend called and stated patient has 6 percocets left. She states he needs refill sent to [facility] in [place]. She also stated another doctor refilled his gabapentin but they prescribed 300mg instead of 600mg. She just wanted to let you know.

## 2020-09-02 ENCOUNTER — OFFICE VISIT (OUTPATIENT)
Dept: HEMATOLOGY/ONCOLOGY | Facility: CLINIC | Age: 55
End: 2020-09-02
Payer: MEDICAID

## 2020-09-02 ENCOUNTER — INFUSION (OUTPATIENT)
Dept: INFUSION THERAPY | Facility: HOSPITAL | Age: 55
End: 2020-09-02
Attending: INTERNAL MEDICINE
Payer: MEDICAID

## 2020-09-02 VITALS
DIASTOLIC BLOOD PRESSURE: 72 MMHG | RESPIRATION RATE: 17 BRPM | HEART RATE: 98 BPM | TEMPERATURE: 99 F | SYSTOLIC BLOOD PRESSURE: 117 MMHG | OXYGEN SATURATION: 98 %

## 2020-09-02 VITALS
SYSTOLIC BLOOD PRESSURE: 109 MMHG | TEMPERATURE: 96 F | OXYGEN SATURATION: 96 % | HEART RATE: 122 BPM | WEIGHT: 153.88 LBS | HEIGHT: 68 IN | DIASTOLIC BLOOD PRESSURE: 74 MMHG | BODY MASS INDEX: 23.32 KG/M2

## 2020-09-02 DIAGNOSIS — E78.5 DYSLIPIDEMIA: ICD-10-CM

## 2020-09-02 DIAGNOSIS — C34.91 ADENOCARCINOMA, LUNG, RIGHT: Primary | ICD-10-CM

## 2020-09-02 DIAGNOSIS — G89.3 CANCER RELATED PAIN: ICD-10-CM

## 2020-09-02 DIAGNOSIS — T40.2X5A THERAPEUTIC OPIOID INDUCED CONSTIPATION: ICD-10-CM

## 2020-09-02 DIAGNOSIS — I10 ESSENTIAL HYPERTENSION: ICD-10-CM

## 2020-09-02 DIAGNOSIS — Z86.73 HISTORY OF CVA (CEREBROVASCULAR ACCIDENT): ICD-10-CM

## 2020-09-02 DIAGNOSIS — E87.6 HYPOKALEMIA: ICD-10-CM

## 2020-09-02 DIAGNOSIS — K59.03 THERAPEUTIC OPIOID INDUCED CONSTIPATION: ICD-10-CM

## 2020-09-02 DIAGNOSIS — E11.9 TYPE 2 DIABETES MELLITUS WITHOUT COMPLICATION, WITHOUT LONG-TERM CURRENT USE OF INSULIN: ICD-10-CM

## 2020-09-02 PROCEDURE — 25000003 PHARM REV CODE 250: Performed by: INTERNAL MEDICINE

## 2020-09-02 PROCEDURE — A4216 STERILE WATER/SALINE, 10 ML: HCPCS | Performed by: INTERNAL MEDICINE

## 2020-09-02 PROCEDURE — 96367 TX/PROPH/DG ADDL SEQ IV INF: CPT

## 2020-09-02 PROCEDURE — 96417 CHEMO IV INFUS EACH ADDL SEQ: CPT

## 2020-09-02 PROCEDURE — 99214 PR OFFICE/OUTPT VISIT, EST, LEVL IV, 30-39 MIN: ICD-10-PCS | Mod: S$PBB,,, | Performed by: INTERNAL MEDICINE

## 2020-09-02 PROCEDURE — 99999 PR PBB SHADOW E&M-EST. PATIENT-LVL V: ICD-10-PCS | Mod: PBBFAC,,, | Performed by: INTERNAL MEDICINE

## 2020-09-02 PROCEDURE — 2024F PR 7 FIELD PHOTOS WITH INTERP/ REVIEW: ICD-10-PCS | Mod: ,,, | Performed by: INTERNAL MEDICINE

## 2020-09-02 PROCEDURE — 99999 PR PBB SHADOW E&M-EST. PATIENT-LVL V: CPT | Mod: PBBFAC,,, | Performed by: INTERNAL MEDICINE

## 2020-09-02 PROCEDURE — 96413 CHEMO IV INFUSION 1 HR: CPT

## 2020-09-02 PROCEDURE — 99214 OFFICE O/P EST MOD 30 MIN: CPT | Mod: S$PBB,,, | Performed by: INTERNAL MEDICINE

## 2020-09-02 PROCEDURE — 99215 OFFICE O/P EST HI 40 MIN: CPT | Mod: PBBFAC,25 | Performed by: INTERNAL MEDICINE

## 2020-09-02 PROCEDURE — 63600175 PHARM REV CODE 636 W HCPCS: Performed by: INTERNAL MEDICINE

## 2020-09-02 PROCEDURE — 2024F 7 FLD RTA PHOTO EVC RTNOPTHY: CPT | Mod: ,,, | Performed by: INTERNAL MEDICINE

## 2020-09-02 RX ORDER — HEPARIN 100 UNIT/ML
500 SYRINGE INTRAVENOUS
Status: DISCONTINUED | OUTPATIENT
Start: 2020-09-02 | End: 2020-09-02 | Stop reason: HOSPADM

## 2020-09-02 RX ORDER — HEPARIN 100 UNIT/ML
500 SYRINGE INTRAVENOUS
Status: CANCELLED | OUTPATIENT
Start: 2020-09-02

## 2020-09-02 RX ORDER — SODIUM CHLORIDE 0.9 % (FLUSH) 0.9 %
10 SYRINGE (ML) INJECTION
Status: CANCELLED | OUTPATIENT
Start: 2020-09-02

## 2020-09-02 RX ORDER — POTASSIUM CHLORIDE 20 MEQ/1
40 TABLET, EXTENDED RELEASE ORAL DAILY
Qty: 30 TABLET | Refills: 11 | Status: SHIPPED | OUTPATIENT
Start: 2020-09-02 | End: 2021-04-20 | Stop reason: SDUPTHER

## 2020-09-02 RX ORDER — DEXAMETHASONE 4 MG/1
8 TABLET ORAL 2 TIMES DAILY
Qty: 40 TABLET | Refills: 0 | Status: SHIPPED | OUTPATIENT
Start: 2020-09-02 | End: 2020-09-12

## 2020-09-02 RX ORDER — POTASSIUM CHLORIDE 7.45 MG/ML
10 INJECTION INTRAVENOUS
Status: CANCELLED | OUTPATIENT
Start: 2020-09-02

## 2020-09-02 RX ORDER — SODIUM CHLORIDE 0.9 % (FLUSH) 0.9 %
10 SYRINGE (ML) INJECTION
Status: DISCONTINUED | OUTPATIENT
Start: 2020-09-02 | End: 2020-09-02 | Stop reason: HOSPADM

## 2020-09-02 RX ORDER — POTASSIUM CHLORIDE 7.45 MG/ML
10 INJECTION INTRAVENOUS
Status: COMPLETED | OUTPATIENT
Start: 2020-09-02 | End: 2020-09-02

## 2020-09-02 RX ADMIN — SODIUM CHLORIDE 10 MEQ: 9 INJECTION, SOLUTION INTRAVENOUS at 03:09

## 2020-09-02 RX ADMIN — HEPARIN 500 UNITS: 100 SYRINGE at 04:09

## 2020-09-02 RX ADMIN — Medication 10 ML: at 04:09

## 2020-09-02 RX ADMIN — SODIUM CHLORIDE 698 MG: 0.9 INJECTION, SOLUTION INTRAVENOUS at 02:09

## 2020-09-02 RX ADMIN — DIPHENHYDRAMINE HYDROCHLORIDE 50 MG: 50 INJECTION, SOLUTION INTRAMUSCULAR; INTRAVENOUS at 02:09

## 2020-09-02 RX ADMIN — DEXAMETHASONE SODIUM PHOSPHATE 20 MG: 10 INJECTION INTRAMUSCULAR; INTRAVENOUS at 03:09

## 2020-09-02 RX ADMIN — DOCETAXEL 135 MG: 20 INJECTION, SOLUTION, CONCENTRATE INTRAVENOUS at 03:09

## 2020-09-02 NOTE — PLAN OF CARE
Patient arrived to unit for first cycle of Cyramza, Taxotere. Plan of care reviewed, including new mediation information, possible side effects and symptom management. Discussed when to call provider or head to the ED. Home meds reviewed, including Zofran, Tylenol, Percocet, Imodium, Colace, Miralax, and Periactin, as well as new prescriptions for Dexamethasone and Potassium chloride. Patient instructed to  these new medications today. Handouts given. Patient verbalized understanding and is agreeable to plan. Patient tolerated treatment well, no reactions noted. VSS. Discharge paperwork given. Patient instructed to return on 9/8/20 for CT scan and 9/23/20 for labs, Dr. Avendaño follow up, and chemo. Patient ambulated unassisted off unit accompanied by his fiance. Patient in NAD at time of discharge.

## 2020-09-02 NOTE — Clinical Note
The patient can proceed with treatment today.  He will need IV potassium today.  He will need CT scans done next week.  He will need follow up with me on 9/23/20 with labs prior CBC, CMP and TSH with cycle 2 of chemo scheduled that day.

## 2020-09-02 NOTE — PROGRESS NOTES
PATIENT: Luis Felipe Hawk  MRN: [id number]  DATE: 9/2/2020      Diagnosis:   1. Adenocarcinoma, lung, right    2. Cancer related pain    3. Therapeutic opioid induced constipation    4. Essential hypertension    5. Type 2 diabetes mellitus without complication, without long-term current use of insulin    6. History of CVA (cerebrovascular accident)    7. Dyslipidemia    8. Hypokalemia        Chief Complaint: Follow-up (NSCLC)    Oncologic History:      Oncologic History 11/21/19 CXR  3/13/20 CT Chest  4/01/20 IR biopsy  4/09/20 PET/CT  5/07/20 PORT placement  7/09/20 CT Chest   7/22/20 Chest mass Biopsy  7/24/20 PET/CT    Oncologic Treatment 5/13/20 B 12 injection  5/20/20 Carboplatin/Alimta/Pembrolizumab cycle 3    Pathology 4/01/20 - adenocarcinoma suggestive of a possible gastrointestinal origin - PD-L1, ROS-1 and ALK negative  7/22/20 - moderately differentiated adenocarcinoma      Oncologic History:   The patient was initially seen by Dr Carmona on 11/21/19 for a cough and underwent a CXR showing a soft tissue mass in the anterior segment of the right upper lobe that measures at least 4 cm in cranial-caudad extent.  Per chart review an attempt to contact MR Hawk was mad on multiple occasions in order to have a CT of the chest done.  CT of the chest was eventually done on 3/13/20 and showed enlarged right paratracheal LN measuring 2.8 cm, slightly enlarged right hilar nodes, a mass within the anterior segment of the right upper lobe measuring 3.9 x 4.7 cm abutting the right suprahilar region and obliterating the anterior segmental bronchus.  The patient underwent IR guided biopsy of the right lung on 4/01/20 showing adenocarcinoma suggestive of a possible gastrointestinal origin.  MRI of the brain on 4/09/20 showed no evidence of intracranial metastatic disease, remote small infarcts within the right superior frontal lobe and remote lacunar-type infarcts of the bilateral lentiform nuclei and right thalamus.  PET/CT  on 4/09/20 showed a 6.5 x 3.7 cm pulmonary mass in the anterior segment of the right upper lobe abutting the mediastinal pleura, large right pretracheal soft tissue mass measuring 4.4 x 3.8 cm, left prevascular lymph node measuring 1.0 cm, few scattered bilateral poorly defined subcentimeter pulmonary nodules, and a right chest wall soft tissue lesion partially eroding the lateral right 3rd rib measuring 2 x 2 cm.  The patient had a PORT placed in the right chest on 5/07/20. He received his B12 injection on 5/13/20.  He also underwent US of the carotid on 5/14/20 showing 0-19% internal carotid artery stenosis on the right with a lymph node in the proximal neck lateral to the common carotid artery measuring 2.8 x 1.7 x 3.6 cm.  On the left there is 0-19% stenosis.  His PORT was placed on 5/07/20.  The patient saw Dr Bennett with vascular neurology on 5/21/20 for CVA seen on MRI of the brain 4/09/20.  Recommendations were made for ASA, statin and BP control to prevent secondary stroke.  The patient was then see in the ER on 5/22/20 with concern for PNA and treated with Levaquin.  He then saw Nellie Herrera with palliative care on 6/03/20 for symptoms management.  MS Contin 60 mg q 8 hours, Percocet 5/325 1 tablet q 4 hours prn for breakthrough pain/cough.   The patient underwent a CT of the chest on 7/09/20 showing a right paratracheal lymph node measuring 1.5 cm in short axis, previously 2.4 cm, a right upper lobe mass measuring 5.7 cm which is unchanged, patchy ground-glass opacities bilaterally, a 1.2 cm soft tissue density focus along the right posterior aspect of the trachea at the level of C7-T1, and a right chest wall mass centered at the destroyed 3rd rib measuring 6.6 cm, previously 4.3 cm.   The patient underwent a right chest wall mass biopsy on 7/22/20.  He underwent a PET/CT on 7/24/20 showing a right upper lobe paramediastinal mass measuring 5.8 x 3.9 cm previously 6.6 x 4.1; resolution in radiotracer  uptake in lymph nodes within the right supraclavicular, right paratracheal, and prevascular stations; increased size of a hypermetabolic soft tissue mass centered within the right lateral 3rd rib measuring 6.5 x 5.7 cm previously 2.9 x 2.1 cm.  Subjective:    Interval History: Mr. Hawk is a 54 y.o. male with HTN, HLD, DMII, CAD who presents to follow up for NSCLC adenocarcinoma.  Since the last clinic visit the patient missed multiple appts with radiation oncology at [facility].  He was eventually seen by radiation oncology and completed 10 fractions of treatment under the care of Dr Bishop on 9/01/20.  The patient states his pain is somewhat improved in the right chest.  He complains of chronic SOB.  His cough has improved.  He denies fever, chills.  The patient denies abdominal pain, N/V, constipation, diarrhea.  The patient denies fever, chills, night sweats, weight loss, new lumps or bumps, easy bruising or bleeding.    Past Medical History:   Past Medical History:   Diagnosis Date    CAD (coronary artery disease) 2013    one stent placed    DMII (diabetes mellitus, type 2)     Hypertension     Lung cancer     Mass of right lung     Mass of upper lobe of right lung 3/16/2020    3/13/2020 CT chest: Right upper lobe mass obliterating the anterior segmental bronchus. This is associated with right hilar and paramediastinal adenopathy.  This is concerning for bronchogenic carcinoma.  Sampling recommended.       Past Surgical HIstory:   Past Surgical History:   Procedure Laterality Date    CV STENT      X 1    INSERTION OF TUNNELED CENTRAL VENOUS CATHETER (CVC) WITH SUBCUTANEOUS PORT N/A 5/7/2020    Procedure: INSERTION, PORT-A-CATH;  Surgeon: Dosc Diagnostic Provider;  Location: [facility] OR;  Service: Radiology;  Laterality: N/A;  RN PREOP 5/7/20--rapid covid done    LUNG BIOPSY N/A 4/1/2020    Procedure: BIOPSY, LUNG;  Surgeon: Dosc Diagnostic Provider;  Location: [facility] OR;  Service: Radiology;  Laterality: N/A;   730AM STARTRN PHONE PREOP 3/31/2020    LUNG BIOPSY N/A 7/22/2020    Procedure: BIOPSY, LUNG;  Surgeon: Dagoberto Diagnostic Provider;  Location[facility] OR;  Service: Radiology;  Laterality: N/A;  9AM START  RN PREOP 7/21/2020--COVID NEGATIVE ON 7/21       Family History:   Family History   Problem Relation Age of Onset    Peripheral vascular disease Mother     Heart disease Father     No Known Problems Brother     No Known Problems Brother     No Known Problems Son     No Known Problems Son     No Known Problems Daughter     No Known Problems Daughter     No Known Problems Daughter        Social History:  reports that he has been smoking cigarettes. He has a 60.00 pack-year smoking history. He has never used smokeless tobacco. He reports current alcohol use of about 3.0 standard drinks of alcohol per week. He reports that he does not use drugs.    Allergies:  Review of patient's allergies indicates:   Allergen Reactions    Tramadol Other (See Comments)     Chest burning  Chest burning       Medications:  Current Outpatient Medications   Medication Sig Dispense Refill    acetaminophen (TYLENOL) 325 MG tablet Take 325 mg by mouth every 6 (six) hours as needed for Pain.      albuterol (PROVENTIL/VENTOLIN HFA) 90 mcg/actuation inhaler INHALE 2 PUFFS BY MOUTH EVERY 6 HOURS AS NEEDED FOR WHEEZING 18 g 11    aspirin (ECOTRIN) 81 MG EC tablet Take 1 tablet (81 mg total) by mouth once daily. 30 tablet 11    atorvastatin (LIPITOR) 80 MG tablet Take 1 tablet (80 mg total) by mouth once daily. 90 tablet 3    blood-glucose meter kit To check BG 1 times daily, to use with insurance preferred meter 1 each 0    buPROPion (WELLBUTRIN SR) 150 MG TBSR 12 hr tablet Take 1 tablet (150 mg total) by mouth 2 (two) times daily. 60 tablet 0    cyproheptadine (PERIACTIN) 4 mg tablet Take 1 tablet (4 mg total) by mouth 4 (four) times daily. 120 tablet 0    folic acid (FOLVITE) 1 MG tablet Take 1 tablet (1 mg total) by mouth once  daily. 30 tablet 6    gabapentin (NEURONTIN) 600 MG tablet Take 1 tablet (600 mg total) by mouth 3 (three) times daily. 30 tablet 2    hydroCHLOROthiazide (HYDRODIURIL) 25 MG tablet TK 1 T PO ONE TIME PER DAY FOR BP 90 tablet 3    metFORMIN (GLUCOPHAGE) 500 MG tablet 1 po qAM x 1 wk; then 1 po BID x 1 wk; then 2 AM/1 PM x 1 wk; then 2 BID maintenance. Take with food 120 tablet 5    metoprolol succinate (TOPROL-XL) 25 MG 24 hr tablet Take 1 tablet (25 mg total) by mouth once daily. 90 tablet 3    morphine (MS CONTIN) 100 MG 12 hr tablet Take 1 tablet (100 mg total) by mouth every 8 (eight) hours. 90 tablet 0    naproxen (NAPROSYN) 500 MG tablet TAKE 1 TABLET(500 MG) BY MOUTH TWICE DAILY 60 tablet 11    nicotine polacrilex 2 MG Lozg Use 1-2 per hour in place of a cigarette. Limit to 10 a day. 144 lozenge 0    ondansetron (ZOFRAN) 4 MG tablet Take 1 tablet (4 mg total) by mouth every 6 (six) hours as needed for Nausea. 90 tablet 6    oxyCODONE-acetaminophen (PERCOCET)  mg per tablet Take 1 tablet by mouth every 4 (four) hours as needed for Pain. 90 tablet 0    polyethylene glycol (MIRALAX) 17 gram PwPk Take 17 g by mouth 2 (two) times daily as needed (Constipation). 30 each 6    senna-docusate 8.6-50 mg (PERICOLACE) 8.6-50 mg per tablet Take 2 tablets by mouth once daily. 60 tablet 6    potassium chloride SA (K-DUR,KLOR-CON) 20 MEQ tablet Take 2 tablets (40 mEq total) by mouth once daily. 30 tablet 11     No current facility-administered medications for this visit.      Facility-Administered Medications Ordered in Other Visits   Medication Dose Route Frequency Provider Last Rate Last Dose    dexamethasone (DECADRON) 20 mg in sodium chloride 0.9% 50 mL IVPB  20 mg Intravenous 1 time in Clinic/HOD Richar Avendaño MD        DOCEtaxeL (TAXOTERE) 75 mg/m2 = 135 mg in sodium chloride 0.9% 250 mL chemo infusion  75 mg/m2 (Treatment Plan Recorded) Intravenous 1 time in Clinic/HOD Richar Avendaño MD      "   heparin, porcine (PF) 100 unit/mL injection flush 500 Units  500 Units Intravenous PRN Richar Avendaño MD        potassium chloride 10 mEq in 100 mL IVPB  10 mEq Intravenous 1 time in Clinic/HOD Richar Avendaño MD        ramucirumab (CYRAMZA) 698 mg in sodium chloride 0.9% 250 mL chemo infusion  10 mg/kg (Treatment Plan Recorded) Intravenous 1 time in Clinic/HOD Richar Avendaño MD   Stopped at 09/02/20 1523    sodium chloride 0.9% flush 10 mL  10 mL Intravenous PRN Richar Avendaño MD           Review of Systems   Constitutional: Negative for appetite change, chills, diaphoresis, fatigue, fever and unexpected weight change.   Respiratory: Positive for shortness of breath (with exertion). Negative for cough.    Cardiovascular: Negative for chest pain and palpitations.   Gastrointestinal: Negative for abdominal pain, constipation, diarrhea, nausea and vomiting.   Musculoskeletal:        Pain in his lateral right chest   Skin: Negative for color change and rash.   Neurological: Negative for headaches.   Hematological: Negative for adenopathy. Does not bruise/bleed easily.       ECOG Performance Status: 2   Objective:      Vitals:   Vitals:    09/02/20 1235   BP: 109/74   BP Location: Left arm   Patient Position: Sitting   BP Method: Medium (Automatic)   Pulse: (!) 122   Temp: 96.3 °F (35.7 °C)   TempSrc: Oral   SpO2: 96%   Weight: 69.8 kg (153 lb 14.1 oz)   Height: 5' 8" (1.727 m)       Physical Exam  Constitutional:       General: He is not in acute distress.     Appearance: He is well-developed. He is not diaphoretic.   HENT:      Head: Normocephalic and atraumatic.   Neck:      Musculoskeletal: Normal range of motion.   Cardiovascular:      Rate and Rhythm: Normal rate and regular rhythm.      Heart sounds: Normal heart sounds. No murmur. No friction rub. No gallop.    Pulmonary:      Effort: Pulmonary effort is normal. No respiratory distress.      Breath sounds: Normal breath sounds. No wheezing or " rales.   Chest:      Chest wall: No tenderness.   Abdominal:      General: Bowel sounds are normal. There is no distension.      Palpations: Abdomen is soft. There is no mass.      Tenderness: There is no abdominal tenderness. There is no rebound.   Musculoskeletal:      Comments: PORT in right chest   Lymphadenopathy:      Cervical: No cervical adenopathy.      Upper Body:      Right upper body: No supraclavicular adenopathy.      Left upper body: No supraclavicular adenopathy.   Skin:     General: Skin is warm and dry.      Findings: No erythema or rash.      Comments: Darkening of skin over right chest at radiation site.   Neurological:      Mental Status: He is alert and oriented to person, place, and time.   Psychiatric:         Behavior: Behavior normal.         Laboratory Data:  Lab Visit on 08/31/2020   Component Date Value Ref Range Status    WBC 08/31/2020 6.28  3.90 - 12.70 K/uL Final    RBC 08/31/2020 3.66* 4.60 - 6.20 M/uL Final    Hemoglobin 08/31/2020 10.4* 14.0 - 18.0 g/dL Final    Hematocrit 08/31/2020 32.7* 40.0 - 54.0 % Final    Mean Corpuscular Volume 08/31/2020 89  82 - 98 fL Final    Mean Corpuscular Hemoglobin 08/31/2020 28.4  27.0 - 31.0 pg Final    Mean Corpuscular Hemoglobin Conc 08/31/2020 31.8* 32.0 - 36.0 g/dL Final    RDW 08/31/2020 16.8* 11.5 - 14.5 % Final    Platelets 08/31/2020 353* 150 - 350 K/uL Final    MPV 08/31/2020 9.0* 9.2 - 12.9 fL Final    Immature Granulocytes 08/31/2020 0.3  0.0 - 0.5 % Final    Gran # (ANC) 08/31/2020 3.3  1.8 - 7.7 K/uL Final    Immature Grans (Abs) 08/31/2020 0.02  0.00 - 0.04 K/uL Final    Comment: Mild elevation in immature granulocytes is non specific and   can be seen in a variety of conditions including stress response,   acute inflammation, trauma and pregnancy. Correlation with other   laboratory and clinical findings is essential.      Lymph # 08/31/2020 2.0  1.0 - 4.8 K/uL Final    Mono # 08/31/2020 1.0  0.3 - 1.0 K/uL Final     Eos # 08/31/2020 0.0  0.0 - 0.5 K/uL Final    Baso # 08/31/2020 0.02  0.00 - 0.20 K/uL Final    nRBC 08/31/2020 0  0 /100 WBC Final    Gran% 08/31/2020 51.9  38.0 - 73.0 % Final    Lymph% 08/31/2020 31.2  18.0 - 48.0 % Final    Mono% 08/31/2020 15.8* 4.0 - 15.0 % Final    Eosinophil% 08/31/2020 0.5  0.0 - 8.0 % Final    Basophil% 08/31/2020 0.3  0.0 - 1.9 % Final    Differential Method 08/31/2020 Automated   Final    Sodium 08/31/2020 134* 136 - 145 mmol/L Final    Potassium 08/31/2020 3.2* 3.5 - 5.1 mmol/L Final    Chloride 08/31/2020 94* 95 - 110 mmol/L Final    CO2 08/31/2020 30* 23 - 29 mmol/L Final    Glucose 08/31/2020 177* 70 - 110 mg/dL Final    BUN, Bld 08/31/2020 10  6 - 20 mg/dL Final    Creatinine 08/31/2020 0.6  0.5 - 1.4 mg/dL Final    Calcium 08/31/2020 8.8  8.7 - 10.5 mg/dL Final    Total Protein 08/31/2020 5.6* 6.0 - 8.4 g/dL Final    Albumin 08/31/2020 2.2* 3.5 - 5.2 g/dL Final    Total Bilirubin 08/31/2020 0.3  0.1 - 1.0 mg/dL Final    Comment: For infants and newborns, interpretation of results should be based  on gestational age, weight and in agreement with clinical  observations.  Premature Infant recommended reference ranges:  Up to 24 hours.............<8.0 mg/dL  Up to 48 hours............<12.0 mg/dL  3-5 days..................<15.0 mg/dL  6-29 days.................<15.0 mg/dL      Alkaline Phosphatase 08/31/2020 82  55 - 135 U/L Final    AST 08/31/2020 11  10 - 40 U/L Final    ALT 08/31/2020 9* 10 - 44 U/L Final    Anion Gap 08/31/2020 10  8 - 16 mmol/L Final    eGFR if African American 08/31/2020 >60  >60 mL/min/1.73 m^2 Final    eGFR if non African American 08/31/2020 >60  >60 mL/min/1.73 m^2 Final    Comment: Calculation used to obtain the estimated glomerular filtration  rate (eGFR) is the CKD-EPI equation.            Imaging: CT Chest 7/09/20  Right IJ port with catheter terminating in the SVC.  No pericardial or pleural effusion.  No hilar lymphadenopathy.   Right paratracheal lymph node measures 1.5 cm in short axis, previously 2.4 cm in short axis (series 2, image 38).  There is interval heart is normal size.  Thoracic aorta is normal caliber.     Right upper lobe mass measures 5.7 cm, unchanged (series 2, image 51).  Patchy ground-glass opacities are noted bilaterally.  There is a 1.2 cm soft tissue density focus along the right posterior aspect of the trachea at the level of C7-T1, not seen previously.  Central airways are otherwise patent.     Right chest wall mass centered at the destroyed 3rd rib measures 6.6 cm, previously 4.3 cm.  Limited evaluation of the upper abdomen is unremarkable.    PET/CT 7/24/20    Quality of the study: Adequate.     In the head and neck, there are no hypermetabolic lesions worrisome for malignancy. There are no hypermetabolic mucosal lesions, and there are no pathologically enlarged or hypermetabolic lymph nodes.     In the chest, there is a hypermetabolic right upper lobe paramediastinal mass measuring 5.8 x 3.9 cm (previously 6.6 x 4.1) with maximum SUV of 14 (previously 8.9).  Respiratory motion artifact limits fine detail assessment of the pulmonary parenchyma.     Previously identified enlarged hypermetabolic lymph nodes within the right supraclavicular, right paratracheal, and prevascular stations demonstrate normal size and normalized radiotracer uptake on today's examination.  For reference, 1.0 cm pretracheal lymph node (previously 4.4 cm) demonstrates normal radiotracer uptake (previously maximum SUV of 13).     In the abdomen and pelvis, there is physiologic tracer distribution within the abdominal organs and excretion into the genitourinary system.  Infrarenal abdominal aortic ectasia.     In the bones, there is increased size of a hypermetabolic soft tissue mass centered within the right lateral 3rd rib measuring 6.5 x 5.7 cm (previously 2.9 x 2.1 cm) with maximum SUV 24 (previously 21).        Assessment:       1.  Adenocarcinoma, lung, right    2. Cancer related pain    3. Therapeutic opioid induced constipation    4. Essential hypertension    5. Type 2 diabetes mellitus without complication, without long-term current use of insulin    6. History of CVA (cerebrovascular accident)    7. Dyslipidemia    8. Hypokalemia           Plan:     NSCLC - biopsy of the right lung on 4/01/20 showed adenocarcinoma  -PET/CT on 4/09/20 showed 6.5 x 3.7 cm pulmonary mass in the anterior segment of the right upper lobe abutting the mediastinal pleura, large right pretracheal soft tissue mass measuring 4.4 x 3.8 cm, left prevascular lymph node measuring 1.0 cm, few scattered bilateral poorly defined subcentimeter pulmonary nodules, and a right chest wall soft tissue lesion partially eroding the lateral right 3rd rib measuring 2 x 2 cm  -The patient received 3 cycles of Carboplatin, Pemetrexed and Pembrolizumab   -CT of the chest on 7/09/20 showed progression of the right chest wall mass now measuring 6.6cm  -Pt discussed at thoracic tumor board with no intra pulmonary intervention which could improve the patient's respiratory status  -Pt to see radiation oncology tomorrow at [facility]  -Chest mass biopsied on 7/22/20 with path sent to STRATA  -STRATA trial showed a PTEN mutation indicating possibility of entering into a trial though the [facility]  -Will proceed currently with treatment with Docetaxel and Ramucirumab  -Patient was consented for chemotherapy today 9/2/2020 for Docetaxel and Ramucirumab.   An extensive discussion was had which included a thorough discussion of the risk and benefits of treatment and alternatives.  Risks, including but not limited to, possible hair loss, bone marrow damage (anemia, thrombocytopenia, immune suppression, neutropenia), damage to body organs (brain, heart, liver, kidney, lungs, nervous system, skin, and others), allergic reactions, sterility, nausea/vomiting, constipation/diarrhea, sores in the mouth,  secondary cancers, local damage at possible injection sites, and rarely death were all discussed.  The patient agrees with the plan, and all questions have been answered to their satisfaction.  Consent was signed the patient, provider, and a third party witness.    -Will repeat staging scans given delay in his treatment due to missed appts  -Pt to start dex 8mg BID the day before chemo, day of chemo and day after chemo.    Cancer Related pain - Pt managed by Mrs Herrera in palliative care.  -MS Contin increased to 100mg every 8 hours  -Pt also taking percocet 10-325mg every 4 hours.    -Palliative care managing  -Pt states pain somewhat improved after radiation    Opioid Induced Constipation - Constipation improved  -Will monitor    HTN - pt on HCTZ and metoprolol  -Will monitor    HLD - pt on lipitor  -PCP managing    DMII - pt's A1C is 10.1 on 3/13/20  -Pt on metformin  -Will monitor    CAD -  Pt on statin, ASA, metoprolol    CVA - MRI of the brain on 4/09/20 showed old infarcts in the right superior frontal lobe and remote lacunar-type infarcts of the bilateral lentiform nuclei and right thalamus  -Pt already on ASA and statin  -Pt saw Dr Bennett on 5/21/20 who recommended continued ASA, statin and BP control    Hypokaliemia - may be from HCTZ  -Will start the patient on 40mEq daily of potassium  -Pt to receive potassium with treatment today    Advance Care Planning     Power of   I initiated the process of advance care planning today and explained the importance of this process to the patient.  I introduced the concept of advance directives to the patient, as well. Then the patient received detailed information about the importance of designating a Health Care Power of  (HCPOA). He was also instructed to communicate with this person about their wishes for future healthcare, should he become sick and lose decision-making capacity. The patient has not previously appointed a HCPOA. After our  discussion, the patient has decided to complete a HCPOA and will bring the form completed to his next clinic appointment.                -Follow Up - CT scans next week.  Appt on 9/23/20 with labs prior CBC, CMP, TSH, Urinalysis with cycle 2 of chemo    Richar Avendaño MD  Hematology and Oncology  [facility]  Office:[phone]  Fax: [phone]

## 2020-09-10 ENCOUNTER — HOSPITAL ENCOUNTER (OUTPATIENT)
Dept: RADIOLOGY | Facility: HOSPITAL | Age: 55
Discharge: HOME | End: 2020-09-10
Attending: INTERNAL MEDICINE
Payer: MEDICAID

## 2020-09-10 DIAGNOSIS — C34.91 ADENOCARCINOMA, LUNG, RIGHT: ICD-10-CM

## 2020-09-10 PROCEDURE — 71260 CT THORAX DX C+: CPT | Mod: 26,,, | Performed by: RADIOLOGY

## 2020-09-10 PROCEDURE — 71260 CT CHEST ABDOMEN PELVIS WITH CONTRAST (XPD): ICD-10-PCS | Mod: 26,,, | Performed by: RADIOLOGY

## 2020-09-10 PROCEDURE — 74177 CT CHEST ABDOMEN PELVIS WITH CONTRAST (XPD): ICD-10-PCS | Mod: 26,,, | Performed by: RADIOLOGY

## 2020-09-10 PROCEDURE — 74177 CT ABD & PELVIS W/CONTRAST: CPT | Mod: 26,,, | Performed by: RADIOLOGY

## 2020-09-10 PROCEDURE — 74177 CT ABD & PELVIS W/CONTRAST: CPT | Mod: TC

## 2020-09-10 PROCEDURE — 25500020 PHARM REV CODE 255: Performed by: INTERNAL MEDICINE

## 2020-09-10 RX ADMIN — IOHEXOL 15 ML: 300 INJECTION, SOLUTION INTRAVENOUS at 01:09

## 2020-09-10 RX ADMIN — IOHEXOL 75 ML: 350 INJECTION, SOLUTION INTRAVENOUS at 01:09

## 2020-09-11 ENCOUNTER — TELEPHONE (OUTPATIENT)
Dept: HEMATOLOGY/ONCOLOGY | Facility: CLINIC | Age: 55
End: 2020-09-11

## 2020-09-11 DIAGNOSIS — K59.03 THERAPEUTIC OPIOID INDUCED CONSTIPATION: ICD-10-CM

## 2020-09-11 DIAGNOSIS — T40.2X5A THERAPEUTIC OPIOID INDUCED CONSTIPATION: ICD-10-CM

## 2020-09-11 RX ORDER — AMOXICILLIN 250 MG
2 CAPSULE ORAL DAILY
Qty: 60 TABLET | Refills: 6 | Status: SHIPPED | OUTPATIENT
Start: 2020-09-11 | End: 2021-01-05 | Stop reason: SDUPTHER

## 2020-09-11 RX ORDER — POLYETHYLENE GLYCOL 3350 17 G/17G
17 POWDER, FOR SOLUTION ORAL 2 TIMES DAILY PRN
Qty: 30 EACH | Refills: 6 | Status: SHIPPED | OUTPATIENT
Start: 2020-09-11 | End: 2021-01-05 | Stop reason: SDUPTHER

## 2020-09-11 NOTE — TELEPHONE ENCOUNTER
I called the patient and reviewed his recent CT scan with him.  We discussed that this scan would be used as his new baseline.  The patient expressed understanding.  All questions were answered to his satisfaction.    Richar Avendaño MD  Hematology and Oncology  [facility]  Office:[phone]  Fax: [phone]

## 2020-09-17 DIAGNOSIS — G89.3 CANCER RELATED PAIN: ICD-10-CM

## 2020-09-17 RX ORDER — MORPHINE SULFATE 100 MG/1
100 TABLET, FILM COATED, EXTENDED RELEASE ORAL EVERY 8 HOURS
Qty: 90 TABLET | Refills: 0 | Status: SHIPPED | OUTPATIENT
Start: 2020-09-17 | End: 2020-09-21 | Stop reason: SDUPTHER

## 2020-09-18 ENCOUNTER — TELEPHONE (OUTPATIENT)
Dept: FAMILY MEDICINE | Facility: CLINIC | Age: 55
End: 2020-09-18

## 2020-09-18 NOTE — TELEPHONE ENCOUNTER
----- Message from Sharda Collins sent at 9/17/2020 12:22 PM CDT -----  Name of Who is Calling: Annabelle(wife)      What is the request in detail: Pt wife is calling to speak to staff in regards to the pt eating unhealthy and pt wife is concern about the pts health .... Please call to further assist .       Can the clinic reply by [facility]: N      What Number to Call Back if not in [facility]: [phone]

## 2020-09-21 DIAGNOSIS — G89.3 CANCER RELATED PAIN: ICD-10-CM

## 2020-09-21 RX ORDER — MORPHINE SULFATE 100 MG/1
100 TABLET, FILM COATED, EXTENDED RELEASE ORAL EVERY 8 HOURS
Qty: 90 TABLET | Refills: 0 | Status: SHIPPED | OUTPATIENT
Start: 2020-09-21 | End: 2020-11-05 | Stop reason: SDUPTHER

## 2020-09-21 NOTE — PROGRESS NOTES
Patient called to let me know that MS contin not at pharmacy. I sent to wrong pharmacy and had to resend prescription to [facility]. Called [facility] to make sure they do not fill

## 2020-09-22 NOTE — PROGRESS NOTES
PATIENT: Luis Felipe Hawk  MRN: [id number]  DATE: 9/23/2020      Diagnosis:   1. Adenocarcinoma, lung, right    2. Tobacco abuse    3. Nicotine dependence    4. Cancer related pain    5. Therapeutic opioid induced constipation    6. Essential hypertension    7. Type 2 diabetes mellitus without complication, without long-term current use of insulin    8. History of CVA (cerebrovascular accident)    9. Dyslipidemia    10. Hypokalemia        Chief Complaint: Follow-up (NSCLC)    Oncologic History:      Oncologic History 11/21/19 CXR  3/13/20 CT Chest  4/01/20 IR biopsy  4/09/20 PET/CT  5/07/20 PORT placement  7/09/20 CT Chest   7/22/20 Chest mass Biopsy  7/24/20 PET/CT  9/10/20 CT C/A/P    Oncologic Treatment 5/13/20 B 12 injection  5/20/20 Carboplatin/Alimta/Pembrolizumab cycle 3  9/02/20 Docetaxel/Ramucirumab - present s/p cycle 1    Pathology 4/01/20 - adenocarcinoma suggestive of a possible gastrointestinal origin - PD-L1, ROS-1 and ALK negative  7/22/20 - moderately differentiated adenocarcinoma      Oncologic History:   The patient was initially seen by Dr Carmona on 11/21/19 for a cough and underwent a CXR showing a soft tissue mass in the anterior segment of the right upper lobe that measures at least 4 cm in cranial-caudad extent.  Per chart review an attempt to contact MR Hawk was mad on multiple occasions in order to have a CT of the chest done.  CT of the chest was eventually done on 3/13/20 and showed enlarged right paratracheal LN measuring 2.8 cm, slightly enlarged right hilar nodes, a mass within the anterior segment of the right upper lobe measuring 3.9 x 4.7 cm abutting the right suprahilar region and obliterating the anterior segmental bronchus.  The patient underwent IR guided biopsy of the right lung on 4/01/20 showing adenocarcinoma suggestive of a possible gastrointestinal origin.  MRI of the brain on 4/09/20 showed no evidence of intracranial metastatic disease, remote small infarcts within the  right superior frontal lobe and remote lacunar-type infarcts of the bilateral lentiform nuclei and right thalamus.  PET/CT on 4/09/20 showed a 6.5 x 3.7 cm pulmonary mass in the anterior segment of the right upper lobe abutting the mediastinal pleura, large right pretracheal soft tissue mass measuring 4.4 x 3.8 cm, left prevascular lymph node measuring 1.0 cm, few scattered bilateral poorly defined subcentimeter pulmonary nodules, and a right chest wall soft tissue lesion partially eroding the lateral right 3rd rib measuring 2 x 2 cm.  The patient had a PORT placed in the right chest on 5/07/20. He received his B12 injection on 5/13/20.  He also underwent US of the carotid on 5/14/20 showing 0-19% internal carotid artery stenosis on the right with a lymph node in the proximal neck lateral to the common carotid artery measuring 2.8 x 1.7 x 3.6 cm.  On the left there is 0-19% stenosis.  His PORT was placed on 5/07/20.  The patient saw Dr Bennett with vascular neurology on 5/21/20 for CVA seen on MRI of the brain 4/09/20.  Recommendations were made for ASA, statin and BP control to prevent secondary stroke.  The patient was then see in the ER on 5/22/20 with concern for PNA and treated with Levaquin.  He then saw Nellie Herrera with palliative care on 6/03/20 for symptoms management.  MS Contin 60 mg q 8 hours, Percocet 5/325 1 tablet q 4 hours prn for breakthrough pain/cough.   The patient underwent a CT of the chest on 7/09/20 showing a right paratracheal lymph node measuring 1.5 cm in short axis, previously 2.4 cm, a right upper lobe mass measuring 5.7 cm which is unchanged, patchy ground-glass opacities bilaterally, a 1.2 cm soft tissue density focus along the right posterior aspect of the trachea at the level of C7-T1, and a right chest wall mass centered at the destroyed 3rd rib measuring 6.6 cm, previously 4.3 cm.   The patient underwent a right chest wall mass biopsy on 7/22/20.  He underwent a PET/CT on  7/24/20 showing a right upper lobe paramediastinal mass measuring 5.8 x 3.9 cm previously 6.6 x 4.1; resolution in radiotracer uptake in lymph nodes within the right supraclavicular, right paratracheal, and prevascular stations; increased size of a hypermetabolic soft tissue mass centered within the right lateral 3rd rib measuring 6.5 x 5.7 cm previously 2.9 x 2.1 cm.  Subjective:    Interval History: Mr. Hawk is a 54 y.o. male with HTN, HLD, DMII, CAD who presents to follow up for NSCLC adenocarcinoma.  Since the last clinic visit the patient underwent restaging scans on 9/10/20 showing an enlarged lymph node anterior to the stephanie measuring 1.0 cm in short axis compared to 1.5 cm on the prior study, a right upper lobe lung mass measuring 4.7 cm compared to 5.7 cm on the prior study, right chest wall mass centered at the destroyed right 3rd rib measures approximately 7.3 cm compared to 6.6 cm on the prior study.  The patient states he is still having pain in the right shoulder rated 6/10.  The patient denies CP, SOB, abdominal pain, N/V, constipation, diarrhea.  The patient denies fever, chills, night sweats, weight loss, new lumps or bumps, easy bruising or bleeding.    Past Medical History:   Past Medical History:   Diagnosis Date    CAD (coronary artery disease) 2013    one stent placed    DMII (diabetes mellitus, type 2)     Hypertension     Lung cancer     Mass of right lung     Mass of upper lobe of right lung 3/16/2020    3/13/2020 CT chest: Right upper lobe mass obliterating the anterior segmental bronchus. This is associated with right hilar and paramediastinal adenopathy.  This is concerning for bronchogenic carcinoma.  Sampling recommended.       Past Surgical HIstory:   Past Surgical History:   Procedure Laterality Date    CV STENT      X 1    INSERTION OF TUNNELED CENTRAL VENOUS CATHETER (CVC) WITH SUBCUTANEOUS PORT N/A 5/7/2020    Procedure: INSERTION, PORT-A-CATH;  Surgeon: [facility]  Provider;  Location: [facility] OR;  Service: Radiology;  Laterality: N/A;  RN PREOP 5/7/20--rapid covid done    LUNG BIOPSY N/A 4/1/2020    Procedure: BIOPSY, LUNG;  Surgeon: [facility] Diagnostic Provider;  Location: [facility] OR;  Service: Radiology;  Laterality: N/A;  730AM STARTRN PHONE PREOP 3/31/2020    LUNG BIOPSY N/A 7/22/2020    Procedure: BIOPSY, LUNG;  Surgeon: [facility] Diagnostic Provider;  Location: [facility] OR;  Service: Radiology;  Laterality: N/A;  9AM START  RN PREOP 7/21/2020--COVID NEGATIVE ON 7/21       Family History:   Family History   Problem Relation Age of Onset    Peripheral vascular disease Mother     Heart disease Father     No Known Problems Brother     No Known Problems Brother     No Known Problems Son     No Known Problems Son     No Known Problems Daughter     No Known Problems Daughter     No Known Problems Daughter        Social History:  reports that he has been smoking cigarettes. He has a 60.00 pack-year smoking history. He has never used smokeless tobacco. He reports current alcohol use of about 3.0 standard drinks of alcohol per week. He reports that he does not use drugs.    Allergies:  Review of patient's allergies indicates:   Allergen Reactions    Tramadol Other (See Comments)     Chest burning  Chest burning       Medications:  Current Outpatient Medications   Medication Sig Dispense Refill    acetaminophen (TYLENOL) 325 MG tablet Take 325 mg by mouth every 6 (six) hours as needed for Pain.      albuterol (PROVENTIL/VENTOLIN HFA) 90 mcg/actuation inhaler INHALE 2 PUFFS BY MOUTH EVERY 6 HOURS AS NEEDED FOR WHEEZING 18 g 11    aspirin (ECOTRIN) 81 MG EC tablet Take 1 tablet (81 mg total) by mouth once daily. 30 tablet 11    atorvastatin (LIPITOR) 80 MG tablet Take 1 tablet (80 mg total) by mouth once daily. 90 tablet 3    blood-glucose meter kit To check BG 1 times daily, to use with insurance preferred meter 1 each 0    cyproheptadine (PERIACTIN) 4 mg tablet Take 1 tablet (4 mg  total) by mouth 4 (four) times daily. 120 tablet 0    folic acid (FOLVITE) 1 MG tablet Take 1 tablet (1 mg total) by mouth once daily. 30 tablet 6    gabapentin (NEURONTIN) 600 MG tablet Take 1 tablet (600 mg total) by mouth 3 (three) times daily. 30 tablet 2    hydroCHLOROthiazide (HYDRODIURIL) 25 MG tablet TK 1 T PO ONE TIME PER DAY FOR BP 90 tablet 3    metFORMIN (GLUCOPHAGE) 500 MG tablet 1 po qAM x 1 wk; then 1 po BID x 1 wk; then 2 AM/1 PM x 1 wk; then 2 BID maintenance. Take with food 120 tablet 5    metoprolol succinate (TOPROL-XL) 25 MG 24 hr tablet Take 1 tablet (25 mg total) by mouth once daily. 90 tablet 3    morphine (MS CONTIN) 100 MG 12 hr tablet Take 1 tablet (100 mg total) by mouth every 8 (eight) hours. 90 tablet 0    naproxen (NAPROSYN) 500 MG tablet TAKE 1 TABLET(500 MG) BY MOUTH TWICE DAILY 60 tablet 11    nicotine polacrilex 2 MG Lozg Use 1-2 per hour in place of a cigarette. Limit to 10 a day. 144 lozenge 0    ondansetron (ZOFRAN) 4 MG tablet Take 1 tablet (4 mg total) by mouth every 6 (six) hours as needed for Nausea. 90 tablet 6    oxyCODONE-acetaminophen (PERCOCET)  mg per tablet Take 1 tablet by mouth every 4 (four) hours as needed for Pain. 90 tablet 0    polyethylene glycol (MIRALAX) 17 gram PwPk Take 17 g by mouth 2 (two) times daily as needed (Constipation). 30 each 6    potassium chloride SA (K-DUR,KLOR-CON) 20 MEQ tablet Take 2 tablets (40 mEq total) by mouth once daily. 30 tablet 11    senna-docusate 8.6-50 mg (PERICOLACE) 8.6-50 mg per tablet Take 2 tablets by mouth once daily. 60 tablet 6    buPROPion (WELLBUTRIN SR) 150 MG TBSR 12 hr tablet Take 1 tablet (150 mg total) by mouth 2 (two) times daily. 60 tablet 0    dexAMETHasone (DECADRON) 4 MG Tab Take 2 tablets (8 mg total) by mouth every 12 (twelve) hours. Take 2 tablets (8mg total) by mouth every 12 hours the day before chemo, the day of chemo and the day after chemo. 120 tablet 0     No current  "facility-administered medications for this visit.      Facility-Administered Medications Ordered in Other Visits   Medication Dose Route Frequency Provider Last Rate Last Dose    dexamethasone (DECADRON) 20 mg in sodium chloride 0.9% 50 mL IVPB  20 mg Intravenous 1 time in Clinic/HOD Richar Avendaño MD        diphenhydrAMINE (BENADRYL) 50 mg in sodium chloride 0.9% 50 mL IVPB  50 mg Intravenous 1 time in Clinic/HOD Richar Avendaño MD        DOCEtaxeL (TAXOTERE) 75 mg/m2 = 145 mg in sodium chloride 0.9% 250 mL chemo infusion  75 mg/m2 (Treatment Plan Recorded) Intravenous 1 time in Clinic/HOD Richar Avendaño MD        heparin, porcine (PF) 100 unit/mL injection flush 500 Units  500 Units Intravenous PRN Richar Avendaño MD        ramucirumab (CYRAMZA) 755 mg in sodium chloride 0.9% 250 mL chemo infusion  10 mg/kg (Treatment Plan Recorded) Intravenous 1 time in Clinic/HOD Richar Avendaño  mL/hr at 09/23/20 1247 755 mg at 09/23/20 1247    sodium chloride 0.9% flush 10 mL  10 mL Intravenous PRN Richar Avendaño MD           Review of Systems   Constitutional: Negative for appetite change, chills, diaphoresis, fatigue, fever and unexpected weight change.   Respiratory: Negative for cough and shortness of breath.    Cardiovascular: Negative for chest pain and palpitations.   Gastrointestinal: Negative for abdominal pain, constipation, diarrhea, nausea and vomiting.   Musculoskeletal:        Pain in his lateral right chest   Skin: Negative for color change and rash.   Neurological: Negative for headaches.   Hematological: Negative for adenopathy. Does not bruise/bleed easily.       ECOG Performance Status: 1    Objective:      Vitals:   Vitals:    09/23/20 1031   BP: 99/62   BP Location: Left arm   Patient Position: Sitting   BP Method: Large (Automatic)   Pulse: 104   Temp: 98.6 °F (37 °C)   TempSrc: Oral   SpO2: 99%   Weight: 75.5 kg (166 lb 7.2 oz)   Height: 5' 8" (1.727 m)       Physical " Exam  Constitutional:       General: He is not in acute distress.     Appearance: He is well-developed. He is not diaphoretic.   HENT:      Head: Normocephalic and atraumatic.   Neck:      Musculoskeletal: Normal range of motion.   Cardiovascular:      Rate and Rhythm: Normal rate and regular rhythm.      Heart sounds: Normal heart sounds. No murmur. No friction rub. No gallop.    Pulmonary:      Effort: Pulmonary effort is normal. No respiratory distress.      Breath sounds: Normal breath sounds. No wheezing or rales.   Chest:      Chest wall: No tenderness.   Abdominal:      General: Bowel sounds are normal. There is no distension.      Palpations: Abdomen is soft. There is no mass.      Tenderness: There is no abdominal tenderness. There is no rebound.   Musculoskeletal:      Comments: PORT in right chest   Lymphadenopathy:      Cervical: No cervical adenopathy.      Upper Body:      Right upper body: No supraclavicular adenopathy.      Left upper body: No supraclavicular adenopathy.   Skin:     General: Skin is warm and dry.      Findings: No erythema or rash.      Comments: Darkening of skin over right chest and right posterior shoulder at radiation site.   Neurological:      Mental Status: He is alert and oriented to person, place, and time.   Psychiatric:         Behavior: Behavior normal.         Laboratory Data:  Lab Visit on 09/23/2020   Component Date Value Ref Range Status    Specimen UA 09/23/2020 Urine, Clean Catch   Final    Color, UA 09/23/2020 Yellow  Yellow, Straw, Julieta Final    Appearance, UA 09/23/2020 Clear  Clear Final    pH, UA 09/23/2020 5.0  5.0 - 8.0 Final    Specific [place], UA 09/23/2020 1.020  1.005 - 1.030 Final    Protein, UA 09/23/2020 Negative  Negative Final    Comment: Recommend a 24 hour urine protein or a urine   protein/creatinine ratio if globulin induced proteinuria is  clinically suspected.      Glucose, UA 09/23/2020 Negative  Negative Final    Ketones, UA  09/23/2020 Negative  Negative Final    Bilirubin (UA) 09/23/2020 Negative  Negative Final    Occult Blood UA 09/23/2020 Negative  Negative Final    Nitrite, UA 09/23/2020 Negative  Negative Final    Urobilinogen, UA 09/23/2020 4.0-6.0* <2.0 EU/dL Final    Leukocytes, UA 09/23/2020 Trace* Negative Final    RBC, UA 09/23/2020 0  0 - 4 /hpf Final    WBC, UA 09/23/2020 13* 0 - 5 /hpf Final    Bacteria 09/23/2020 Few* None-Occ /hpf Final    Squam Epithel, UA 09/23/2020 4  /hpf Final    Hyaline Casts, UA 09/23/2020 1  0-1/lpf /lpf Final    Microscopic Comment 09/23/2020 SEE COMMENT   Final    Comment: Other formed elements not mentioned in the report are not   present in the microscopic examination.      Lab Visit on 09/23/2020   Component Date Value Ref Range Status    TSH 09/23/2020 1.637  0.400 - 4.000 uIU/mL Final    WBC 09/23/2020 6.54  3.90 - 12.70 K/uL Final    RBC 09/23/2020 3.43* 4.60 - 6.20 M/uL Final    Hemoglobin 09/23/2020 9.7* 14.0 - 18.0 g/dL Final    Hematocrit 09/23/2020 32.1* 40.0 - 54.0 % Final    Mean Corpuscular Volume 09/23/2020 94  82 - 98 fL Final    Mean Corpuscular Hemoglobin 09/23/2020 28.3  27.0 - 31.0 pg Final    Mean Corpuscular Hemoglobin Conc 09/23/2020 30.2* 32.0 - 36.0 g/dL Final    RDW 09/23/2020 17.6* 11.5 - 14.5 % Final    Platelets 09/23/2020 235  150 - 350 K/uL Final    MPV 09/23/2020 9.4  9.2 - 12.9 fL Final    Immature Granulocytes 09/23/2020 0.3  0.0 - 0.5 % Final    Gran # (ANC) 09/23/2020 3.5  1.8 - 7.7 K/uL Final    Immature Grans (Abs) 09/23/2020 0.02  0.00 - 0.04 K/uL Final    Comment: Mild elevation in immature granulocytes is non specific and   can be seen in a variety of conditions including stress response,   acute inflammation, trauma and pregnancy. Correlation with other   laboratory and clinical findings is essential.      Lymph # 09/23/2020 2.2  1.0 - 4.8 K/uL Final    Mono # 09/23/2020 0.8  0.3 - 1.0 K/uL Final    Eos # 09/23/2020 0.0  0.0  - 0.5 K/uL Final    Baso # 09/23/2020 0.01  0.00 - 0.20 K/uL Final    nRBC 09/23/2020 0  0 /100 WBC Final    Gran% 09/23/2020 53.1  38.0 - 73.0 % Final    Lymph% 09/23/2020 34.1  18.0 - 48.0 % Final    Mono% 09/23/2020 12.1  4.0 - 15.0 % Final    Eosinophil% 09/23/2020 0.2  0.0 - 8.0 % Final    Basophil% 09/23/2020 0.2  0.0 - 1.9 % Final    Differential Method 09/23/2020 Automated   Final    Sodium 09/23/2020 134* 136 - 145 mmol/L Final    Potassium 09/23/2020 3.4* 3.5 - 5.1 mmol/L Final    Chloride 09/23/2020 96  95 - 110 mmol/L Final    CO2 09/23/2020 29  23 - 29 mmol/L Final    Glucose 09/23/2020 146* 70 - 110 mg/dL Final    BUN, Bld 09/23/2020 6  6 - 20 mg/dL Final    Creatinine 09/23/2020 0.7  0.5 - 1.4 mg/dL Final    Calcium 09/23/2020 8.2* 8.7 - 10.5 mg/dL Final    Total Protein 09/23/2020 6.0  6.0 - 8.4 g/dL Final    Albumin 09/23/2020 2.3* 3.5 - 5.2 g/dL Final    Total Bilirubin 09/23/2020 0.2  0.1 - 1.0 mg/dL Final    Comment: For infants and newborns, interpretation of results should be based  on gestational age, weight and in agreement with clinical  observations.  Premature Infant recommended reference ranges:  Up to 24 hours.............<8.0 mg/dL  Up to 48 hours............<12.0 mg/dL  3-5 days..................<15.0 mg/dL  6-29 days.................<15.0 mg/dL      Alkaline Phosphatase 09/23/2020 118  55 - 135 U/L Final    AST 09/23/2020 9* 10 - 40 U/L Final    ALT 09/23/2020 11  10 - 44 U/L Final    Anion Gap 09/23/2020 9  8 - 16 mmol/L Final    eGFR if African American 09/23/2020 >60  >60 mL/min/1.73 m^2 Final    eGFR if non African American 09/23/2020 >60  >60 mL/min/1.73 m^2 Final    Comment: Calculation used to obtain the estimated glomerular filtration  rate (eGFR) is the CKD-EPI equation.            Imaging: CT Chest 9/10/20     There is a right internal jugular Port-A-Cath present with the tip of the catheter within the superior vena cava near the cavoatrial junction.   The structures at the base of the neck appear grossly unremarkable.  The heart is not enlarged.  The thoracic aorta is normal in caliber and there is no evidence for thoracic aortic aneurysm or aortic dissection.  There is a borderline enlarged lymph node anterior to the stephanie measuring 1.0 cm in short axis.  (Image 39, series 2) compared to 1.5 cm on the prior study.  There is a right upper lobe lung mass present measuring 4.7 cm (image 48, series 2) compared to 5.7 cm on the prior study.  There is some adjacent ground-glass consolidation present as well as mild atelectasis.  No new lung nodules are identified.  The previously identified soft tissue density focus along the posterior aspect of the trachea is no longer evident and may have represented adherent secretions.  There is no evidence for pneumothorax or pleural effusions.  The right chest wall mass centered at the destroyed right 3rd rib measures approximately 7.3 cm (image 153, series 4) compared to 6.6 cm on the prior study.  No new bone lesions are identified.     The liver is normal in size and is homogeneous in density with no focal liver lesions identified.  The gallbladder is present and appears grossly unremarkable.  There is no evidence for intrahepatic or extrahepatic biliary dilatation.  The spleen, stomach, and pancreas appear unremarkable.  The adrenal glands are not enlarged.  The kidneys are normal in size, position, and contour and there is no evidence for abnormal renal masses or hydronephrosis.  The kidneys are noted to concentrate contrast symmetrically.  The abdominal aorta demonstrates mild ectasia without aneurysmal dilatation.  No para-aortic lymphadenopathy is identified.  No dilated loops of bowel are evident.  There is a trace amount of ascites.  The urinary bladder appears unremarkable.  There is no evidence for pelvic or inguinal lymphadenopathy.        Assessment:       1. Adenocarcinoma, lung, right    2. Tobacco abuse    3.  Nicotine dependence    4. Cancer related pain    5. Therapeutic opioid induced constipation    6. Essential hypertension    7. Type 2 diabetes mellitus without complication, without long-term current use of insulin    8. History of CVA (cerebrovascular accident)    9. Dyslipidemia    10. Hypokalemia           Plan:     NSCLC - biopsy of the right lung on 4/01/20 showed adenocarcinoma  -PET/CT on 4/09/20 showed 6.5 x 3.7 cm pulmonary mass in the anterior segment of the right upper lobe abutting the mediastinal pleura, large right pretracheal soft tissue mass measuring 4.4 x 3.8 cm, left prevascular lymph node measuring 1.0 cm, few scattered bilateral poorly defined subcentimeter pulmonary nodules, and a right chest wall soft tissue lesion partially eroding the lateral right 3rd rib measuring 2 x 2 cm  -The patient received 3 cycles of Carboplatin, Pemetrexed and Pembrolizumab   -CT of the chest on 7/09/20 showed progression of the right chest wall mass now measuring 6.6cm  -Pt discussed at thoracic tumor board with no intra pulmonary intervention which could improve the patient's respiratory status  -The patient completed treatment with radiation at [facility] completed 9/01/20 with total of 3000 cGy.   -STRATA trial showed a PTEN mutation indicating possibility of entering into a trial though the [facility]MATCH  -Will proceed currently with treatment with cycle 2 of Docetaxel and Ramucirumab  -Pt to take dex 8mg BID the day before chemo, day of chemo and day after chemo.    Tobacco Abuse - refill for Bupropion  -Referral to smoking cessation    Cancer Related pain - Pt managed by Mrs Herrera in palliative care.  -MS Contin increased to 100mg every 8 hours  -Pt also taking percocet 10-325mg every 4 hours.    -Palliative care managing    Opioid Induced Constipation - Constipation improved with miralax  -Will monitor    HTN - pt on HCTZ and metoprolol  -Will monitor    HLD - pt on lipitor  -PCP managing    DMII - pt's A1C is  10.1 on 3/13/20  -Pt on metformin  -Will monitor    CAD -  Pt on statin, ASA, metoprolol    CVA - MRI of the brain on 4/09/20 showed old infarcts in the right superior frontal lobe and remote lacunar-type infarcts of the bilateral lentiform nuclei and right thalamus  -Pt already on ASA and statin  -Pt saw Dr Bennett on 5/21/20 who recommended continued ASA, statin and BP control    Hypokaliemia - may be from HCTZ  -Potassium 3.4 on 9/23/20  -Will continue 40mEq daily of potassium    Advance Care Planning     Power of   I initiated the process of advance care planning today and explained the importance of this process to the patient.  I introduced the concept of advance directives to the patient, as well. Then the patient received detailed information about the importance of designating a Health Care Power of  (HCPOA). He was also instructed to communicate with this person about their wishes for future healthcare, should he become sick and lose decision-making capacity. The patient has not previously appointed a HCPOA. After our discussion, the patient has decided to complete a HCPOA and will bring the form completed to his next clinic appointment.                -Follow Up - 3 weeks with CBC,. CMP, UA and TSH with cycle 3 of chemo    Richar Avendaño MD  Hematology and Oncology  [facility]  Office:[phone]  Fax: [phone]

## 2020-09-23 ENCOUNTER — OFFICE VISIT (OUTPATIENT)
Dept: HEMATOLOGY/ONCOLOGY | Facility: CLINIC | Age: 55
End: 2020-09-23
Payer: MEDICAID

## 2020-09-23 ENCOUNTER — INFUSION (OUTPATIENT)
Dept: INFUSION THERAPY | Facility: HOSPITAL | Age: 55
End: 2020-09-23
Attending: INTERNAL MEDICINE
Payer: MEDICAID

## 2020-09-23 VITALS
RESPIRATION RATE: 18 BRPM | DIASTOLIC BLOOD PRESSURE: 68 MMHG | HEART RATE: 103 BPM | TEMPERATURE: 99 F | OXYGEN SATURATION: 97 % | SYSTOLIC BLOOD PRESSURE: 109 MMHG

## 2020-09-23 VITALS
TEMPERATURE: 99 F | DIASTOLIC BLOOD PRESSURE: 62 MMHG | BODY MASS INDEX: 25.23 KG/M2 | OXYGEN SATURATION: 99 % | WEIGHT: 166.44 LBS | HEART RATE: 104 BPM | HEIGHT: 68 IN | SYSTOLIC BLOOD PRESSURE: 99 MMHG

## 2020-09-23 DIAGNOSIS — E78.5 DYSLIPIDEMIA: ICD-10-CM

## 2020-09-23 DIAGNOSIS — T40.2X5A THERAPEUTIC OPIOID INDUCED CONSTIPATION: ICD-10-CM

## 2020-09-23 DIAGNOSIS — C34.91 ADENOCARCINOMA, LUNG, RIGHT: Primary | ICD-10-CM

## 2020-09-23 DIAGNOSIS — K59.03 THERAPEUTIC OPIOID INDUCED CONSTIPATION: ICD-10-CM

## 2020-09-23 DIAGNOSIS — Z86.73 HISTORY OF CVA (CEREBROVASCULAR ACCIDENT): ICD-10-CM

## 2020-09-23 DIAGNOSIS — F17.200 NICOTINE DEPENDENCE: ICD-10-CM

## 2020-09-23 DIAGNOSIS — E11.9 TYPE 2 DIABETES MELLITUS WITHOUT COMPLICATION, WITHOUT LONG-TERM CURRENT USE OF INSULIN: ICD-10-CM

## 2020-09-23 DIAGNOSIS — G89.3 CANCER RELATED PAIN: ICD-10-CM

## 2020-09-23 DIAGNOSIS — E87.6 HYPOKALEMIA: ICD-10-CM

## 2020-09-23 DIAGNOSIS — Z72.0 TOBACCO ABUSE: ICD-10-CM

## 2020-09-23 DIAGNOSIS — I10 ESSENTIAL HYPERTENSION: ICD-10-CM

## 2020-09-23 PROCEDURE — 96413 CHEMO IV INFUSION 1 HR: CPT

## 2020-09-23 PROCEDURE — 99215 OFFICE O/P EST HI 40 MIN: CPT | Mod: PBBFAC,25 | Performed by: INTERNAL MEDICINE

## 2020-09-23 PROCEDURE — 63600175 PHARM REV CODE 636 W HCPCS: Mod: JG | Performed by: INTERNAL MEDICINE

## 2020-09-23 PROCEDURE — A4216 STERILE WATER/SALINE, 10 ML: HCPCS | Performed by: INTERNAL MEDICINE

## 2020-09-23 PROCEDURE — 96417 CHEMO IV INFUS EACH ADDL SEQ: CPT

## 2020-09-23 PROCEDURE — 99214 OFFICE O/P EST MOD 30 MIN: CPT | Mod: S$PBB,,, | Performed by: INTERNAL MEDICINE

## 2020-09-23 PROCEDURE — 2024F PR 7 FIELD PHOTOS WITH INTERP/ REVIEW: ICD-10-PCS | Mod: ,,, | Performed by: INTERNAL MEDICINE

## 2020-09-23 PROCEDURE — 25000003 PHARM REV CODE 250: Performed by: INTERNAL MEDICINE

## 2020-09-23 PROCEDURE — 99214 PR OFFICE/OUTPT VISIT, EST, LEVL IV, 30-39 MIN: ICD-10-PCS | Mod: S$PBB,,, | Performed by: INTERNAL MEDICINE

## 2020-09-23 PROCEDURE — 96367 TX/PROPH/DG ADDL SEQ IV INF: CPT

## 2020-09-23 PROCEDURE — 99999 PR PBB SHADOW E&M-EST. PATIENT-LVL V: CPT | Mod: PBBFAC,,, | Performed by: INTERNAL MEDICINE

## 2020-09-23 PROCEDURE — 99999 PR PBB SHADOW E&M-EST. PATIENT-LVL V: ICD-10-PCS | Mod: PBBFAC,,, | Performed by: INTERNAL MEDICINE

## 2020-09-23 PROCEDURE — 2024F 7 FLD RTA PHOTO EVC RTNOPTHY: CPT | Mod: ,,, | Performed by: INTERNAL MEDICINE

## 2020-09-23 RX ORDER — BUPROPION HYDROCHLORIDE 150 MG/1
150 TABLET, EXTENDED RELEASE ORAL 2 TIMES DAILY
Qty: 60 TABLET | Refills: 0 | Status: SHIPPED | OUTPATIENT
Start: 2020-09-23 | End: 2021-01-06

## 2020-09-23 RX ORDER — HEPARIN 100 UNIT/ML
500 SYRINGE INTRAVENOUS
Status: DISCONTINUED | OUTPATIENT
Start: 2020-09-23 | End: 2020-09-23 | Stop reason: HOSPADM

## 2020-09-23 RX ORDER — HEPARIN 100 UNIT/ML
500 SYRINGE INTRAVENOUS
Status: CANCELLED | OUTPATIENT
Start: 2020-09-23

## 2020-09-23 RX ORDER — SODIUM CHLORIDE 0.9 % (FLUSH) 0.9 %
10 SYRINGE (ML) INJECTION
Status: DISCONTINUED | OUTPATIENT
Start: 2020-09-23 | End: 2020-09-23 | Stop reason: HOSPADM

## 2020-09-23 RX ORDER — SODIUM CHLORIDE 0.9 % (FLUSH) 0.9 %
10 SYRINGE (ML) INJECTION
Status: CANCELLED | OUTPATIENT
Start: 2020-09-23

## 2020-09-23 RX ORDER — DEXAMETHASONE 4 MG/1
8 TABLET ORAL EVERY 12 HOURS
Qty: 120 TABLET | Refills: 0 | Status: SHIPPED | OUTPATIENT
Start: 2020-09-23 | End: 2021-09-23

## 2020-09-23 RX ADMIN — DOCETAXEL 145 MG: 20 INJECTION, SOLUTION, CONCENTRATE INTRAVENOUS at 02:09

## 2020-09-23 RX ADMIN — DIPHENHYDRAMINE HYDROCHLORIDE 50 MG: 50 INJECTION, SOLUTION INTRAMUSCULAR; INTRAVENOUS at 01:09

## 2020-09-23 RX ADMIN — DEXAMETHASONE SODIUM PHOSPHATE 20 MG: 10 INJECTION, SOLUTION INTRAMUSCULAR; INTRAVENOUS at 02:09

## 2020-09-23 RX ADMIN — SODIUM CHLORIDE 755 MG: 0.9 INJECTION, SOLUTION INTRAVENOUS at 12:09

## 2020-09-23 RX ADMIN — Medication 10 ML: at 03:09

## 2020-09-23 RX ADMIN — HEPARIN 500 UNITS: 100 SYRINGE at 03:09

## 2020-09-23 NOTE — PLAN OF CARE
Patient arrived to unit for C2 Cyramza, Taxotere. Plan of care reviewed, patient agreeable to plan. Patient tolerated treatment well. No sign of reaction noted. VSS. Patient received discharge instructions and follow up appointments. Patient instructed to return 10/14/20 for labs, Dr. Avendaño follow up, and chemo. Verbalized understanding and ambulated unassisted off unit accompanied by his spouse. Patient in NAD at time of discharge.

## 2020-09-23 NOTE — Clinical Note
The patient can proceed with treatment today as long as his renal and hepatic function are OK.  The patient will need an appointment with the smoking cessation program.  He will need a return appt with me 10/14/20 with CBC, CMP, urinalysis and TSH prior.  He will need chemo that day.

## 2020-09-29 DIAGNOSIS — R52 PAIN MANAGEMENT: ICD-10-CM

## 2020-09-29 DIAGNOSIS — C34.91 ADENOCARCINOMA, LUNG, RIGHT: ICD-10-CM

## 2020-09-29 DIAGNOSIS — Z51.5 PALLIATIVE CARE ENCOUNTER: ICD-10-CM

## 2020-09-29 RX ORDER — OXYCODONE AND ACETAMINOPHEN 10; 325 MG/1; MG/1
1 TABLET ORAL EVERY 4 HOURS PRN
Qty: 90 TABLET | Refills: 0 | Status: SHIPPED | OUTPATIENT
Start: 2020-09-29 | End: 2020-10-27 | Stop reason: SDUPTHER

## 2020-10-02 ENCOUNTER — DOCUMENTATION ONLY (OUTPATIENT)
Dept: HEMATOLOGY/ONCOLOGY | Facility: CLINIC | Age: 55
End: 2020-10-02

## 2020-10-09 ENCOUNTER — TELEPHONE (OUTPATIENT)
Dept: HEMATOLOGY/ONCOLOGY | Facility: CLINIC | Age: 55
End: 2020-10-09

## 2020-10-09 NOTE — TELEPHONE ENCOUNTER
I called and the patient';s michael stated the patient was feeling congested, had a cough and was having sweats.  I recommended he be evaluated at the urgent care clinic located at [address] [place] [address].  She stated she would bring him there for evaluation and COVID-19 testing.  She expressed understanding.  All questions were answered to hersatisfaction.    Richar Avendaño MD  Hematology and Oncology  [facility]  Office:[phone]  Fax: [phone]

## 2020-10-09 NOTE — TELEPHONE ENCOUNTER
----- Message from Diana Lakhwinder sent at 10/9/2020  8:54 AM CDT -----  Regarding: PT  Contact: PT's michael Sanchez  PT's michael called bc the PT has been congested, nose has been runny and he's been sweating profusely and she doesn't know what they should do. Said she doesn't have a thermometer to take his temp so she doesn't know if he has a fever or not. Please call back     Callback: [phone]

## 2020-10-09 NOTE — PROGRESS NOTES
PATIENT: Luis Felipe Hawk  MRN: [id number]  DATE: 10/14/2020      Diagnosis:   1. Adenocarcinoma, lung, right    2. Congestion of respiratory tract    3. Tobacco abuse    4. Cancer related pain    5. Therapeutic opioid induced constipation    6. Essential hypertension    7. Type 2 diabetes mellitus without complication, without long-term current use of insulin    8. History of CVA (cerebrovascular accident)    9. Dyslipidemia    10. Hypokalemia        Chief Complaint: Follow-up (adenocarcinoma,lung, right)    Oncologic History:      Oncologic History 11/21/19 CXR  3/13/20 CT Chest  4/01/20 IR biopsy  4/09/20 PET/CT  5/07/20 PORT placement  7/09/20 CT Chest   7/22/20 Chest mass Biopsy  7/24/20 PET/CT  9/10/20 CT C/A/P    Oncologic Treatment 5/13/20 B 12 injection  5/20/20 Carboplatin/Alimta/Pembrolizumab cycle 3  9/02/20 Docetaxel/Ramucirumab - present s/p cycle 1    Pathology 4/01/20 - adenocarcinoma suggestive of a possible gastrointestinal origin - PD-L1, ROS-1 and ALK negative  7/22/20 - moderately differentiated adenocarcinoma      Oncologic History:   The patient was initially seen by Dr Carmona on 11/21/19 for a cough and underwent a CXR showing a soft tissue mass in the anterior segment of the right upper lobe that measures at least 4 cm in cranial-caudad extent.  Per chart review an attempt to contact MR Hawk was mad on multiple occasions in order to have a CT of the chest done.  CT of the chest was eventually done on 3/13/20 and showed enlarged right paratracheal LN measuring 2.8 cm, slightly enlarged right hilar nodes, a mass within the anterior segment of the right upper lobe measuring 3.9 x 4.7 cm abutting the right suprahilar region and obliterating the anterior segmental bronchus.  The patient underwent IR guided biopsy of the right lung on 4/01/20 showing adenocarcinoma suggestive of a possible gastrointestinal origin.  MRI of the brain on 4/09/20 showed no evidence of intracranial metastatic  disease, remote small infarcts within the right superior frontal lobe and remote lacunar-type infarcts of the bilateral lentiform nuclei and right thalamus.  PET/CT on 4/09/20 showed a 6.5 x 3.7 cm pulmonary mass in the anterior segment of the right upper lobe abutting the mediastinal pleura, large right pretracheal soft tissue mass measuring 4.4 x 3.8 cm, left prevascular lymph node measuring 1.0 cm, few scattered bilateral poorly defined subcentimeter pulmonary nodules, and a right chest wall soft tissue lesion partially eroding the lateral right 3rd rib measuring 2 x 2 cm.  The patient had a PORT placed in the right chest on 5/07/20. He received his B12 injection on 5/13/20.  He also underwent US of the carotid on 5/14/20 showing 0-19% internal carotid artery stenosis on the right with a lymph node in the proximal neck lateral to the common carotid artery measuring 2.8 x 1.7 x 3.6 cm.  On the left there is 0-19% stenosis.  His PORT was placed on 5/07/20.  The patient saw Dr Bennett with vascular neurology on 5/21/20 for CVA seen on MRI of the brain 4/09/20.  Recommendations were made for ASA, statin and BP control to prevent secondary stroke.  The patient was then see in the ER on 5/22/20 with concern for PNA and treated with Levaquin.  He then saw Nellie Herrera with palliative care on 6/03/20 for symptoms management.  MS Contin 60 mg q 8 hours, Percocet 5/325 1 tablet q 4 hours prn for breakthrough pain/cough.   The patient underwent a CT of the chest on 7/09/20 showing a right paratracheal lymph node measuring 1.5 cm in short axis, previously 2.4 cm, a right upper lobe mass measuring 5.7 cm which is unchanged, patchy ground-glass opacities bilaterally, a 1.2 cm soft tissue density focus along the right posterior aspect of the trachea at the level of C7-T1, and a right chest wall mass centered at the destroyed 3rd rib measuring 6.6 cm, previously 4.3 cm.   The patient underwent a right chest wall mass biopsy  on 7/22/20.  He underwent a PET/CT on 7/24/20 showing a right upper lobe paramediastinal mass measuring 5.8 x 3.9 cm previously 6.6 x 4.1; resolution in radiotracer uptake in lymph nodes within the right supraclavicular, right paratracheal, and prevascular stations; increased size of a hypermetabolic soft tissue mass centered within the right lateral 3rd rib measuring 6.5 x 5.7 cm previously 2.9 x 2.1 cm.  The patient underwent restaging scans on 9/10/20 showing an enlarged lymph node anterior to the stephanie measuring 1.0 cm in short axis compared to 1.5 cm on the prior study, a right upper lobe lung mass measuring 4.7 cm compared to 5.7 cm on the prior study, right chest wall mass centered at the destroyed right 3rd rib measures approximately 7.3 cm compared to 6.6 cm on the prior study.  Subjective:    Interval History: Mr. Hawk is a 54 y.o. male with HTN, HLD, DMII, CAD who presents to follow up for NSCLC adenocarcinoma.  The patient states he continues to have pain in his right chest 6/10 with improvement to 3-4 with percocet.  He endorses coughing up yellow phlegm on occasion.  He denies CP, fever.  He endorses SOB on occasion.  The patient denies fever, chills, night sweats, weight loss, new lumps or bumps, easy bruising or bleeding.  The patient denies abdominal pain, N/V, constipation, diarrhea.    Past Medical History:   Past Medical History:   Diagnosis Date    CAD (coronary artery disease) 2013    one stent placed    DMII (diabetes mellitus, type 2)     Hypertension     Lung cancer     Mass of right lung     Mass of upper lobe of right lung 3/16/2020    3/13/2020 CT chest: Right upper lobe mass obliterating the anterior segmental bronchus. This is associated with right hilar and paramediastinal adenopathy.  This is concerning for bronchogenic carcinoma.  Sampling recommended.       Past Surgical HIstory:   Past Surgical History:   Procedure Laterality Date    CV STENT      X 1    INSERTION OF  TUNNELED CENTRAL VENOUS CATHETER (CVC) WITH SUBCUTANEOUS PORT N/A 5/7/2020    Procedure: INSERTION, PORT-A-CATH;  Surgeon: Dosc Diagnostic Provider;  Location: [facility] OR;  Service: Radiology;  Laterality: N/A;  RN PREOP 5/7/20--rapid covid done    LUNG BIOPSY N/A 4/1/2020    Procedure: BIOPSY, LUNG;  Surgeon: Dosc Diagnostic Provider;  Location: [facility] OR;  Service: Radiology;  Laterality: N/A;  730AM STARTRN PHONE PREOP 3/31/2020    LUNG BIOPSY N/A 7/22/2020    Procedure: BIOPSY, LUNG;  Surgeon: [facility] Diagnostic Provider;  Location: [facility] OR;  Service: Radiology;  Laterality: N/A;  9AM START  RN PREOP 7/21/2020--COVID NEGATIVE ON 7/21       Family History:   Family History   Problem Relation Age of Onset    Peripheral vascular disease Mother     Heart disease Father     No Known Problems Brother     No Known Problems Brother     No Known Problems Son     No Known Problems Son     No Known Problems Daughter     No Known Problems Daughter     No Known Problems Daughter        Social History:  reports that he has been smoking cigarettes. He has a 60.00 pack-year smoking history. He has never used smokeless tobacco. He reports current alcohol use of about 3.0 standard drinks of alcohol per week. He reports that he does not use drugs.    Allergies:  Review of patient's allergies indicates:   Allergen Reactions    Tramadol Other (See Comments)     Chest burning  Chest burning       Medications:  Current Outpatient Medications   Medication Sig Dispense Refill    acetaminophen (TYLENOL) 325 MG tablet Take 325 mg by mouth every 6 (six) hours as needed for Pain.      albuterol (PROVENTIL/VENTOLIN HFA) 90 mcg/actuation inhaler INHALE 2 PUFFS BY MOUTH EVERY 6 HOURS AS NEEDED FOR WHEEZING 18 g 11    aspirin (ECOTRIN) 81 MG EC tablet Take 1 tablet (81 mg total) by mouth once daily. 30 tablet 11    atorvastatin (LIPITOR) 80 MG tablet Take 1 tablet (80 mg total) by mouth once daily. 90 tablet 3    blood-glucose meter  kit To check BG 1 times daily, to use with insurance preferred meter 1 each 0    buPROPion (WELLBUTRIN SR) 150 MG TBSR 12 hr tablet Take 1 tablet (150 mg total) by mouth 2 (two) times daily. 60 tablet 0    cyproheptadine (PERIACTIN) 4 mg tablet Take 1 tablet (4 mg total) by mouth 4 (four) times daily. 120 tablet 0    dexAMETHasone (DECADRON) 4 MG Tab Take 2 tablets (8 mg total) by mouth every 12 (twelve) hours. Take 2 tablets (8mg total) by mouth every 12 hours the day before chemo, the day of chemo and the day after chemo. 120 tablet 0    folic acid (FOLVITE) 1 MG tablet Take 1 tablet (1 mg total) by mouth once daily. 30 tablet 6    gabapentin (NEURONTIN) 600 MG tablet Take 1 tablet (600 mg total) by mouth 3 (three) times daily. 30 tablet 2    hydroCHLOROthiazide (HYDRODIURIL) 25 MG tablet TK 1 T PO ONE TIME PER DAY FOR BP 90 tablet 3    metFORMIN (GLUCOPHAGE) 500 MG tablet 1 po qAM x 1 wk; then 1 po BID x 1 wk; then 2 AM/1 PM x 1 wk; then 2 BID maintenance. Take with food 120 tablet 5    metoprolol succinate (TOPROL-XL) 25 MG 24 hr tablet Take 1 tablet (25 mg total) by mouth once daily. 90 tablet 3    morphine (MS CONTIN) 100 MG 12 hr tablet Take 1 tablet (100 mg total) by mouth every 8 (eight) hours. 90 tablet 0    ondansetron (ZOFRAN) 4 MG tablet Take 1 tablet (4 mg total) by mouth every 6 (six) hours as needed for Nausea. 90 tablet 6    oxyCODONE-acetaminophen (PERCOCET)  mg per tablet Take 1 tablet by mouth every 4 (four) hours as needed for Pain. 90 tablet 0    polyethylene glycol (MIRALAX) 17 gram PwPk Take 17 g by mouth 2 (two) times daily as needed (Constipation). 30 each 6    potassium chloride SA (K-DUR,KLOR-CON) 20 MEQ tablet Take 2 tablets (40 mEq total) by mouth once daily. 30 tablet 11    senna-docusate 8.6-50 mg (PERICOLACE) 8.6-50 mg per tablet Take 2 tablets by mouth once daily. 60 tablet 6    amoxicillin-clavulanate 875-125mg (AUGMENTIN) 875-125 mg per tablet Take one tablet  "by mouth every 12 hours for 5 days 10 tablet 0    melatonin (MELATIN) 3 mg tablet Take 1 tablet (3 mg total) by mouth nightly.  0    traZODone (DESYREL) 50 MG tablet Take 1 tablet (50 mg total) by mouth every evening. 15 tablet 3     No current facility-administered medications for this visit.        Review of Systems   Constitutional: Negative for appetite change, chills, diaphoresis, fatigue, fever and unexpected weight change.   Respiratory: Positive for cough and shortness of breath (on occasion, chronic).    Cardiovascular: Negative for chest pain and palpitations.   Gastrointestinal: Negative for abdominal pain, constipation, diarrhea, nausea and vomiting.   Musculoskeletal:        Pain in his lateral right chest   Skin: Negative for color change and rash.   Neurological: Negative for headaches.   Hematological: Negative for adenopathy. Does not bruise/bleed easily.       ECOG Performance Status: 1    Objective:      Vitals:   Vitals:    10/14/20 1051   BP: 120/78   BP Location: Left arm   Patient Position: Sitting   BP Method: Medium (Automatic)   Pulse: 94   Temp: 97.5 °F (36.4 °C)   TempSrc: Oral   SpO2: 98%   Weight: 73.6 kg (162 lb 4.1 oz)   Height: 5' 8" (1.727 m)       Physical Exam  Constitutional:       General: He is not in acute distress.     Appearance: He is well-developed. He is not diaphoretic.   HENT:      Head: Normocephalic and atraumatic.   Neck:      Musculoskeletal: Normal range of motion.   Cardiovascular:      Rate and Rhythm: Normal rate and regular rhythm.      Heart sounds: Normal heart sounds. No murmur. No friction rub. No gallop.    Pulmonary:      Effort: Pulmonary effort is normal. No respiratory distress.      Breath sounds: Normal breath sounds. No wheezing or rales.   Chest:      Chest wall: No tenderness.   Abdominal:      General: Bowel sounds are normal. There is no distension.      Palpations: Abdomen is soft. There is no mass.      Tenderness: There is no abdominal " tenderness. There is no rebound.   Musculoskeletal:      Comments: PORT in right chest   Lymphadenopathy:      Cervical: No cervical adenopathy.      Upper Body:      Right upper body: No supraclavicular adenopathy.      Left upper body: No supraclavicular adenopathy.   Skin:     General: Skin is warm and dry.      Findings: No erythema or rash.   Neurological:      Mental Status: He is alert and oriented to person, place, and time.   Psychiatric:         Behavior: Behavior normal.         Laboratory Data:  Lab Visit on 10/14/2020   Component Date Value Ref Range Status    Specimen UA 10/14/2020 Urine, Clean Catch   Final    Color, UA 10/14/2020 Yellow  Yellow, Straw, Julieta Final    Appearance, UA 10/14/2020 Clear  Clear Final    pH, UA 10/14/2020 6.0  5.0 - 8.0 Final    Specific [place], UA 10/14/2020 1.010  1.005 - 1.030 Final    Protein, UA 10/14/2020 Negative  Negative Final    Comment: Recommend a 24 hour urine protein or a urine   protein/creatinine ratio if globulin induced proteinuria is  clinically suspected.      Glucose, UA 10/14/2020 Negative  Negative Final    Ketones, UA 10/14/2020 Negative  Negative Final    Bilirubin (UA) 10/14/2020 Negative  Negative Final    Occult Blood UA 10/14/2020 Negative  Negative Final    Nitrite, UA 10/14/2020 Negative  Negative Final    Urobilinogen, UA 10/14/2020 Negative  <2.0 EU/dL Final    Leukocytes, UA 10/14/2020 Trace* Negative Final    WBC, UA 10/14/2020 3  0 - 5 /hpf Final    Microscopic Comment 10/14/2020 SEE COMMENT   Final    Comment: Other formed elements not mentioned in the report are not   present in the microscopic examination.      Lab Visit on 10/14/2020   Component Date Value Ref Range Status    WBC 10/14/2020 9.62  3.90 - 12.70 K/uL Final    RBC 10/14/2020 3.82* 4.60 - 6.20 M/uL Final    Hemoglobin 10/14/2020 10.8* 14.0 - 18.0 g/dL Final    Hematocrit 10/14/2020 34.7* 40.0 - 54.0 % Final    Mean Corpuscular Volume 10/14/2020 91  82  - 98 fL Final    Mean Corpuscular Hemoglobin 10/14/2020 28.3  27.0 - 31.0 pg Final    Mean Corpuscular Hemoglobin Conc 10/14/2020 31.1* 32.0 - 36.0 g/dL Final    RDW 10/14/2020 16.9* 11.5 - 14.5 % Final    Platelets 10/14/2020 447* 150 - 350 K/uL Final    MPV 10/14/2020 9.5  9.2 - 12.9 fL Final    Immature Granulocytes 10/14/2020 0.4  0.0 - 0.5 % Final    Gran # (ANC) 10/14/2020 6.7  1.8 - 7.7 K/uL Final    Immature Grans (Abs) 10/14/2020 0.04  0.00 - 0.04 K/uL Final    Comment: Mild elevation in immature granulocytes is non specific and   can be seen in a variety of conditions including stress response,   acute inflammation, trauma and pregnancy. Correlation with other   laboratory and clinical findings is essential.      Lymph # 10/14/2020 2.2  1.0 - 4.8 K/uL Final    Mono # 10/14/2020 0.7  0.3 - 1.0 K/uL Final    Eos # 10/14/2020 0.0  0.0 - 0.5 K/uL Final    Baso # 10/14/2020 0.04  0.00 - 0.20 K/uL Final    nRBC 10/14/2020 0  0 /100 WBC Final    Gran% 10/14/2020 69.6  38.0 - 73.0 % Final    Lymph% 10/14/2020 22.7  18.0 - 48.0 % Final    Mono% 10/14/2020 6.9  4.0 - 15.0 % Final    Eosinophil% 10/14/2020 0.0  0.0 - 8.0 % Final    Basophil% 10/14/2020 0.4  0.0 - 1.9 % Final    Differential Method 10/14/2020 Automated   Final    Sodium 10/14/2020 138  136 - 145 mmol/L Final    Potassium 10/14/2020 4.5  3.5 - 5.1 mmol/L Final    Chloride 10/14/2020 101  95 - 110 mmol/L Final    CO2 10/14/2020 27  23 - 29 mmol/L Final    Glucose 10/14/2020 198* 70 - 110 mg/dL Final    BUN, Bld 10/14/2020 13  6 - 20 mg/dL Final    Creatinine 10/14/2020 0.8  0.5 - 1.4 mg/dL Final    Calcium 10/14/2020 8.7  8.7 - 10.5 mg/dL Final    Total Protein 10/14/2020 6.7  6.0 - 8.4 g/dL Final    Albumin 10/14/2020 2.9* 3.5 - 5.2 g/dL Final    Total Bilirubin 10/14/2020 0.2  0.1 - 1.0 mg/dL Final    Comment: For infants and newborns, interpretation of results should be based  on gestational age, weight and in agreement  with clinical  observations.  Premature Infant recommended reference ranges:  Up to 24 hours.............<8.0 mg/dL  Up to 48 hours............<12.0 mg/dL  3-5 days..................<15.0 mg/dL  6-29 days.................<15.0 mg/dL      Alkaline Phosphatase 10/14/2020 126  55 - 135 U/L Final    AST 10/14/2020 9* 10 - 40 U/L Final    ALT 10/14/2020 9* 10 - 44 U/L Final    Anion Gap 10/14/2020 10  8 - 16 mmol/L Final    eGFR if African American 10/14/2020 >60  >60 mL/min/1.73 m^2 Final    eGFR if non African American 10/14/2020 >60  >60 mL/min/1.73 m^2 Final    Comment: Calculation used to obtain the estimated glomerular filtration  rate (eGFR) is the CKD-EPI equation.       TSH 10/14/2020 1.242  0.400 - 4.000 uIU/mL Final         Imaging: CT Chest 9/10/20     There is a right internal jugular Port-A-Cath present with the tip of the catheter within the superior vena cava near the cavoatrial junction.  The structures at the base of the neck appear grossly unremarkable.  The heart is not enlarged.  The thoracic aorta is normal in caliber and there is no evidence for thoracic aortic aneurysm or aortic dissection.  There is a borderline enlarged lymph node anterior to the stephanie measuring 1.0 cm in short axis.  (Image 39, series 2) compared to 1.5 cm on the prior study.  There is a right upper lobe lung mass present measuring 4.7 cm (image 48, series 2) compared to 5.7 cm on the prior study.  There is some adjacent ground-glass consolidation present as well as mild atelectasis.  No new lung nodules are identified.  The previously identified soft tissue density focus along the posterior aspect of the trachea is no longer evident and may have represented adherent secretions.  There is no evidence for pneumothorax or pleural effusions.  The right chest wall mass centered at the destroyed right 3rd rib measures approximately 7.3 cm (image 153, series 4) compared to 6.6 cm on the prior study.  No new bone lesions are  identified.     The liver is normal in size and is homogeneous in density with no focal liver lesions identified.  The gallbladder is present and appears grossly unremarkable.  There is no evidence for intrahepatic or extrahepatic biliary dilatation.  The spleen, stomach, and pancreas appear unremarkable.  The adrenal glands are not enlarged.  The kidneys are normal in size, position, and contour and there is no evidence for abnormal renal masses or hydronephrosis.  The kidneys are noted to concentrate contrast symmetrically.  The abdominal aorta demonstrates mild ectasia without aneurysmal dilatation.  No para-aortic lymphadenopathy is identified.  No dilated loops of bowel are evident.  There is a trace amount of ascites.  The urinary bladder appears unremarkable.  There is no evidence for pelvic or inguinal lymphadenopathy.        Assessment:       1. Adenocarcinoma, lung, right    2. Congestion of respiratory tract    3. Tobacco abuse    4. Cancer related pain    5. Therapeutic opioid induced constipation    6. Essential hypertension    7. Type 2 diabetes mellitus without complication, without long-term current use of insulin    8. History of CVA (cerebrovascular accident)    9. Dyslipidemia    10. Hypokalemia           Plan:     NSCLC - biopsy of the right lung on 4/01/20 showed adenocarcinoma  -PET/CT on 4/09/20 showed 6.5 x 3.7 cm pulmonary mass in the anterior segment of the right upper lobe abutting the mediastinal pleura, large right pretracheal soft tissue mass measuring 4.4 x 3.8 cm, left prevascular lymph node measuring 1.0 cm, few scattered bilateral poorly defined subcentimeter pulmonary nodules, and a right chest wall soft tissue lesion partially eroding the lateral right 3rd rib measuring 2 x 2 cm  -The patient received 3 cycles of Carboplatin, Pemetrexed and Pembrolizumab   -CT of the chest on 7/09/20 showed progression of the right chest wall mass now measuring 6.6cm  -Pt discussed at thoracic  tumor board with no intra pulmonary intervention which could improve the patient's respiratory status  -The patient completed treatment with radiation at [facility] completed 9/01/20 with total of 3000 cGy.   -STRATA trial showed a PTEN mutation indicating possibility of entering into a trial though the [facility]CI-MATCH  -Will proceed currently with treatment with cycle 3 of Docetaxel and Ramucirumab  -Pt to take dex 8mg BID the day before chemo, day of chemo and day after chemo.    Congestion - pt with congestion  -Pt instructed to try Claritin during the day and benadryl at night  -Will monitor    Tobacco Abuse - refill for Bupropion  -Referral to smoking cessation    Cancer Related pain - Pt managed by Mrs Herrera in palliative care.  -MS Contin increased to 100mg every 8 hours  -Pt also taking percocet 10-325mg every 4 hours.   -Pt also on gabapentin   -Palliative care managing    Opioid Induced Constipation - Constipation improved with miralax  -Will monitor    HTN - pt on HCTZ and metoprolol  -Will monitor    HLD - pt on lipitor  -PCP managing    DMII - pt's A1C is 10.1 on 3/13/20  -Pt on metformin  -Will monitor    CAD -  Pt on statin, ASA, metoprolol    CVA - MRI of the brain on 4/09/20 showed old infarcts in the right superior frontal lobe and remote lacunar-type infarcts of the bilateral lentiform nuclei and right thalamus  -Pt already on ASA and statin  -Pt saw Dr Bennett on 5/21/20 who recommended continued ASA, statin and BP control    Hypokaliemia - may be from HCTZ  -Potassium 4.5 on 10/14/20  -Will continue 40mEq daily of potassium    Advance Care Planning     Power of   I initiated the process of advance care planning today and explained the importance of this process to the patient.  I introduced the concept of advance directives to the patient, as well. Then the patient received detailed information about the importance of designating a Health Care Power of  (HCPOA). He was also instructed to  communicate with this person about their wishes for future healthcare, should he become sick and lose decision-making capacity. The patient has not previously appointed a HCPOA. After our discussion, the patient has decided to complete a HCPOA and will bring the form completed to his next clinic appointment.                -Follow Up - 3 weeks with CBC,. CMP, UA and TSH with cycle 4 of chemo    Richar Avendaño MD  Hematology and Oncology  [facility]  Office:[phone]  Fax: [phone]

## 2020-10-14 ENCOUNTER — OFFICE VISIT (OUTPATIENT)
Dept: HEMATOLOGY/ONCOLOGY | Facility: CLINIC | Age: 55
End: 2020-10-14
Payer: MEDICAID

## 2020-10-14 ENCOUNTER — OFFICE VISIT (OUTPATIENT)
Dept: PALLIATIVE MEDICINE | Facility: CLINIC | Age: 55
End: 2020-10-14
Payer: MEDICAID

## 2020-10-14 ENCOUNTER — DOCUMENTATION ONLY (OUTPATIENT)
Dept: HEMATOLOGY/ONCOLOGY | Facility: CLINIC | Age: 55
End: 2020-10-14

## 2020-10-14 ENCOUNTER — INFUSION (OUTPATIENT)
Dept: INFUSION THERAPY | Facility: HOSPITAL | Age: 55
End: 2020-10-14
Attending: INTERNAL MEDICINE
Payer: MEDICAID

## 2020-10-14 VITALS
BODY MASS INDEX: 24.59 KG/M2 | HEART RATE: 94 BPM | WEIGHT: 162.25 LBS | OXYGEN SATURATION: 98 % | SYSTOLIC BLOOD PRESSURE: 120 MMHG | HEIGHT: 68 IN | TEMPERATURE: 98 F | DIASTOLIC BLOOD PRESSURE: 78 MMHG

## 2020-10-14 VITALS
OXYGEN SATURATION: 100 % | SYSTOLIC BLOOD PRESSURE: 150 MMHG | TEMPERATURE: 97 F | HEART RATE: 104 BPM | RESPIRATION RATE: 17 BRPM | DIASTOLIC BLOOD PRESSURE: 82 MMHG

## 2020-10-14 DIAGNOSIS — Z91.89: ICD-10-CM

## 2020-10-14 DIAGNOSIS — Z51.5 PALLIATIVE CARE ENCOUNTER: ICD-10-CM

## 2020-10-14 DIAGNOSIS — E78.5 DYSLIPIDEMIA: ICD-10-CM

## 2020-10-14 DIAGNOSIS — J98.8 CONGESTION OF RESPIRATORY TRACT: ICD-10-CM

## 2020-10-14 DIAGNOSIS — Z86.73 HISTORY OF CVA (CEREBROVASCULAR ACCIDENT): ICD-10-CM

## 2020-10-14 DIAGNOSIS — E11.9 TYPE 2 DIABETES MELLITUS WITHOUT COMPLICATION, WITHOUT LONG-TERM CURRENT USE OF INSULIN: ICD-10-CM

## 2020-10-14 DIAGNOSIS — G89.3 CANCER RELATED PAIN: ICD-10-CM

## 2020-10-14 DIAGNOSIS — I10 ESSENTIAL HYPERTENSION: ICD-10-CM

## 2020-10-14 DIAGNOSIS — C34.91 ADENOCARCINOMA, LUNG, RIGHT: Primary | ICD-10-CM

## 2020-10-14 DIAGNOSIS — G47.00 INSOMNIA, UNSPECIFIED TYPE: Primary | ICD-10-CM

## 2020-10-14 DIAGNOSIS — Z72.0 TOBACCO ABUSE: ICD-10-CM

## 2020-10-14 DIAGNOSIS — R05.8 ALLERGIC COUGH: ICD-10-CM

## 2020-10-14 DIAGNOSIS — T40.2X5A THERAPEUTIC OPIOID INDUCED CONSTIPATION: ICD-10-CM

## 2020-10-14 DIAGNOSIS — K59.03 THERAPEUTIC OPIOID INDUCED CONSTIPATION: ICD-10-CM

## 2020-10-14 DIAGNOSIS — E87.6 HYPOKALEMIA: ICD-10-CM

## 2020-10-14 PROCEDURE — 99215 OFFICE O/P EST HI 40 MIN: CPT | Mod: PBBFAC,25 | Performed by: INTERNAL MEDICINE

## 2020-10-14 PROCEDURE — 99214 OFFICE O/P EST MOD 30 MIN: CPT | Mod: S$PBB,,, | Performed by: INTERNAL MEDICINE

## 2020-10-14 PROCEDURE — 63600175 PHARM REV CODE 636 W HCPCS: Performed by: NURSE PRACTITIONER

## 2020-10-14 PROCEDURE — 2024F PR 7 FIELD PHOTOS WITH INTERP/ REVIEW: ICD-10-PCS | Mod: ,,, | Performed by: INTERNAL MEDICINE

## 2020-10-14 PROCEDURE — 99999 PR PBB SHADOW E&M-EST. PATIENT-LVL I: CPT | Mod: PBBFAC,,, | Performed by: NURSE PRACTITIONER

## 2020-10-14 PROCEDURE — 99214 PR OFFICE/OUTPT VISIT, EST, LEVL IV, 30-39 MIN: ICD-10-PCS | Mod: S$PBB,,, | Performed by: NURSE PRACTITIONER

## 2020-10-14 PROCEDURE — 99999 PR PBB SHADOW E&M-EST. PATIENT-LVL V: ICD-10-PCS | Mod: PBBFAC,,, | Performed by: INTERNAL MEDICINE

## 2020-10-14 PROCEDURE — 2024F 7 FLD RTA PHOTO EVC RTNOPTHY: CPT | Mod: ,,, | Performed by: INTERNAL MEDICINE

## 2020-10-14 PROCEDURE — A4216 STERILE WATER/SALINE, 10 ML: HCPCS | Performed by: INTERNAL MEDICINE

## 2020-10-14 PROCEDURE — 96413 CHEMO IV INFUSION 1 HR: CPT

## 2020-10-14 PROCEDURE — 99211 OFF/OP EST MAY X REQ PHY/QHP: CPT | Mod: PBBFAC,25,27 | Performed by: NURSE PRACTITIONER

## 2020-10-14 PROCEDURE — 96375 TX/PRO/DX INJ NEW DRUG ADDON: CPT

## 2020-10-14 PROCEDURE — 96415 CHEMO IV INFUSION ADDL HR: CPT

## 2020-10-14 PROCEDURE — 99999 PR PBB SHADOW E&M-EST. PATIENT-LVL V: CPT | Mod: PBBFAC,,, | Performed by: INTERNAL MEDICINE

## 2020-10-14 PROCEDURE — 96367 TX/PROPH/DG ADDL SEQ IV INF: CPT

## 2020-10-14 PROCEDURE — 25000003 PHARM REV CODE 250: Performed by: INTERNAL MEDICINE

## 2020-10-14 PROCEDURE — 63600175 PHARM REV CODE 636 W HCPCS: Mod: JG | Performed by: INTERNAL MEDICINE

## 2020-10-14 PROCEDURE — 99214 PR OFFICE/OUTPT VISIT, EST, LEVL IV, 30-39 MIN: ICD-10-PCS | Mod: S$PBB,,, | Performed by: INTERNAL MEDICINE

## 2020-10-14 PROCEDURE — 99214 OFFICE O/P EST MOD 30 MIN: CPT | Mod: S$PBB,,, | Performed by: NURSE PRACTITIONER

## 2020-10-14 PROCEDURE — 99999 PR PBB SHADOW E&M-EST. PATIENT-LVL I: ICD-10-PCS | Mod: PBBFAC,,, | Performed by: NURSE PRACTITIONER

## 2020-10-14 RX ORDER — HYDROMORPHONE HYDROCHLORIDE 2 MG/ML
2 INJECTION, SOLUTION INTRAMUSCULAR; INTRAVENOUS; SUBCUTANEOUS ONCE
Status: COMPLETED | OUTPATIENT
Start: 2020-10-14 | End: 2020-10-14

## 2020-10-14 RX ORDER — TRAZODONE HYDROCHLORIDE 50 MG/1
50 TABLET ORAL NIGHTLY
Qty: 15 TABLET | Refills: 3 | Status: SHIPPED | OUTPATIENT
Start: 2020-10-14 | End: 2021-01-06

## 2020-10-14 RX ORDER — AMOXICILLIN AND CLAVULANATE POTASSIUM 875; 125 MG/1; MG/1
TABLET, FILM COATED ORAL
Qty: 10 TABLET | Refills: 0 | Status: SHIPPED | OUTPATIENT
Start: 2020-10-14 | End: 2021-01-06 | Stop reason: ALTCHOICE

## 2020-10-14 RX ORDER — TALC
3 POWDER (GRAM) TOPICAL NIGHTLY
Refills: 0 | COMMUNITY
Start: 2020-10-14 | End: 2020-12-02 | Stop reason: SDUPTHER

## 2020-10-14 RX ORDER — SODIUM CHLORIDE 0.9 % (FLUSH) 0.9 %
10 SYRINGE (ML) INJECTION
Status: DISCONTINUED | OUTPATIENT
Start: 2020-10-14 | End: 2020-10-14 | Stop reason: HOSPADM

## 2020-10-14 RX ORDER — HEPARIN 100 UNIT/ML
500 SYRINGE INTRAVENOUS
Status: CANCELLED | OUTPATIENT
Start: 2020-10-14

## 2020-10-14 RX ORDER — SODIUM CHLORIDE 0.9 % (FLUSH) 0.9 %
10 SYRINGE (ML) INJECTION
Status: CANCELLED | OUTPATIENT
Start: 2020-10-14

## 2020-10-14 RX ORDER — HEPARIN 100 UNIT/ML
500 SYRINGE INTRAVENOUS
Status: DISCONTINUED | OUTPATIENT
Start: 2020-10-14 | End: 2020-10-14 | Stop reason: HOSPADM

## 2020-10-14 RX ADMIN — DEXAMETHASONE SODIUM PHOSPHATE 20 MG: 10 INJECTION, SOLUTION INTRAMUSCULAR; INTRAVENOUS at 01:10

## 2020-10-14 RX ADMIN — SODIUM CHLORIDE 736 MG: 0.9 INJECTION, SOLUTION INTRAVENOUS at 01:10

## 2020-10-14 RX ADMIN — HEPARIN 500 UNITS: 100 SYRINGE at 04:10

## 2020-10-14 RX ADMIN — DIPHENHYDRAMINE HYDROCHLORIDE 50 MG: 50 INJECTION INTRAMUSCULAR; INTRAVENOUS at 01:10

## 2020-10-14 RX ADMIN — HYDROMORPHONE HYDROCHLORIDE 2 MG: 2 INJECTION, SOLUTION INTRAMUSCULAR; INTRAVENOUS; SUBCUTANEOUS at 12:10

## 2020-10-14 RX ADMIN — DOCETAXEL 140 MG: 160 INJECTION, SOLUTION, CONCENTRATE INTRAVENOUS at 03:10

## 2020-10-14 RX ADMIN — Medication 10 ML: at 04:10

## 2020-10-14 NOTE — PROGRESS NOTES
Clinic Progress Note  Palliative Care        Reason for Consult: f/u symptom management            SUBJECTIVE:     History of Present Illness:  Luis Felipe Hawk is a 54 y.o. male  HTN, HLD, DMII, CAD who presents to follow up for symptoms related to NSCLC adenocarcinoma. He saw his oncologist today for f/u after on recent scan 9/10/20 showing an enlarged lymph node anterior to the stephanie measuring 1.0 cm in short axis compared to 1.5 cm on the prior study, a right upper lobe lung mass measuring 4.7 cm compared to 5.7 cm on the prior study, right chest wall mass centered at the destroyed right 3rd rib measures approximately 7.3 cm compared to 6.6 cm on the prior study. He continues to have pain in right shoulder ranging from 0-10 and rated 7/10 at present but feels its managed with his present pain medication regimen. He reports productive cough with yellow sputum x 2 weeks, insomnia and thinks the Wellbutrin he was on has increased his desire to smoke so he has discontinued it. He reports a good appetite, no N/V or constipation/diarrhea. He c/o having times where he worries about the cancer but doesn't consider himself to be deperssed or anxious. He will get chemo today and reports he feels much better with this cycle of chemo and denies s/e that cause him distress.      Past Medical History:  Past Medical History:   Diagnosis Date    CAD (coronary artery disease) 2013    one stent placed    DMII (diabetes mellitus, type 2)     Hypertension     Lung cancer     Mass of right lung     Mass of upper lobe of right lung 3/16/2020    3/13/2020 CT chest: Right upper lobe mass obliterating the anterior segmental bronchus. This is associated with right hilar and paramediastinal adenopathy.  This is concerning for bronchogenic carcinoma.  Sampling recommended.       Past Surgical History:  Past Surgical History:   Procedure Laterality Date    CV STENT      X 1    INSERTION OF TUNNELED CENTRAL VENOUS CATHETER (CVC) WITH  SUBCUTANEOUS PORT N/A 5/7/2020    Procedure: INSERTION, PORT-A-CATH;  Surgeon: Dosc Diagnostic Provider;  Location: [facility] OR;  Service: Radiology;  Laterality: N/A;  RN PREOP 5/7/20--rapid covid done    LUNG BIOPSY N/A 4/1/2020    Procedure: BIOPSY, LUNG;  Surgeon: Dosc Diagnostic Provider;  Location: [facility] OR;  Service: Radiology;  Laterality: N/A;  730AM STARTRN PHONE PREOP 3/31/2020    LUNG BIOPSY N/A 7/22/2020    Procedure: BIOPSY, LUNG;  Surgeon: Dos Diagnostic Provider;  Location: [facility] OR;  Service: Radiology;  Laterality: N/A;  9AM START  RN PREOP 7/21/2020--COVID NEGATIVE ON 7/21       Social History:  Social History     Socioeconomic History    Marital status: Single     Spouse name: Not on file    Number of children: Not on file    Years of education: Not on file    Highest education level: Not on file   Occupational History    Occupation:      Employer: [employer]   Social Needs    Financial resource strain: Not on file    Food insecurity     Worry: Not on file     Inability: Not on file    Transportation needs     Medical: Not on file     Non-medical: Not on file   Tobacco Use    Smoking status: Current Every Day Smoker     Packs/day: 2.00     Years: 30.00     Pack years: 60.00     Types: Cigarettes    Smokeless tobacco: Never Used   Substance and Sexual Activity    Alcohol use: Yes     Alcohol/week: 3.0 standard drinks     Types: 3 Shots of liquor per week     Frequency: 2-4 times a month    Drug use: No    Sexual activity: Yes     Partners: Female   Lifestyle    Physical activity     Days per week: Not on file     Minutes per session: Not on file    Stress: Not on file   Relationships    Social connections     Talks on phone: Not on file     Gets together: Not on file     Attends Congregation service: Not on file     Active member of club or organization: Not on file     Attends meetings of clubs or organizations: Not on file     Relationship status: Not on file   Other  Topics Concern    Not on file   Social History Narrative    Not on file       Family History:  Family History   Problem Relation Age of Onset    Peripheral vascular disease Mother     Heart disease Father     No Known Problems Brother     No Known Problems Brother     No Known Problems Son     No Known Problems Son     No Known Problems Daughter     No Known Problems Daughter     No Known Problems Daughter        Allergies and Medications (updated and reviewed):  Review of patient's allergies indicates:   Allergen Reactions    Tramadol Other (See Comments)     Chest burning  Chest burning     Medication List with Changes/Refills   Current Medications    ACETAMINOPHEN (TYLENOL) 325 MG TABLET    Take 325 mg by mouth every 6 (six) hours as needed for Pain.    ALBUTEROL (PROVENTIL/VENTOLIN HFA) 90 MCG/ACTUATION INHALER    INHALE 2 PUFFS BY MOUTH EVERY 6 HOURS AS NEEDED FOR WHEEZING    ASPIRIN (ECOTRIN) 81 MG EC TABLET    Take 1 tablet (81 mg total) by mouth once daily.    ATORVASTATIN (LIPITOR) 80 MG TABLET    Take 1 tablet (80 mg total) by mouth once daily.    BLOOD-GLUCOSE METER KIT    To check BG 1 times daily, to use with insurance preferred meter    BUPROPION (WELLBUTRIN SR) 150 MG TBSR 12 HR TABLET    Take 1 tablet (150 mg total) by mouth 2 (two) times daily.    CYPROHEPTADINE (PERIACTIN) 4 MG TABLET    Take 1 tablet (4 mg total) by mouth 4 (four) times daily.    DEXAMETHASONE (DECADRON) 4 MG TAB    Take 2 tablets (8 mg total) by mouth every 12 (twelve) hours. Take 2 tablets (8mg total) by mouth every 12 hours the day before chemo, the day of chemo and the day after chemo.    FOLIC ACID (FOLVITE) 1 MG TABLET    Take 1 tablet (1 mg total) by mouth once daily.    GABAPENTIN (NEURONTIN) 600 MG TABLET    Take 1 tablet (600 mg total) by mouth 3 (three) times daily.    HYDROCHLOROTHIAZIDE (HYDRODIURIL) 25 MG TABLET    TK 1 T PO ONE TIME PER DAY FOR BP    METFORMIN (GLUCOPHAGE) 500 MG TABLET    1 po qAM x 1  wk; then 1 po BID x 1 wk; then 2 AM/1 PM x 1 wk; then 2 BID maintenance. Take with food    METOPROLOL SUCCINATE (TOPROL-XL) 25 MG 24 HR TABLET    Take 1 tablet (25 mg total) by mouth once daily.    MORPHINE (MS CONTIN) 100 MG 12 HR TABLET    Take 1 tablet (100 mg total) by mouth every 8 (eight) hours.    ONDANSETRON (ZOFRAN) 4 MG TABLET    Take 1 tablet (4 mg total) by mouth every 6 (six) hours as needed for Nausea.    OXYCODONE-ACETAMINOPHEN (PERCOCET)  MG PER TABLET    Take 1 tablet by mouth every 4 (four) hours as needed for Pain.    POLYETHYLENE GLYCOL (MIRALAX) 17 GRAM PWPK    Take 17 g by mouth 2 (two) times daily as needed (Constipation).    POTASSIUM CHLORIDE SA (K-DUR,KLOR-CON) 20 MEQ TABLET    Take 2 tablets (40 mEq total) by mouth once daily.    SENNA-DOCUSATE 8.6-50 MG (PERICOLACE) 8.6-50 MG PER TABLET    Take 2 tablets by mouth once daily.               OBJECTIVE:   Symptom Assessment (ESAS 0-10 scale)     ESAS 0 1 2 3 4 5 6 7 8 9 10   Pain        x      Dyspnea x             Anxiety x             Nausea x             Depression  x             Anorexia x             Fatigue x             Insomnia        x      Restlessness  x             Agitation x                 Physical Exam   Constitutional: He is oriented to person, place, and time. He does not appear ill. No distress.   HENT:   Mouth/Throat: Mucous membranes are moist.   Cardiovascular: Normal rate, regular rhythm and normal heart sounds.   Pulmonary/Chest: Effort normal. No respiratory distress.   Abdominal: Soft. Bowel sounds are normal.   Musculoskeletal: Normal range of motion.      Right lower leg: No edema.      Left lower leg: No edema.   Neurological: He is alert and oriented to person, place, and time.   Skin: Skin is warm and dry.   Psychiatric: His behavior is normal. Mood normal.   Nursing note and vitals reviewed.        Plan/Recommendations:    -no changes to pain medication regimen  -productive cough worse lying down x 2  weeks: OTC Zyrtec once daily, continue Mucinex as directed. If it does not relieve may try Tessalon.Will start Augmentin 875/125 q 12 x 5 days, Its probably viral but patient high risk for infections  -insomnia- trazadone 50 mg q hs, Melatonin 3 mg q hs (patient worked  at night for years and reports trouble with sleep since)        > 50% of 45 min visit spent in chart review, face to face discussion of goals of care,  symptom assessment, coordination of care and emotional support.

## 2020-10-14 NOTE — Clinical Note
The patient can proceed with treatment today as long as his labs are OK.  The patient will need labs CBC, CMP, UA and TSH on 11/04/20 with clinic appt with me that day as well as treatment.

## 2020-10-14 NOTE — PLAN OF CARE
Patient arrived to unit for C3 Cyramza, Taxotere. Pt has c/o pain to Right shoulder/chest area. Nellie Herrera NP with pain management at chair side to assess. New Rx sent to [facility], and ordered  Dilaudid 2 mg  IVP now, pain scale reported 7/10.Plan of care reviewed, patient agreeable to plan. Port accessed and + blood return noted. Dilaudid 2 mg IVP given at 1248.1300 evaluated pain med response and pt rated pain at 4/10. Premeds of Benadryl and Decadron given. Lunch offered and pt ate 75%. Pt also slept well throughout infusions. Cyramza and Taxotere administered and Patient tolerated treatment well. No sign of reaction noted. VSS. Patient received discharge instructions and follow up appointments. Patient instructed to return Nov 4 for labs, Dr. Avendaño follow up, and chemo. Verbalized understanding and ambulated unassisted off unit accompanied by his spouse. Patient in NAD at time of discharge.

## 2020-10-26 DIAGNOSIS — Z51.5 PALLIATIVE CARE ENCOUNTER: ICD-10-CM

## 2020-10-26 DIAGNOSIS — C34.91 ADENOCARCINOMA, LUNG, RIGHT: ICD-10-CM

## 2020-10-26 DIAGNOSIS — R52 PAIN MANAGEMENT: ICD-10-CM

## 2020-10-26 RX ORDER — OXYCODONE AND ACETAMINOPHEN 10; 325 MG/1; MG/1
1 TABLET ORAL EVERY 4 HOURS PRN
Qty: 90 TABLET | Refills: 0 | Status: CANCELLED | OUTPATIENT
Start: 2020-10-26

## 2020-10-27 ENCOUNTER — PATIENT OUTREACH (OUTPATIENT)
Dept: ADMINISTRATIVE | Facility: HOSPITAL | Age: 55
End: 2020-10-27

## 2020-10-27 DIAGNOSIS — R52 PAIN MANAGEMENT: ICD-10-CM

## 2020-10-27 DIAGNOSIS — Z51.5 PALLIATIVE CARE ENCOUNTER: ICD-10-CM

## 2020-10-27 DIAGNOSIS — C34.91 ADENOCARCINOMA, LUNG, RIGHT: ICD-10-CM

## 2020-10-27 RX ORDER — OXYCODONE AND ACETAMINOPHEN 10; 325 MG/1; MG/1
1 TABLET ORAL EVERY 4 HOURS PRN
Qty: 90 TABLET | Refills: 0 | Status: SHIPPED | OUTPATIENT
Start: 2020-10-27 | End: 2020-11-23 | Stop reason: SDUPTHER

## 2020-11-03 NOTE — PROGRESS NOTES
PATIENT: Luis Felipe Hawk  MRN: [id number]  DATE: 11/4/2020      Diagnosis:   1. Adenocarcinoma, lung, right    2. Tobacco abuse    3. Cancer related pain    4. Therapeutic opioid induced constipation    5. Essential hypertension    6. Type 2 diabetes mellitus without complication, without long-term current use of insulin    7. History of CVA (cerebrovascular accident)    8. Dyslipidemia    9. Hypokalemia        Chief Complaint: Follow-up (NSCLC)    Oncologic History:      Oncologic History 11/21/19 CXR  3/13/20 CT Chest  4/01/20 IR biopsy  4/09/20 PET/CT  5/07/20 PORT placement  7/09/20 CT Chest   7/22/20 Chest mass Biopsy  7/24/20 PET/CT  9/10/20 CT C/A/P    Oncologic Treatment 5/13/20 B 12 injection  5/20/20 Carboplatin/Alimta/Pembrolizumab cycle 3  9/02/20 Docetaxel/Ramucirumab - present s/p 3 cycles    Pathology 4/01/20 - adenocarcinoma suggestive of a possible gastrointestinal origin - PD-L1, ROS-1 and ALK negative  7/22/20 - moderately differentiated adenocarcinoma      Oncologic History:   The patient was initially seen by Dr Carmona on 11/21/19 for a cough and underwent a CXR showing a soft tissue mass in the anterior segment of the right upper lobe that measures at least 4 cm in cranial-caudad extent.  Per chart review an attempt to contact MR Hawk was mad on multiple occasions in order to have a CT of the chest done.  CT of the chest was eventually done on 3/13/20 and showed enlarged right paratracheal LN measuring 2.8 cm, slightly enlarged right hilar nodes, a mass within the anterior segment of the right upper lobe measuring 3.9 x 4.7 cm abutting the right suprahilar region and obliterating the anterior segmental bronchus.  The patient underwent IR guided biopsy of the right lung on 4/01/20 showing adenocarcinoma suggestive of a possible gastrointestinal origin.  MRI of the brain on 4/09/20 showed no evidence of intracranial metastatic disease, remote small infarcts within the right superior frontal  lobe and remote lacunar-type infarcts of the bilateral lentiform nuclei and right thalamus.  PET/CT on 4/09/20 showed a 6.5 x 3.7 cm pulmonary mass in the anterior segment of the right upper lobe abutting the mediastinal pleura, large right pretracheal soft tissue mass measuring 4.4 x 3.8 cm, left prevascular lymph node measuring 1.0 cm, few scattered bilateral poorly defined subcentimeter pulmonary nodules, and a right chest wall soft tissue lesion partially eroding the lateral right 3rd rib measuring 2 x 2 cm.  The patient had a PORT placed in the right chest on 5/07/20. He received his B12 injection on 5/13/20.  He also underwent US of the carotid on 5/14/20 showing 0-19% internal carotid artery stenosis on the right with a lymph node in the proximal neck lateral to the common carotid artery measuring 2.8 x 1.7 x 3.6 cm.  On the left there is 0-19% stenosis.  His PORT was placed on 5/07/20.  The patient saw Dr Bennett with vascular neurology on 5/21/20 for CVA seen on MRI of the brain 4/09/20.  Recommendations were made for ASA, statin and BP control to prevent secondary stroke.  The patient was then see in the ER on 5/22/20 with concern for PNA and treated with Levaquin.  He then saw Nellie Herrera with palliative care on 6/03/20 for symptoms management.  MS Contin 60 mg q 8 hours, Percocet 5/325 1 tablet q 4 hours prn for breakthrough pain/cough.   The patient underwent a CT of the chest on 7/09/20 showing a right paratracheal lymph node measuring 1.5 cm in short axis, previously 2.4 cm, a right upper lobe mass measuring 5.7 cm which is unchanged, patchy ground-glass opacities bilaterally, a 1.2 cm soft tissue density focus along the right posterior aspect of the trachea at the level of C7-T1, and a right chest wall mass centered at the destroyed 3rd rib measuring 6.6 cm, previously 4.3 cm.   The patient underwent a right chest wall mass biopsy on 7/22/20.  He underwent a PET/CT on 7/24/20 showing a right  upper lobe paramediastinal mass measuring 5.8 x 3.9 cm previously 6.6 x 4.1; resolution in radiotracer uptake in lymph nodes within the right supraclavicular, right paratracheal, and prevascular stations; increased size of a hypermetabolic soft tissue mass centered within the right lateral 3rd rib measuring 6.5 x 5.7 cm previously 2.9 x 2.1 cm.  The patient underwent restaging scans on 9/10/20 showing an enlarged lymph node anterior to the stephanie measuring 1.0 cm in short axis compared to 1.5 cm on the prior study, a right upper lobe lung mass measuring 4.7 cm compared to 5.7 cm on the prior study, right chest wall mass centered at the destroyed right 3rd rib measures approximately 7.3 cm compared to 6.6 cm on the prior study.  Subjective:    Interval History: Mr. Hawk is a 54 y.o. male with HTN, HLD, DMII, CAD who presents to follow up for NSCLC adenocarcinoma.  The patient states he continues to have pain in his right chest 5/10 currently.  Pain is controled with pain meds.  He states he has occasional leg cramps in his calves.  He denies LE swelling.  He endorses a chronic cough worse with lying down.  The patient denies abdominal pain, N/V, constipation, diarrhea.  The patient denies fever, chills, night sweats, weight loss, new lumps or bumps, easy bruising or bleeding.    Past Medical History:   Past Medical History:   Diagnosis Date    CAD (coronary artery disease) 2013    one stent placed    DMII (diabetes mellitus, type 2)     Hypertension     Lung cancer     Mass of right lung     Mass of upper lobe of right lung 3/16/2020    3/13/2020 CT chest: Right upper lobe mass obliterating the anterior segmental bronchus. This is associated with right hilar and paramediastinal adenopathy.  This is concerning for bronchogenic carcinoma.  Sampling recommended.       Past Surgical HIstory:   Past Surgical History:   Procedure Laterality Date    CV STENT      X 1    INSERTION OF TUNNELED CENTRAL VENOUS  CATHETER (CVC) WITH SUBCUTANEOUS PORT N/A 5/7/2020    Procedure: INSERTION, PORT-A-CATH;  Surgeon: [facility] Diagnostic Provider;  Location: [facility] OR;  Service: Radiology;  Laterality: N/A;  RN PREOP 5/7/20--rapid covid done    LUNG BIOPSY N/A 4/1/2020    Procedure: BIOPSY, LUNG;  Surgeon: [facility] Diagnostic Provider;  Location: [facility] OR;  Service: Radiology;  Laterality: N/A;  730AM STARTRN PHONE PREOP 3/31/2020    LUNG BIOPSY N/A 7/22/2020    Procedure: BIOPSY, LUNG;  Surgeon: [facility] Diagnostic Provider;  Location: [facility] OR;  Service: Radiology;  Laterality: N/A;  9AM START  RN PREOP 7/21/2020--COVID NEGATIVE ON 7/21       Family History:   Family History   Problem Relation Age of Onset    Peripheral vascular disease Mother     Heart disease Father     No Known Problems Brother     No Known Problems Brother     No Known Problems Son     No Known Problems Son     No Known Problems Daughter     No Known Problems Daughter     No Known Problems Daughter        Social History:  reports that he has been smoking cigarettes. He has a 60.00 pack-year smoking history. He has never used smokeless tobacco. He reports current alcohol use of about 3.0 standard drinks of alcohol per week. He reports that he does not use drugs.    Allergies:  Review of patient's allergies indicates:   Allergen Reactions    Tramadol Other (See Comments)     Chest burning  Chest burning       Medications:  Current Outpatient Medications   Medication Sig Dispense Refill    acetaminophen (TYLENOL) 325 MG tablet Take 325 mg by mouth every 6 (six) hours as needed for Pain.      albuterol (PROVENTIL/VENTOLIN HFA) 90 mcg/actuation inhaler INHALE 2 PUFFS BY MOUTH EVERY 6 HOURS AS NEEDED FOR WHEEZING 18 g 11    amoxicillin-clavulanate 875-125mg (AUGMENTIN) 875-125 mg per tablet Take one tablet by mouth every 12 hours for 5 days 10 tablet 0    aspirin (ECOTRIN) 81 MG EC tablet Take 1 tablet (81 mg total) by mouth once daily. 30 tablet 11    atorvastatin  (LIPITOR) 80 MG tablet Take 1 tablet (80 mg total) by mouth once daily. 90 tablet 3    blood-glucose meter kit To check BG 1 times daily, to use with insurance preferred meter 1 each 0    buPROPion (WELLBUTRIN SR) 150 MG TBSR 12 hr tablet Take 1 tablet (150 mg total) by mouth 2 (two) times daily. 60 tablet 0    cyproheptadine (PERIACTIN) 4 mg tablet Take 1 tablet (4 mg total) by mouth 4 (four) times daily. 120 tablet 0    dexAMETHasone (DECADRON) 4 MG Tab Take 2 tablets (8 mg total) by mouth every 12 (twelve) hours. Take 2 tablets (8mg total) by mouth every 12 hours the day before chemo, the day of chemo and the day after chemo. 120 tablet 0    folic acid (FOLVITE) 1 MG tablet Take 1 tablet (1 mg total) by mouth once daily. 30 tablet 6    gabapentin (NEURONTIN) 600 MG tablet Take 1 tablet (600 mg total) by mouth 3 (three) times daily. 30 tablet 2    hydroCHLOROthiazide (HYDRODIURIL) 25 MG tablet TK 1 T PO ONE TIME PER DAY FOR BP 90 tablet 3    melatonin (MELATIN) 3 mg tablet Take 1 tablet (3 mg total) by mouth nightly.  0    metFORMIN (GLUCOPHAGE) 500 MG tablet 1 po qAM x 1 wk; then 1 po BID x 1 wk; then 2 AM/1 PM x 1 wk; then 2 BID maintenance. Take with food 120 tablet 5    metoprolol succinate (TOPROL-XL) 25 MG 24 hr tablet Take 1 tablet (25 mg total) by mouth once daily. 90 tablet 3    morphine (MS CONTIN) 100 MG 12 hr tablet Take 1 tablet (100 mg total) by mouth every 8 (eight) hours. 90 tablet 0    ondansetron (ZOFRAN) 4 MG tablet Take 1 tablet (4 mg total) by mouth every 6 (six) hours as needed for Nausea. 90 tablet 6    oxyCODONE-acetaminophen (PERCOCET)  mg per tablet Take 1 tablet by mouth every 4 (four) hours as needed for Pain. 90 tablet 0    polyethylene glycol (MIRALAX) 17 gram PwPk Take 17 g by mouth 2 (two) times daily as needed (Constipation). 30 each 6    potassium chloride SA (K-DUR,KLOR-CON) 20 MEQ tablet Take 2 tablets (40 mEq total) by mouth once daily. 30 tablet 11     "senna-docusate 8.6-50 mg (PERICOLACE) 8.6-50 mg per tablet Take 2 tablets by mouth once daily. 60 tablet 6    traZODone (DESYREL) 50 MG tablet Take 1 tablet (50 mg total) by mouth every evening. 15 tablet 3     No current facility-administered medications for this visit.        Review of Systems   Constitutional: Negative for appetite change, chills, diaphoresis, fatigue, fever and unexpected weight change.   Respiratory: Positive for cough. Negative for shortness of breath.    Cardiovascular: Negative for chest pain and palpitations.   Gastrointestinal: Negative for abdominal pain, constipation, diarrhea, nausea and vomiting.   Musculoskeletal:        Pain in his lateral right chest   Skin: Negative for color change and rash.   Neurological: Negative for headaches.   Hematological: Negative for adenopathy. Does not bruise/bleed easily.       ECOG Performance Status: 1    Objective:      Vitals:   Vitals:    11/04/20 1317   BP: 132/85   BP Location: Left arm   Patient Position: Sitting   BP Method: Medium (Automatic)   Pulse: 97   Temp: 98.2 °F (36.8 °C)   TempSrc: Oral   SpO2: 98%   Weight: 73.5 kg (162 lb 0.6 oz)   Height: 5' 8" (1.727 m)       Physical Exam  Constitutional:       General: He is not in acute distress.     Appearance: He is well-developed. He is not diaphoretic.   HENT:      Head: Normocephalic and atraumatic.   Neck:      Musculoskeletal: Normal range of motion.   Cardiovascular:      Rate and Rhythm: Normal rate and regular rhythm.      Heart sounds: Normal heart sounds. No murmur. No friction rub. No gallop.    Pulmonary:      Effort: Pulmonary effort is normal. No respiratory distress.      Breath sounds: Normal breath sounds. No wheezing or rales.   Chest:      Chest wall: No tenderness.   Abdominal:      General: Bowel sounds are normal. There is no distension.      Palpations: Abdomen is soft. There is no mass.      Tenderness: There is no abdominal tenderness. There is no rebound. "   Musculoskeletal:      Comments: PORT in right chest   Lymphadenopathy:      Cervical: No cervical adenopathy.      Upper Body:      Right upper body: No supraclavicular adenopathy.      Left upper body: No supraclavicular adenopathy.   Skin:     General: Skin is warm and dry.      Findings: No erythema or rash.   Neurological:      Mental Status: He is alert and oriented to person, place, and time.   Psychiatric:         Behavior: Behavior normal.         Laboratory Data:  Lab Visit on 11/04/2020   Component Date Value Ref Range Status    Specimen UA 11/04/2020 Urine, Clean Catch   Final    Color, UA 11/04/2020 Yellow  Yellow, Straw, Julieta Final    Appearance, UA 11/04/2020 Clear  Clear Final    pH, UA 11/04/2020 6.0  5.0 - 8.0 Final    Specific Dickerson, UA 11/04/2020 1.020  1.005 - 1.030 Final    Protein, UA 11/04/2020 Negative  Negative Final    Comment: Recommend a 24 hour urine protein or a urine   protein/creatinine ratio if globulin induced proteinuria is  clinically suspected.      Glucose, UA 11/04/2020 Negative  Negative Final    Ketones, UA 11/04/2020 Negative  Negative Final    Bilirubin (UA) 11/04/2020 Negative  Negative Final    Occult Blood UA 11/04/2020 Negative  Negative Final    Nitrite, UA 11/04/2020 Negative  Negative Final    Urobilinogen, UA 11/04/2020 2.0-3.0* <2.0 EU/dL Final    Leukocytes, UA 11/04/2020 Negative  Negative Final   Lab Visit on 11/04/2020   Component Date Value Ref Range Status    WBC 11/04/2020 7.93  3.90 - 12.70 K/uL Final    RBC 11/04/2020 4.43* 4.60 - 6.20 M/uL Final    Hemoglobin 11/04/2020 12.2* 14.0 - 18.0 g/dL Final    Hematocrit 11/04/2020 37.6* 40.0 - 54.0 % Final    MCV 11/04/2020 85  82 - 98 fL Final    MCH 11/04/2020 27.5  27.0 - 31.0 pg Final    MCHC 11/04/2020 32.4  32.0 - 36.0 g/dL Final    RDW 11/04/2020 17.0* 11.5 - 14.5 % Final    Platelets 11/04/2020 408* 150 - 350 K/uL Final    MPV 11/04/2020 9.4  9.2 - 12.9 fL Final    Immature  Granulocytes 11/04/2020 0.3  0.0 - 0.5 % Final    Gran # (ANC) 11/04/2020 5.7  1.8 - 7.7 K/uL Final    Immature Grans (Abs) 11/04/2020 0.02  0.00 - 0.04 K/uL Final    Comment: Mild elevation in immature granulocytes is non specific and   can be seen in a variety of conditions including stress response,   acute inflammation, trauma and pregnancy. Correlation with other   laboratory and clinical findings is essential.      Lymph # 11/04/2020 1.6  1.0 - 4.8 K/uL Final    Mono # 11/04/2020 0.5  0.3 - 1.0 K/uL Final    Eos # 11/04/2020 0.1  0.0 - 0.5 K/uL Final    Baso # 11/04/2020 0.01  0.00 - 0.20 K/uL Final    nRBC 11/04/2020 0  0 /100 WBC Final    Gran % 11/04/2020 71.7  38.0 - 73.0 % Final    Lymph % 11/04/2020 20.1  18.0 - 48.0 % Final    Mono % 11/04/2020 6.3  4.0 - 15.0 % Final    Eosinophil % 11/04/2020 1.5  0.0 - 8.0 % Final    Basophil % 11/04/2020 0.1  0.0 - 1.9 % Final    Differential Method 11/04/2020 Automated   Final         Imaging: CT Chest 9/10/20     There is a right internal jugular Port-A-Cath present with the tip of the catheter within the superior vena cava near the cavoatrial junction.  The structures at the base of the neck appear grossly unremarkable.  The heart is not enlarged.  The thoracic aorta is normal in caliber and there is no evidence for thoracic aortic aneurysm or aortic dissection.  There is a borderline enlarged lymph node anterior to the stephanie measuring 1.0 cm in short axis.  (Image 39, series 2) compared to 1.5 cm on the prior study.  There is a right upper lobe lung mass present measuring 4.7 cm (image 48, series 2) compared to 5.7 cm on the prior study.  There is some adjacent ground-glass consolidation present as well as mild atelectasis.  No new lung nodules are identified.  The previously identified soft tissue density focus along the posterior aspect of the trachea is no longer evident and may have represented adherent secretions.  There is no evidence for  pneumothorax or pleural effusions.  The right chest wall mass centered at the destroyed right 3rd rib measures approximately 7.3 cm (image 153, series 4) compared to 6.6 cm on the prior study.  No new bone lesions are identified.     The liver is normal in size and is homogeneous in density with no focal liver lesions identified.  The gallbladder is present and appears grossly unremarkable.  There is no evidence for intrahepatic or extrahepatic biliary dilatation.  The spleen, stomach, and pancreas appear unremarkable.  The adrenal glands are not enlarged.  The kidneys are normal in size, position, and contour and there is no evidence for abnormal renal masses or hydronephrosis.  The kidneys are noted to concentrate contrast symmetrically.  The abdominal aorta demonstrates mild ectasia without aneurysmal dilatation.  No para-aortic lymphadenopathy is identified.  No dilated loops of bowel are evident.  There is a trace amount of ascites.  The urinary bladder appears unremarkable.  There is no evidence for pelvic or inguinal lymphadenopathy.        Assessment:       1. Adenocarcinoma, lung, right    2. Tobacco abuse    3. Cancer related pain    4. Therapeutic opioid induced constipation    5. Essential hypertension    6. Type 2 diabetes mellitus without complication, without long-term current use of insulin    7. History of CVA (cerebrovascular accident)    8. Dyslipidemia    9. Hypokalemia           Plan:     NSCLC - biopsy of the right lung on 4/01/20 showed adenocarcinoma  -PET/CT on 4/09/20 showed 6.5 x 3.7 cm pulmonary mass in the anterior segment of the right upper lobe abutting the mediastinal pleura, large right pretracheal soft tissue mass measuring 4.4 x 3.8 cm, left prevascular lymph node measuring 1.0 cm, few scattered bilateral poorly defined subcentimeter pulmonary nodules, and a right chest wall soft tissue lesion partially eroding the lateral right 3rd rib measuring 2 x 2 cm  -The patient received 3  cycles of Carboplatin, Pemetrexed and Pembrolizumab   -CT of the chest on 7/09/20 showed progression of the right chest wall mass now measuring 6.6cm  -Pt discussed at thoracic tumor board with no intra pulmonary intervention which could improve the patient's respiratory status  -The patient completed treatment with radiation at [facility] completed 9/01/20 with total of 3000 cGy.   -STRATA trial showed a PTEN mutation indicating possibility of entering into a trial though the [employer]MATCH  -Will proceed currently with treatment with cycle 4 of Docetaxel and Ramucirumab  -Pt to take dex 8mg BID the day before chemo, day of chemo and day after chemo.    Tobacco Abuse - Pt is on Bupropion  -Prior referral to smoking cessation    Cancer Related pain - Pt managed by Mrs. Herrera in palliative care.  -MS Contin 100mg every 8 hours  -Pt also taking percocet 10-325mg every 4 hours.   -Pt also on gabapentin   -Palliative care managing    Opioid Induced Constipation - Constipation improved with miralax  -Will monitor    HTN - pt on HCTZ and metoprolol  -Will monitor    HLD - pt on lipitor  -PCP managing    DMII - pt's A1C is 10.1 on 3/13/20  -Pt on metformin  -Will monitor    CAD -  Pt on statin, ASA, metoprolol    CVA - MRI of the brain on 4/09/20 showed old infarcts in the right superior frontal lobe and remote lacunar-type infarcts of the bilateral lentiform nuclei and right thalamus  -Pt on ASA and statin  -Pt saw Dr Bennett on 5/21/20 who recommended continued ASA, statin and BP control    Hypokaliemia - may be from HCTZ  -Potassium 4.5 on 10/14/20  -Will continue 40mEq daily of potassium    Advance Care Planning     Power of   I initiated the process of advance care planning today and explained the importance of this process to the patient.  I introduced the concept of advance directives to the patient, as well. Then the patient received detailed information about the importance of designating a Health Care Power of   (HCPOA). He was also instructed to communicate with this person about their wishes for future healthcare, should he become sick and lose decision-making capacity. The patient has not previously appointed a HCPOA. After our discussion, the patient has decided to complete a HCPOA and will bring the form completed to his next clinic appointment.                -Follow Up - clinic visit with me on 11/27/20 with CBC, CMP and urinalysis prior to the appt and chemo that day    Richar Avendaño MD  Hematology and Oncology  [facility]  Office:[phone]  Fax: [phone]

## 2020-11-04 ENCOUNTER — INFUSION (OUTPATIENT)
Dept: INFUSION THERAPY | Facility: HOSPITAL | Age: 55
End: 2020-11-04
Attending: INTERNAL MEDICINE
Payer: MEDICAID

## 2020-11-04 ENCOUNTER — OFFICE VISIT (OUTPATIENT)
Dept: HEMATOLOGY/ONCOLOGY | Facility: CLINIC | Age: 55
End: 2020-11-04
Payer: MEDICAID

## 2020-11-04 VITALS
RESPIRATION RATE: 18 BRPM | SYSTOLIC BLOOD PRESSURE: 138 MMHG | OXYGEN SATURATION: 99 % | DIASTOLIC BLOOD PRESSURE: 79 MMHG | TEMPERATURE: 98 F | HEART RATE: 92 BPM

## 2020-11-04 VITALS
BODY MASS INDEX: 24.56 KG/M2 | DIASTOLIC BLOOD PRESSURE: 85 MMHG | TEMPERATURE: 98 F | WEIGHT: 162.06 LBS | HEIGHT: 68 IN | OXYGEN SATURATION: 98 % | HEART RATE: 97 BPM | SYSTOLIC BLOOD PRESSURE: 132 MMHG

## 2020-11-04 DIAGNOSIS — E87.6 HYPOKALEMIA: ICD-10-CM

## 2020-11-04 DIAGNOSIS — I10 ESSENTIAL HYPERTENSION: ICD-10-CM

## 2020-11-04 DIAGNOSIS — Z72.0 TOBACCO ABUSE: ICD-10-CM

## 2020-11-04 DIAGNOSIS — Z86.73 HISTORY OF CVA (CEREBROVASCULAR ACCIDENT): ICD-10-CM

## 2020-11-04 DIAGNOSIS — C34.91 ADENOCARCINOMA, LUNG, RIGHT: Primary | ICD-10-CM

## 2020-11-04 DIAGNOSIS — G89.3 CANCER RELATED PAIN: ICD-10-CM

## 2020-11-04 DIAGNOSIS — T40.2X5A THERAPEUTIC OPIOID INDUCED CONSTIPATION: ICD-10-CM

## 2020-11-04 DIAGNOSIS — K59.03 THERAPEUTIC OPIOID INDUCED CONSTIPATION: ICD-10-CM

## 2020-11-04 DIAGNOSIS — E78.5 DYSLIPIDEMIA: ICD-10-CM

## 2020-11-04 DIAGNOSIS — E11.9 TYPE 2 DIABETES MELLITUS WITHOUT COMPLICATION, WITHOUT LONG-TERM CURRENT USE OF INSULIN: ICD-10-CM

## 2020-11-04 PROCEDURE — 99215 OFFICE O/P EST HI 40 MIN: CPT | Mod: PBBFAC | Performed by: INTERNAL MEDICINE

## 2020-11-04 PROCEDURE — 99214 PR OFFICE/OUTPT VISIT, EST, LEVL IV, 30-39 MIN: ICD-10-PCS | Mod: S$PBB,,, | Performed by: INTERNAL MEDICINE

## 2020-11-04 PROCEDURE — 2024F PR 7 FIELD PHOTOS WITH INTERP/ REVIEW: ICD-10-PCS | Mod: ,,, | Performed by: INTERNAL MEDICINE

## 2020-11-04 PROCEDURE — 2024F 7 FLD RTA PHOTO EVC RTNOPTHY: CPT | Mod: ,,, | Performed by: INTERNAL MEDICINE

## 2020-11-04 PROCEDURE — 99999 PR PBB SHADOW E&M-EST. PATIENT-LVL V: CPT | Mod: PBBFAC,,, | Performed by: INTERNAL MEDICINE

## 2020-11-04 PROCEDURE — 99999 PR PBB SHADOW E&M-EST. PATIENT-LVL V: ICD-10-PCS | Mod: PBBFAC,,, | Performed by: INTERNAL MEDICINE

## 2020-11-04 PROCEDURE — 99214 OFFICE O/P EST MOD 30 MIN: CPT | Mod: S$PBB,,, | Performed by: INTERNAL MEDICINE

## 2020-11-04 RX ORDER — SODIUM CHLORIDE 0.9 % (FLUSH) 0.9 %
10 SYRINGE (ML) INJECTION
Status: CANCELLED | OUTPATIENT
Start: 2020-11-04

## 2020-11-04 RX ORDER — HEPARIN 100 UNIT/ML
500 SYRINGE INTRAVENOUS
Status: CANCELLED | OUTPATIENT
Start: 2020-11-04

## 2020-11-04 RX ORDER — HEPARIN 100 UNIT/ML
500 SYRINGE INTRAVENOUS
Status: DISCONTINUED | OUTPATIENT
Start: 2020-11-04 | End: 2020-11-04 | Stop reason: HOSPADM

## 2020-11-04 RX ORDER — SODIUM CHLORIDE 0.9 % (FLUSH) 0.9 %
10 SYRINGE (ML) INJECTION
Status: DISCONTINUED | OUTPATIENT
Start: 2020-11-04 | End: 2020-11-04 | Stop reason: HOSPADM

## 2020-11-04 NOTE — Clinical Note
The patient can proceed with chemo today as long as his labs are good.  The patient needs chemo changed from 11/25/20 to 11/27/20.  He will need a clinic visit with me on 11/27/20 with CBC, CMP and urinalysis prior to the appt.

## 2020-11-04 NOTE — PLAN OF CARE
Pt arrived to unit from Dr. Avendaño appt. Here for chemo. Pt informed it will be 3 hours for treatment. Pt stated he did not think he would be late and wishes to reschedule to tomorrow. Dr. Avendaño notified. Pt will return tomorrow at 10a for chemo. Pt discharged from unit. No distress noted.

## 2020-11-05 ENCOUNTER — INFUSION (OUTPATIENT)
Dept: INFUSION THERAPY | Facility: HOSPITAL | Age: 55
End: 2020-11-05
Attending: INTERNAL MEDICINE
Payer: MEDICAID

## 2020-11-05 ENCOUNTER — TELEPHONE (OUTPATIENT)
Dept: ENDOCRINOLOGY | Facility: CLINIC | Age: 55
End: 2020-11-05

## 2020-11-05 VITALS
OXYGEN SATURATION: 98 % | RESPIRATION RATE: 18 BRPM | HEART RATE: 68 BPM | TEMPERATURE: 98 F | DIASTOLIC BLOOD PRESSURE: 84 MMHG | SYSTOLIC BLOOD PRESSURE: 156 MMHG

## 2020-11-05 DIAGNOSIS — G89.3 CANCER RELATED PAIN: ICD-10-CM

## 2020-11-05 DIAGNOSIS — C34.91 ADENOCARCINOMA, LUNG, RIGHT: Primary | ICD-10-CM

## 2020-11-05 PROCEDURE — 63600175 PHARM REV CODE 636 W HCPCS: Performed by: INTERNAL MEDICINE

## 2020-11-05 PROCEDURE — A4216 STERILE WATER/SALINE, 10 ML: HCPCS | Performed by: INTERNAL MEDICINE

## 2020-11-05 PROCEDURE — 96415 CHEMO IV INFUSION ADDL HR: CPT

## 2020-11-05 PROCEDURE — 25000003 PHARM REV CODE 250: Performed by: INTERNAL MEDICINE

## 2020-11-05 PROCEDURE — 96413 CHEMO IV INFUSION 1 HR: CPT

## 2020-11-05 PROCEDURE — 96367 TX/PROPH/DG ADDL SEQ IV INF: CPT

## 2020-11-05 RX ORDER — HEPARIN 100 UNIT/ML
500 SYRINGE INTRAVENOUS
Status: DISCONTINUED | OUTPATIENT
Start: 2020-11-05 | End: 2020-11-05 | Stop reason: HOSPADM

## 2020-11-05 RX ORDER — SODIUM CHLORIDE 0.9 % (FLUSH) 0.9 %
10 SYRINGE (ML) INJECTION
Status: DISCONTINUED | OUTPATIENT
Start: 2020-11-05 | End: 2020-11-05 | Stop reason: HOSPADM

## 2020-11-05 RX ORDER — MORPHINE SULFATE 100 MG/1
100 TABLET, FILM COATED, EXTENDED RELEASE ORAL EVERY 8 HOURS
Qty: 90 TABLET | Refills: 0 | Status: SHIPPED | OUTPATIENT
Start: 2020-11-05 | End: 2020-12-02

## 2020-11-05 RX ADMIN — DIPHENHYDRAMINE HYDROCHLORIDE 50 MG: 50 INJECTION INTRAMUSCULAR; INTRAVENOUS at 10:11

## 2020-11-05 RX ADMIN — DOCETAXEL 140 MG: 20 INJECTION, SOLUTION, CONCENTRATE INTRAVENOUS at 12:11

## 2020-11-05 RX ADMIN — DEXAMETHASONE SODIUM PHOSPHATE 20 MG: 10 INJECTION, SOLUTION INTRAMUSCULAR; INTRAVENOUS at 11:11

## 2020-11-05 RX ADMIN — HEPARIN 500 UNITS: 100 SYRINGE at 02:11

## 2020-11-05 RX ADMIN — SODIUM CHLORIDE 735 MG: 0.9 INJECTION, SOLUTION INTRAVENOUS at 11:11

## 2020-11-05 RX ADMIN — Medication 10 ML: at 02:11

## 2020-11-05 NOTE — TELEPHONE ENCOUNTER
----- Message from Sabine Bowden NP sent at 11/5/2020  8:35 AM CST -----  a1c is down to 6.2% which indicates controlled diabetes on metformin. Please follow up with Dr. Carmona for chronic diabetes care and also for cholesterol management. He does not have to return to Endocrine at this time. Return back as needed.

## 2020-11-05 NOTE — TELEPHONE ENCOUNTER
Attempted to call pt to notify him of A1C result and schedule to rtc with Sabine Bowden NP or PCP El Carmona MD. unable to LVM due to line being busy

## 2020-11-05 NOTE — TELEPHONE ENCOUNTER
Called pt to inform him of his A1C result, indicating proper diabetes control on metformin. Pt understood. Scheduled pt to follow up with PCP El Carmona for Chronic diabetes/cholesterol management on 1/11/2021 at [facility] location.

## 2020-11-06 ENCOUNTER — DOCUMENTATION ONLY (OUTPATIENT)
Dept: HEMATOLOGY/ONCOLOGY | Facility: CLINIC | Age: 55
End: 2020-11-06

## 2020-11-14 ENCOUNTER — NURSE TRIAGE (OUTPATIENT)
Dept: ADMINISTRATIVE | Facility: CLINIC | Age: 55
End: 2020-11-14

## 2020-11-14 NOTE — TELEPHONE ENCOUNTER
"Speaking to Annabelle on behalf of pt    Numbness in right arm/hand present upon waking up this morning. Numbness now resolved after elevating arm and opening hand. Pt thinks he slept on arm last night. Denies worsening pain, pt states "always" in pain. Pt states numbness has now resolved. No other neuro deficits. Advised per protocol. VU.     Reason for Disposition   [1] Tingling in hand (e.g., pins and needles) AND [2] after prolonged laying on arm AND [3]  brief (now gone)    Additional Information   Negative: [1] SEVERE weakness (i.e., unable to walk or barely able to walk, requires support) AND [2] new onset or worsening   Negative: [1] Weakness (i.e., paralysis, loss of muscle strength) of the face, arm / hand, or leg / foot on one side of the body AND [2] sudden onset AND [3] present now   Negative: [1] Numbness (i.e., loss of sensation) of the face, arm / hand, or leg / foot on one side of the body AND [2] sudden onset AND [3] present now   Negative: [1] Loss of speech or garbled speech AND [2] sudden onset AND [3] present now   Negative: Difficult to awaken or acting confused (e.g., disoriented, slurred speech)   Negative: Sounds like a life-threatening emergency to the triager   Negative: Headache  (and neurologic deficit)   Negative: [1] Back pain AND [2] numbness (loss of sensation) in groin or rectal area   Negative: [1] Unable to urinate (or only a few drops) > 4 hours AND [2] bladder feels very full (e.g., palpable bladder or strong urge to urinate)   Negative: [1] Loss of bladder or bowel control (urine or bowel incontinence; wetting self, leaking stool) AND [2] new onset   Negative: [1] Weakness (i.e., paralysis, loss of muscle strength) of the face, arm / hand, or leg / foot on one side of the body AND [2] sudden onset AND [3] brief (now gone)   Negative: [1] Numbness (i.e., loss of sensation) of the face, arm / hand, or leg / foot on one side of the body AND [2] sudden onset AND [3] brief " (now gone)   Negative: [1] Loss of speech or garbled speech AND [2] sudden onset AND [3] brief (now gone)   Negative: Bell's palsy suspected (i.e., weakness on only one side of the face, developing over hours to days, no other symptoms)   Negative: Patient sounds very sick or weak to the triager   Negative: [1] Loss of speech or garbled speech AND [2] is a chronic symptom (recurrent or ongoing AND present > 4 weeks)   Negative: [1] Weakness of arm / hand, or leg / foot AND [2] is a chronic symptom (recurrent or ongoing AND present > 4 weeks)   Negative: [1] Numbness or tingling in one or both hands AND [2] is a chronic symptom (recurrent or ongoing AND present > 4 weeks)   Negative: [1] Numbness or tingling in one or both feet AND [2] is a chronic symptom (recurrent or ongoing AND present > 4 weeks)   Negative: [1] Numbness or tingling on both sides of body AND [2] is a new symptom present > 24 hours    Protocols used: NEUROLOGIC DEFICIT-A-AH

## 2020-11-16 ENCOUNTER — NURSE TRIAGE (OUTPATIENT)
Dept: ADMINISTRATIVE | Facility: CLINIC | Age: 55
End: 2020-11-16

## 2020-11-16 DIAGNOSIS — R06.00 DYSPNEA, UNSPECIFIED TYPE: Primary | ICD-10-CM

## 2020-11-17 NOTE — TELEPHONE ENCOUNTER
Labs done about 2 weeks ago blood count stable  Metabolic panel stable  Last CT scan 9/2020 no fluid in lungs.    Can he come in for evaluation? Any available provider.   Alternative is to check a CXR to rule out anything new. I will order

## 2020-11-17 NOTE — TELEPHONE ENCOUNTER
"Pt has a cough, congestion and he is a chemo patient. Has had cough for 4-5 days and is present only when lying down. Thinks that it may be fluid. Has SOB when moving around. Last chemo treatment one week ago.    Reason for Disposition   Difficulty breathing, severe   [1] MODERATE longstanding difficulty breathing (e.g., speaks in phrases, SOB even at rest, pulse 100-120) AND [2] SAME as normal    Additional Information   Negative: [1] Breathing stopped AND [2] hasn't returned   Negative: Choking on something   Negative: Severe difficulty breathing (e.g., struggling for each breath, speaks in single words)   Negative: Bluish (or gray) lips or face now   Negative: Difficult to awaken or acting confused (e.g., disoriented, slurred speech)   Negative: Passed out (i.e., lost consciousness, collapsed and was not responding)   Negative: Wheezing started suddenly after medicine, an allergic food or bee sting   Negative: Stridor   Negative: Slow, shallow and weak breathing   Negative: Sounds like a life-threatening emergency to the triager   Negative: Chest pain   Negative: [1] Wheezing (high pitched whistling sound) AND [2] previous asthma attacks or use of asthma medicines   Negative: [1] Difficulty breathing AND [2] only present when coughing   Negative: [1] Difficulty breathing AND [2] only from stuffy or runny nose   Negative: [1] Difficulty breathing AND [2] within 14 days of COVID-19 Exposure   Negative: [1] MODERATE difficulty breathing (e.g., speaks in phrases, SOB even at rest, pulse 100-120) AND [2] NEW-onset or WORSE than normal   Negative: Wheezing can be heard across the room   Negative: Drooling or spitting out saliva (because can't swallow)   Negative: History of prior "blood clot" in leg or lungs (i.e., deep vein thrombosis, pulmonary embolism)   Negative: History of inherited increased risk of blood clots (e.g., Factor 5 Leiden, Anti-thrombin 3, Protein C or Protein S deficiency, " "Prothrombin mutation)   Negative: Major surgery in the past month   Negative: Hip or leg fracture (broken bone) in past month (or had cast on leg or ankle in past month)   Negative: Illness requiring prolonged bedrest in past month (e.g., immobilization, long hospital stay)   Negative: Long-distance travel in past month (e.g., car, bus, train, plane; with trip lasting 6 or more hours)   Negative: Extra heart beats OR irregular heart beating   (i.e., "palpitations")   Negative: Fever > 103 F (39.4 C)   Negative: [1] Fever > 101 F (38.3 C) AND [2] age > 60   Negative: [1] Fever > 100.0 F (37.8 C) AND [2] bedridden (e.g., nursing home patient, CVA, chronic illness, recovering from surgery)   Negative: [1] Fever > 100.0 F (37.8 C) AND [2] diabetes mellitus or weak immune system (e.g., HIV positive, cancer chemo, splenectomy, organ transplant, chronic steroids)   Negative: [1] Periods where breathing stops and then resumes normally AND [2] bedridden (e.g., nursing home patient, CVA)   Negative: Pregnant or postpartum (< 1 month since delivery)   Negative: Patient sounds very sick or weak to the triager   Negative: [1] MILD difficulty breathing (e.g., minimal/no SOB at rest, SOB with walking, pulse <100) AND [2] NEW-onset or WORSE than normal   Negative: [1] Longstanding difficulty breathing (e.g., CHF, COPD, emphysema) AND [2] WORSE than normal   Negative: [1] Longstanding difficulty breathing AND [2] not responding to usual therapy   Negative: [1] Continuous (nonstop) coughing AND [2] keeps from working or sleeping    Protocols used: RESPIRATORY MULTIPLE SYMPTOMS - GUIDELINE [id number], BREATHING DIFFICULTY-A-AH      "

## 2020-11-18 ENCOUNTER — CLINICAL SUPPORT (OUTPATIENT)
Dept: SMOKING CESSATION | Facility: CLINIC | Age: 55
End: 2020-11-18
Payer: COMMERCIAL

## 2020-11-18 DIAGNOSIS — F17.200 NICOTINE DEPENDENCE: Primary | ICD-10-CM

## 2020-11-18 PROCEDURE — 99407 BEHAV CHNG SMOKING > 10 MIN: CPT | Mod: S$GLB,,,

## 2020-11-18 PROCEDURE — 99407 PR TOBACCO USE CESSATION INTENSIVE >10 MINUTES: ICD-10-PCS | Mod: S$GLB,,,

## 2020-11-18 NOTE — PROGRESS NOTES
Called pt to f/u on his 6 month smoking cessation quit status. Pt stated he is still smoking. Informed him he has benefits available and is able to rejoin. Pt not ready to make appointment. He will call back when ready. Informed him of benefit period, phone follow ups, and contact information. Will complete smart form and will continue to follow up on quit #1 episode.

## 2020-11-19 ENCOUNTER — HOSPITAL ENCOUNTER (OUTPATIENT)
Dept: RADIOLOGY | Facility: HOSPITAL | Age: 55
Discharge: HOME | End: 2020-11-19
Attending: INTERNAL MEDICINE
Payer: MEDICAID

## 2020-11-19 ENCOUNTER — TELEPHONE (OUTPATIENT)
Dept: FAMILY MEDICINE | Facility: CLINIC | Age: 55
End: 2020-11-19

## 2020-11-19 DIAGNOSIS — R06.00 DYSPNEA, UNSPECIFIED TYPE: ICD-10-CM

## 2020-11-19 DIAGNOSIS — C34.91 ADENOCARCINOMA, LUNG, RIGHT: ICD-10-CM

## 2020-11-19 PROCEDURE — 71046 XR CHEST PA AND LATERAL: ICD-10-PCS | Mod: 26,,, | Performed by: RADIOLOGY

## 2020-11-19 PROCEDURE — 71046 X-RAY EXAM CHEST 2 VIEWS: CPT | Mod: TC,FY,PO

## 2020-11-19 PROCEDURE — 71046 X-RAY EXAM CHEST 2 VIEWS: CPT | Mod: 26,,, | Performed by: RADIOLOGY

## 2020-11-19 RX ORDER — FOLIC ACID 1 MG/1
1 TABLET ORAL DAILY
Qty: 30 TABLET | Refills: 6 | Status: SHIPPED | OUTPATIENT
Start: 2020-11-19 | End: 2021-02-17 | Stop reason: SDUPTHER

## 2020-11-20 NOTE — PROGRESS NOTES
No acute intrathoracic process.     Stable opacity in the right upper lung zone and the right hilar region.    Stable

## 2020-11-23 DIAGNOSIS — R52 PAIN MANAGEMENT: ICD-10-CM

## 2020-11-23 DIAGNOSIS — Z51.5 PALLIATIVE CARE ENCOUNTER: ICD-10-CM

## 2020-11-23 DIAGNOSIS — C34.91 ADENOCARCINOMA, LUNG, RIGHT: ICD-10-CM

## 2020-11-23 RX ORDER — OXYCODONE AND ACETAMINOPHEN 10; 325 MG/1; MG/1
1 TABLET ORAL EVERY 4 HOURS PRN
Qty: 90 TABLET | Refills: 0 | Status: CANCELLED | OUTPATIENT
Start: 2020-11-23

## 2020-11-23 RX ORDER — OXYCODONE AND ACETAMINOPHEN 10; 325 MG/1; MG/1
1 TABLET ORAL EVERY 4 HOURS PRN
Qty: 90 TABLET | Refills: 0 | Status: SHIPPED | OUTPATIENT
Start: 2020-11-23 | End: 2020-12-14

## 2020-11-25 NOTE — PROGRESS NOTES
Chief Complaint :  Lung Cancer    Hx of Present illness :  Patient is a 55 y.o. year old male who presents to the clinic today for  Chemotherapy followup. Known Dx of Metastatic Non small Cell Lung Cancer. Seeing patient for . Presently on Ramucirumab and Taxotere q 3 weeks. Also followed by palliative medicine for pain managemrnt.      Allergies :    Review of patient's allergies indicates:   Allergen Reactions    Tramadol Other (See Comments)     Chest burning  Chest burning       Occupation :  Retired     Transfusion :  None    Menstrual & obstetric Hx :  N/A      Present Meds :   Medication List with Changes/Refills   Current Medications    ACETAMINOPHEN (TYLENOL) 325 MG TABLET    Take 325 mg by mouth every 6 (six) hours as needed for Pain.    ALBUTEROL (PROVENTIL/VENTOLIN HFA) 90 MCG/ACTUATION INHALER    INHALE 2 PUFFS BY MOUTH EVERY 6 HOURS AS NEEDED FOR WHEEZING    AMOXICILLIN-CLAVULANATE 875-125MG (AUGMENTIN) 875-125 MG PER TABLET    Take one tablet by mouth every 12 hours for 5 days    ASPIRIN (ECOTRIN) 81 MG EC TABLET    Take 1 tablet (81 mg total) by mouth once daily.    ATORVASTATIN (LIPITOR) 80 MG TABLET    Take 1 tablet (80 mg total) by mouth once daily.    BLOOD-GLUCOSE METER KIT    To check BG 1 times daily, to use with insurance preferred meter    BUPROPION (WELLBUTRIN SR) 150 MG TBSR 12 HR TABLET    Take 1 tablet (150 mg total) by mouth 2 (two) times daily.    CYPROHEPTADINE (PERIACTIN) 4 MG TABLET    Take 1 tablet (4 mg total) by mouth 4 (four) times daily.    DEXAMETHASONE (DECADRON) 4 MG TAB    Take 2 tablets (8 mg total) by mouth every 12 (twelve) hours. Take 2 tablets (8mg total) by mouth every 12 hours the day before chemo, the day of chemo and the day after chemo.    FOLIC ACID (FOLVITE) 1 MG TABLET    Take 1 tablet (1 mg total) by mouth once daily.    GABAPENTIN (NEURONTIN) 600 MG TABLET    Take 1 tablet (600 mg total) by mouth 3 (three) times daily.     HYDROCHLOROTHIAZIDE (HYDRODIURIL) 25 MG TABLET    TK 1 T PO ONE TIME PER DAY FOR BP    MELATONIN (MELATIN) 3 MG TABLET    Take 1 tablet (3 mg total) by mouth nightly.    METFORMIN (GLUCOPHAGE) 500 MG TABLET    1 po qAM x 1 wk; then 1 po BID x 1 wk; then 2 AM/1 PM x 1 wk; then 2 BID maintenance. Take with food    METOPROLOL SUCCINATE (TOPROL-XL) 25 MG 24 HR TABLET    Take 1 tablet (25 mg total) by mouth once daily.    MORPHINE (MS CONTIN) 100 MG 12 HR TABLET    Take 1 tablet (100 mg total) by mouth every 8 (eight) hours.    ONDANSETRON (ZOFRAN) 4 MG TABLET    Take 1 tablet (4 mg total) by mouth every 6 (six) hours as needed for Nausea.    OXYCODONE-ACETAMINOPHEN (PERCOCET)  MG PER TABLET    Take 1 tablet by mouth every 4 (four) hours as needed for Pain.    POLYETHYLENE GLYCOL (MIRALAX) 17 GRAM PWPK    Take 17 g by mouth 2 (two) times daily as needed (Constipation).    POTASSIUM CHLORIDE SA (K-DUR,KLOR-CON) 20 MEQ TABLET    Take 2 tablets (40 mEq total) by mouth once daily.    SENNA-DOCUSATE 8.6-50 MG (PERICOLACE) 8.6-50 MG PER TABLET    Take 2 tablets by mouth once daily.    TRAZODONE (DESYREL) 50 MG TABLET    Take 1 tablet (50 mg total) by mouth every evening.       Past Medical Hx :  Reviewed.    Past Medical Hx :  Past Medical History:   Diagnosis Date    CAD (coronary artery disease) 2013    one stent placed    DMII (diabetes mellitus, type 2)     Hypertension     Lung cancer     Mass of right lung     Mass of upper lobe of right lung 3/16/2020    3/13/2020 CT chest: Right upper lobe mass obliterating the anterior segmental bronchus. This is associated with right hilar and paramediastinal adenopathy.  This is concerning for bronchogenic carcinoma.  Sampling recommended.       Travel Hx :   N/A    Immunization :  Immunization History   Administered Date(s) Administered    DT (Pediatric) 09/12/2009    Influenza - Quadrivalent - PF *Preferred* (6 months and older) 03/06/2020    Pneumococcal  Polysaccharide - 23 Valent 03/06/2020       Family Hx :  Family History   Problem Relation Age of Onset    Peripheral vascular disease Mother     Heart disease Father     No Known Problems Brother     No Known Problems Brother     No Known Problems Son     No Known Problems Son     No Known Problems Daughter     No Known Problems Daughter     No Known Problems Daughter        Social Hx :  Social History     Socioeconomic History    Marital status: Single     Spouse name: Not on file    Number of children: Not on file    Years of education: Not on file    Highest education level: Not on file   Occupational History    Occupation:      Employer: [employer]   Social Needs    Financial resource strain: Not on file    Food insecurity     Worry: Not on file     Inability: Not on file    Transportation needs     Medical: Not on file     Non-medical: Not on file   Tobacco Use    Smoking status: Current Every Day Smoker     Packs/day: 2.00     Years: 30.00     Pack years: 60.00     Types: Cigarettes    Smokeless tobacco: Never Used   Substance and Sexual Activity    Alcohol use: Yes     Alcohol/week: 3.0 standard drinks     Types: 3 Shots of liquor per week     Frequency: 2-4 times a month    Drug use: No    Sexual activity: Yes     Partners: Female   Lifestyle    Physical activity     Days per week: Not on file     Minutes per session: Not on file    Stress: Not on file   Relationships    Social connections     Talks on phone: Not on file     Gets together: Not on file     Attends Anabaptist service: Not on file     Active member of club or organization: Not on file     Attends meetings of clubs or organizations: Not on file     Relationship status: Not on file   Other Topics Concern    Not on file   Social History Narrative    Not on file       Surgery :  Right Lung bx; Colonoscopy  Pending.    Symptoms :    Review of Systems   Constitutional: Positive for fatigue, fever and  malaise/fatigue. Negative for appetite change, chills, diaphoresis and unexpected weight change.        Good appetite Picking weight Back again   HENT: Positive for nosebleeds. Negative for congestion and sore throat.    Eyes: Negative for blurred vision, double vision, photophobia and itching.   Respiratory: Positive for cough and shortness of breath. Negative for apnea.    Cardiovascular: Positive for chest pain. Negative for palpitations and leg swelling.   Gastrointestinal: Positive for constipation and heartburn. Negative for abdominal pain, diarrhea, nausea and vomiting.   Genitourinary: Negative for difficulty urinating, dysuria, flank pain, frequency, hematuria and urgency.   Musculoskeletal: Positive for back pain and myalgias.        Pain in his lateral right chest   Skin: Negative for color change and rash.   Neurological: Positive for sensory change. Negative for dizziness, tingling, tremors, facial asymmetry and headaches.   Endo/Heme/Allergies: Negative for environmental allergies, polydipsia, polyphagia and adenopathy. Does not bruise/bleed easily.   Psychiatric/Behavioral: Negative for confusion, depression and hallucinations. The patient is not nervous/anxious.        Physical Exam :   Physical Exam   Constitutional: He is oriented to person, place, and time and well-developed, well-nourished, and in no distress. He appears well-developed. No distress.   HENT:   Head: Normocephalic and atraumatic.   Right Ear: Tympanic membrane normal.   Left Ear: Tympanic membrane normal.   Nose: Nose normal.   Mouth/Throat: Oropharynx is clear.   Eyes: Pupils are equal, round, and reactive to light. Conjunctivae are normal.   Neck: Normal range of motion. Neck supple. No JVD present. No tracheal deviation present. No thyromegaly present.   Cardiovascular: Normal rate, regular rhythm, normal heart sounds and intact distal pulses. Exam reveals no gallop and no friction rub.   No murmur heard.  Pulmonary/Chest:  Effort normal and breath sounds normal. No respiratory distress. He has no wheezes. He has no rales. He exhibits no tenderness.   Abdominal: Soft. Bowel sounds are normal. He exhibits no distension and no mass. There is no abdominal tenderness. There is no rebound.   Musculoskeletal: Normal range of motion.      Comments: PORT in right chest   Lymphadenopathy:     He has no cervical adenopathy.        Right: No supraclavicular adenopathy present.        Left: No supraclavicular adenopathy present.   Neurological: He is alert and oriented to person, place, and time.   Skin: Skin is warm and dry. No rash noted. He is not diaphoretic. No cyanosis or erythema. Nails show clubbing.        Psychiatric: His behavior is normal. Mood, memory, affect and judgment normal.   Nursing note and vitals reviewed.        Labs & Imaging :  11/27/2020 : Hgb 12.1; Hct 38.5; MCV 86; Platelets 401,000. ANC 6,500. U/A normal.        Dx :  . Chemotherapy followup. Non small cell Lung Cancer      Assessment & Plan:  Reviewed with patient and spouse.  Cleared for Rx today.  followup with .  Has issues with erectile dysfunction. Refer to .      Advance Care Planning     Power of   I initiated the process of advance care planning today and explained the importance of this process to the patient.  I introduced the concept of advance directives to the patient, as well. Then the patient received detailed information about the importance of designating a Health Care Power of  (HCPOA). He was also instructed to communicate with this person about their wishes for future healthcare, should he become sick and lose decision-making capacity. The patient has not previously appointed a HCPOA. After our discussion, the patient has decided to complete a HCPOA and will bring the form completed to his next clinic appointment.                -

## 2020-11-27 ENCOUNTER — INFUSION (OUTPATIENT)
Dept: INFUSION THERAPY | Facility: HOSPITAL | Age: 55
End: 2020-11-27
Attending: INTERNAL MEDICINE
Payer: MEDICAID

## 2020-11-27 ENCOUNTER — OFFICE VISIT (OUTPATIENT)
Dept: HEMATOLOGY/ONCOLOGY | Facility: CLINIC | Age: 55
End: 2020-11-27
Payer: MEDICAID

## 2020-11-27 VITALS
WEIGHT: 165.38 LBS | TEMPERATURE: 98 F | DIASTOLIC BLOOD PRESSURE: 77 MMHG | HEART RATE: 86 BPM | SYSTOLIC BLOOD PRESSURE: 130 MMHG | HEIGHT: 68 IN | OXYGEN SATURATION: 98 % | BODY MASS INDEX: 25.07 KG/M2

## 2020-11-27 VITALS
HEART RATE: 85 BPM | DIASTOLIC BLOOD PRESSURE: 73 MMHG | OXYGEN SATURATION: 97 % | TEMPERATURE: 98 F | SYSTOLIC BLOOD PRESSURE: 122 MMHG | RESPIRATION RATE: 17 BRPM

## 2020-11-27 DIAGNOSIS — N52.9 ERECTILE DYSFUNCTION, UNSPECIFIED ERECTILE DYSFUNCTION TYPE: ICD-10-CM

## 2020-11-27 DIAGNOSIS — Z09 CHEMOTHERAPY FOLLOW-UP EXAMINATION: ICD-10-CM

## 2020-11-27 DIAGNOSIS — C34.91 ADENOCARCINOMA, LUNG, RIGHT: Primary | ICD-10-CM

## 2020-11-27 PROCEDURE — 99999 PR PBB SHADOW E&M-EST. PATIENT-LVL V: CPT | Mod: PBBFAC,,, | Performed by: INTERNAL MEDICINE

## 2020-11-27 PROCEDURE — 96367 TX/PROPH/DG ADDL SEQ IV INF: CPT

## 2020-11-27 PROCEDURE — 99214 OFFICE O/P EST MOD 30 MIN: CPT | Mod: S$PBB,,, | Performed by: INTERNAL MEDICINE

## 2020-11-27 PROCEDURE — A4216 STERILE WATER/SALINE, 10 ML: HCPCS | Performed by: INTERNAL MEDICINE

## 2020-11-27 PROCEDURE — 99214 PR OFFICE/OUTPT VISIT, EST, LEVL IV, 30-39 MIN: ICD-10-PCS | Mod: S$PBB,,, | Performed by: INTERNAL MEDICINE

## 2020-11-27 PROCEDURE — 99999 PR PBB SHADOW E&M-EST. PATIENT-LVL V: ICD-10-PCS | Mod: PBBFAC,,, | Performed by: INTERNAL MEDICINE

## 2020-11-27 PROCEDURE — 25000003 PHARM REV CODE 250: Performed by: INTERNAL MEDICINE

## 2020-11-27 PROCEDURE — 99215 OFFICE O/P EST HI 40 MIN: CPT | Mod: PBBFAC,25 | Performed by: INTERNAL MEDICINE

## 2020-11-27 PROCEDURE — 96417 CHEMO IV INFUS EACH ADDL SEQ: CPT

## 2020-11-27 PROCEDURE — 96413 CHEMO IV INFUSION 1 HR: CPT

## 2020-11-27 PROCEDURE — 63600175 PHARM REV CODE 636 W HCPCS: Mod: JG | Performed by: INTERNAL MEDICINE

## 2020-11-27 RX ORDER — HEPARIN 100 UNIT/ML
500 SYRINGE INTRAVENOUS
Status: CANCELLED | OUTPATIENT
Start: 2020-11-27

## 2020-11-27 RX ORDER — SODIUM CHLORIDE 0.9 % (FLUSH) 0.9 %
10 SYRINGE (ML) INJECTION
Status: CANCELLED | OUTPATIENT
Start: 2020-11-27

## 2020-11-27 RX ORDER — SODIUM CHLORIDE 0.9 % (FLUSH) 0.9 %
10 SYRINGE (ML) INJECTION
Status: DISCONTINUED | OUTPATIENT
Start: 2020-11-27 | End: 2020-11-27 | Stop reason: HOSPADM

## 2020-11-27 RX ORDER — HEPARIN 100 UNIT/ML
500 SYRINGE INTRAVENOUS
Status: DISCONTINUED | OUTPATIENT
Start: 2020-11-27 | End: 2020-11-27 | Stop reason: HOSPADM

## 2020-11-27 RX ADMIN — DOCETAXEL 140 MG: 20 INJECTION, SOLUTION, CONCENTRATE INTRAVENOUS at 02:11

## 2020-11-27 RX ADMIN — SODIUM CHLORIDE 700 MG: 0.9 INJECTION, SOLUTION INTRAVENOUS at 01:11

## 2020-11-27 RX ADMIN — HEPARIN 500 UNITS: 100 SYRINGE at 03:11

## 2020-11-27 RX ADMIN — DEXAMETHASONE SODIUM PHOSPHATE 20 MG: 10 INJECTION, SOLUTION INTRAMUSCULAR; INTRAVENOUS at 12:11

## 2020-11-27 RX ADMIN — Medication 10 ML: at 03:11

## 2020-11-27 RX ADMIN — DIPHENHYDRAMINE HYDROCHLORIDE 50 MG: 50 INJECTION INTRAMUSCULAR; INTRAVENOUS at 12:11

## 2020-11-27 NOTE — PLAN OF CARE
Pt arrived to unit. Cleared for chemo. Pt states he feels his percocet is not covering his pain. Message left for Nellie Herrera NP to call pt. Pt has good appetite. No reported side effects of chemo. Pt tolerated premeds, Cyramza and Taxotere. No reactions noted. AVS given to pt. Pt ambulated off unit. No distress noted.

## 2020-12-02 DIAGNOSIS — R63.0 ANOREXIA: ICD-10-CM

## 2020-12-02 DIAGNOSIS — R52 PAIN MANAGEMENT: Primary | ICD-10-CM

## 2020-12-02 DIAGNOSIS — G47.00 INSOMNIA, UNSPECIFIED TYPE: ICD-10-CM

## 2020-12-02 RX ORDER — FENTANYL 100 UG/H
1 PATCH TRANSDERMAL
Qty: 10 PATCH | Refills: 0 | Status: SHIPPED | OUTPATIENT
Start: 2020-12-02 | End: 2020-12-17

## 2020-12-02 RX ORDER — FENTANYL 75 UG/H
1 PATCH TRANSDERMAL
Qty: 5 PATCH | Refills: 0 | Status: SHIPPED | OUTPATIENT
Start: 2020-12-02 | End: 2020-12-17

## 2020-12-02 RX ORDER — CYPROHEPTADINE HYDROCHLORIDE 4 MG/1
4 TABLET ORAL 4 TIMES DAILY
Qty: 120 TABLET | Refills: 0 | Status: SHIPPED | OUTPATIENT
Start: 2020-12-02 | End: 2021-01-26

## 2020-12-02 RX ORDER — TALC
3 POWDER (GRAM) TOPICAL NIGHTLY
Refills: 0 | COMMUNITY
Start: 2020-12-02 | End: 2021-04-07 | Stop reason: SDUPTHER

## 2020-12-02 NOTE — TELEPHONE ENCOUNTER
Patient would like a return call to discuss about getting a pain reliever cream. Patient number [phone].    I can call him after I see patients.

## 2020-12-02 NOTE — PROGRESS NOTES
Infusion nurse left VM message on phone 11/30/20 that patient in infusion and not getting releif from current pain regimen. I was out of town so I did not get my phone message until return on 12/2. Patient called office on 12/2 morning and requested a cream for pain to put on all areas of pain. Lidocaine patches are not covered by his insurance and other creams would be of no benefit. After discussion we agreed upon change in regimen.Patient using total of 390 MME daily and this converted to Fentanyl patch 162.5 mcg however does not come in this amount so will order Duragesic 175 mcg consisting of a patch of 100 and a patch of 75. Morphine ER will be discontinued.   He will continue the Percocet for breakthrough

## 2020-12-10 ENCOUNTER — PATIENT MESSAGE (OUTPATIENT)
Dept: PALLIATIVE MEDICINE | Facility: CLINIC | Age: 55
End: 2020-12-10

## 2020-12-10 DIAGNOSIS — C34.91 ADENOCARCINOMA, LUNG, RIGHT: ICD-10-CM

## 2020-12-10 DIAGNOSIS — Z51.5 PALLIATIVE CARE ENCOUNTER: ICD-10-CM

## 2020-12-10 DIAGNOSIS — R52 PAIN MANAGEMENT: ICD-10-CM

## 2020-12-10 RX ORDER — OXYCODONE AND ACETAMINOPHEN 10; 325 MG/1; MG/1
1 TABLET ORAL EVERY 4 HOURS PRN
Qty: 90 TABLET | Refills: 0 | OUTPATIENT
Start: 2020-12-10

## 2020-12-11 DIAGNOSIS — I10 ESSENTIAL HYPERTENSION: ICD-10-CM

## 2020-12-11 RX ORDER — HYDROCHLOROTHIAZIDE 25 MG/1
TABLET ORAL
Qty: 90 TABLET | Refills: 3 | Status: SHIPPED | OUTPATIENT
Start: 2020-12-11

## 2020-12-14 ENCOUNTER — TELEPHONE (OUTPATIENT)
Dept: HEMATOLOGY/ONCOLOGY | Facility: CLINIC | Age: 55
End: 2020-12-14

## 2020-12-14 DIAGNOSIS — M79.2 NEUROPATHIC PAIN OF SHOULDER, RIGHT: ICD-10-CM

## 2020-12-14 DIAGNOSIS — Z51.5 PALLIATIVE CARE ENCOUNTER: ICD-10-CM

## 2020-12-14 DIAGNOSIS — R52 PAIN MANAGEMENT: ICD-10-CM

## 2020-12-14 DIAGNOSIS — C34.91 ADENOCARCINOMA, LUNG, RIGHT: ICD-10-CM

## 2020-12-14 DIAGNOSIS — G89.3 CANCER RELATED PAIN: ICD-10-CM

## 2020-12-14 RX ORDER — OXYCODONE AND ACETAMINOPHEN 10; 325 MG/1; MG/1
1 TABLET ORAL EVERY 4 HOURS PRN
Qty: 90 TABLET | Refills: 0 | Status: SHIPPED | OUTPATIENT
Start: 2020-12-14 | End: 2020-12-30 | Stop reason: SDUPTHER

## 2020-12-17 DIAGNOSIS — R52 PAIN MANAGEMENT: Primary | ICD-10-CM

## 2020-12-17 RX ORDER — MORPHINE SULFATE 15 MG/1
15 TABLET, FILM COATED, EXTENDED RELEASE ORAL EVERY 8 HOURS
Qty: 90 TABLET | Refills: 0 | Status: SHIPPED | OUTPATIENT
Start: 2020-12-17 | End: 2021-01-06 | Stop reason: DRUGHIGH

## 2020-12-17 RX ORDER — MORPHINE SULFATE 100 MG/1
100 TABLET, FILM COATED, EXTENDED RELEASE ORAL EVERY 8 HOURS
Qty: 90 TABLET | Refills: 0 | Status: SHIPPED | OUTPATIENT
Start: 2020-12-17 | End: 2021-01-06 | Stop reason: ALTCHOICE

## 2020-12-17 RX ORDER — MORPHINE SULFATE 15 MG/1
15 TABLET, FILM COATED, EXTENDED RELEASE ORAL EVERY 8 HOURS
Qty: 90 TABLET | Refills: 0 | Status: SHIPPED | OUTPATIENT
Start: 2020-12-17 | End: 2020-12-17 | Stop reason: SDUPTHER

## 2020-12-26 RX ORDER — GABAPENTIN 600 MG/1
600 TABLET ORAL 3 TIMES DAILY
Qty: 60 TABLET | Refills: 2 | Status: SHIPPED | OUTPATIENT
Start: 2020-12-26 | End: 2020-12-28 | Stop reason: SDUPTHER

## 2020-12-28 DIAGNOSIS — G89.3 CANCER RELATED PAIN: ICD-10-CM

## 2020-12-28 DIAGNOSIS — R52 PAIN MANAGEMENT: ICD-10-CM

## 2020-12-28 DIAGNOSIS — C34.91 ADENOCARCINOMA, LUNG, RIGHT: ICD-10-CM

## 2020-12-28 DIAGNOSIS — M79.2 NEUROPATHIC PAIN OF SHOULDER, RIGHT: ICD-10-CM

## 2020-12-28 DIAGNOSIS — Z51.5 PALLIATIVE CARE ENCOUNTER: ICD-10-CM

## 2020-12-28 RX ORDER — OXYCODONE AND ACETAMINOPHEN 10; 325 MG/1; MG/1
1 TABLET ORAL EVERY 4 HOURS PRN
Qty: 90 TABLET | Refills: 0 | OUTPATIENT
Start: 2020-12-28

## 2020-12-28 RX ORDER — GABAPENTIN 600 MG/1
600 TABLET ORAL 3 TIMES DAILY
Qty: 90 TABLET | Refills: 0 | Status: SHIPPED | OUTPATIENT
Start: 2020-12-28 | End: 2021-01-24 | Stop reason: SDUPTHER

## 2020-12-30 DIAGNOSIS — C34.91 ADENOCARCINOMA, LUNG, RIGHT: ICD-10-CM

## 2020-12-30 DIAGNOSIS — Z51.5 PALLIATIVE CARE ENCOUNTER: ICD-10-CM

## 2020-12-30 DIAGNOSIS — R52 PAIN MANAGEMENT: ICD-10-CM

## 2020-12-30 RX ORDER — OXYCODONE AND ACETAMINOPHEN 10; 325 MG/1; MG/1
1 TABLET ORAL EVERY 4 HOURS PRN
Qty: 30 TABLET | Refills: 0 | Status: SHIPPED | OUTPATIENT
Start: 2020-12-30 | End: 2021-01-06

## 2020-12-31 ENCOUNTER — LAB VISIT (OUTPATIENT)
Dept: LAB | Facility: HOSPITAL | Age: 55
End: 2020-12-31
Attending: INTERNAL MEDICINE
Payer: MEDICAID

## 2020-12-31 DIAGNOSIS — C34.91 ADENOCARCINOMA, LUNG, RIGHT: ICD-10-CM

## 2020-12-31 DIAGNOSIS — Z09 CHEMOTHERAPY FOLLOW-UP EXAMINATION: ICD-10-CM

## 2020-12-31 LAB
ALBUMIN SERPL BCP-MCNC: 3.3 G/DL (ref 3.5–5.2)
ALP SERPL-CCNC: 108 U/L (ref 55–135)
ALT SERPL W/O P-5'-P-CCNC: 6 U/L (ref 10–44)
ANION GAP SERPL CALC-SCNC: 11 MMOL/L (ref 8–16)
AST SERPL-CCNC: 11 U/L (ref 10–40)
BASOPHILS # BLD AUTO: 0.03 K/UL (ref 0–0.2)
BASOPHILS NFR BLD: 0.4 % (ref 0–1.9)
BILIRUB SERPL-MCNC: 0.4 MG/DL (ref 0.1–1)
BUN SERPL-MCNC: 12 MG/DL (ref 6–20)
CALCIUM SERPL-MCNC: 9.5 MG/DL (ref 8.7–10.5)
CHLORIDE SERPL-SCNC: 99 MMOL/L (ref 95–110)
CO2 SERPL-SCNC: 27 MMOL/L (ref 23–29)
CREAT SERPL-MCNC: 0.8 MG/DL (ref 0.5–1.4)
DIFFERENTIAL METHOD: ABNORMAL
EOSINOPHIL # BLD AUTO: 0.5 K/UL (ref 0–0.5)
EOSINOPHIL NFR BLD: 6.3 % (ref 0–8)
ERYTHROCYTE [DISTWIDTH] IN BLOOD BY AUTOMATED COUNT: 18.6 % (ref 11.5–14.5)
EST. GFR  (AFRICAN AMERICAN): >60 ML/MIN/1.73 M^2
EST. GFR  (NON AFRICAN AMERICAN): >60 ML/MIN/1.73 M^2
GLUCOSE SERPL-MCNC: 100 MG/DL (ref 70–110)
HCT VFR BLD AUTO: 40.7 % (ref 40–54)
HGB BLD-MCNC: 12.5 G/DL (ref 14–18)
IMM GRANULOCYTES # BLD AUTO: 0.02 K/UL (ref 0–0.04)
IMM GRANULOCYTES NFR BLD AUTO: 0.3 % (ref 0–0.5)
LYMPHOCYTES # BLD AUTO: 3 K/UL (ref 1–4.8)
LYMPHOCYTES NFR BLD: 37.5 % (ref 18–48)
MCH RBC QN AUTO: 26.2 PG (ref 27–31)
MCHC RBC AUTO-ENTMCNC: 30.7 G/DL (ref 32–36)
MCV RBC AUTO: 85 FL (ref 82–98)
MONOCYTES # BLD AUTO: 1.4 K/UL (ref 0.3–1)
MONOCYTES NFR BLD: 17.3 % (ref 4–15)
NEUTROPHILS # BLD AUTO: 3.1 K/UL (ref 1.8–7.7)
NEUTROPHILS NFR BLD: 38.2 % (ref 38–73)
NRBC BLD-RTO: 0 /100 WBC
PLATELET # BLD AUTO: 332 K/UL (ref 150–350)
PMV BLD AUTO: 8.8 FL (ref 9.2–12.9)
POTASSIUM SERPL-SCNC: 3.7 MMOL/L (ref 3.5–5.1)
PROT SERPL-MCNC: 7.3 G/DL (ref 6–8.4)
RBC # BLD AUTO: 4.77 M/UL (ref 4.6–6.2)
SODIUM SERPL-SCNC: 137 MMOL/L (ref 136–145)
TSH SERPL DL<=0.005 MIU/L-ACNC: 3.12 UIU/ML (ref 0.4–4)
WBC # BLD AUTO: 8 K/UL (ref 3.9–12.7)

## 2020-12-31 PROCEDURE — 84443 ASSAY THYROID STIM HORMONE: CPT

## 2020-12-31 PROCEDURE — 85025 COMPLETE CBC W/AUTO DIFF WBC: CPT

## 2020-12-31 PROCEDURE — 36415 COLL VENOUS BLD VENIPUNCTURE: CPT

## 2020-12-31 PROCEDURE — 80053 COMPREHEN METABOLIC PANEL: CPT

## 2021-01-05 ENCOUNTER — OFFICE VISIT (OUTPATIENT)
Dept: HEMATOLOGY/ONCOLOGY | Facility: CLINIC | Age: 56
End: 2021-01-05
Payer: MEDICAID

## 2021-01-05 VITALS
SYSTOLIC BLOOD PRESSURE: 130 MMHG | BODY MASS INDEX: 25.13 KG/M2 | TEMPERATURE: 98 F | DIASTOLIC BLOOD PRESSURE: 88 MMHG | HEART RATE: 108 BPM | OXYGEN SATURATION: 97 % | WEIGHT: 165.81 LBS | HEIGHT: 68 IN

## 2021-01-05 DIAGNOSIS — K59.03 THERAPEUTIC OPIOID INDUCED CONSTIPATION: ICD-10-CM

## 2021-01-05 DIAGNOSIS — E87.6 HYPOKALEMIA: ICD-10-CM

## 2021-01-05 DIAGNOSIS — E78.5 DYSLIPIDEMIA: ICD-10-CM

## 2021-01-05 DIAGNOSIS — G89.3 CANCER RELATED PAIN: ICD-10-CM

## 2021-01-05 DIAGNOSIS — E11.9 TYPE 2 DIABETES MELLITUS WITHOUT COMPLICATION, WITHOUT LONG-TERM CURRENT USE OF INSULIN: ICD-10-CM

## 2021-01-05 DIAGNOSIS — Z86.73 HISTORY OF CVA (CEREBROVASCULAR ACCIDENT): ICD-10-CM

## 2021-01-05 DIAGNOSIS — T40.2X5A THERAPEUTIC OPIOID INDUCED CONSTIPATION: ICD-10-CM

## 2021-01-05 DIAGNOSIS — I10 ESSENTIAL HYPERTENSION: ICD-10-CM

## 2021-01-05 DIAGNOSIS — C34.91 ADENOCARCINOMA, LUNG, RIGHT: Primary | ICD-10-CM

## 2021-01-05 DIAGNOSIS — Z72.0 TOBACCO ABUSE: ICD-10-CM

## 2021-01-05 PROCEDURE — 99214 OFFICE O/P EST MOD 30 MIN: CPT | Mod: S$PBB,,, | Performed by: INTERNAL MEDICINE

## 2021-01-05 PROCEDURE — 99215 OFFICE O/P EST HI 40 MIN: CPT | Mod: PBBFAC | Performed by: INTERNAL MEDICINE

## 2021-01-05 PROCEDURE — 99214 PR OFFICE/OUTPT VISIT, EST, LEVL IV, 30-39 MIN: ICD-10-PCS | Mod: S$PBB,,, | Performed by: INTERNAL MEDICINE

## 2021-01-05 PROCEDURE — 99999 PR PBB SHADOW E&M-EST. PATIENT-LVL V: ICD-10-PCS | Mod: PBBFAC,,, | Performed by: INTERNAL MEDICINE

## 2021-01-05 PROCEDURE — 99999 PR PBB SHADOW E&M-EST. PATIENT-LVL V: CPT | Mod: PBBFAC,,, | Performed by: INTERNAL MEDICINE

## 2021-01-05 RX ORDER — AMOXICILLIN 250 MG
2 CAPSULE ORAL DAILY
Qty: 60 TABLET | Refills: 6 | Status: SHIPPED | OUTPATIENT
Start: 2021-01-05 | End: 2021-04-07 | Stop reason: SDUPTHER

## 2021-01-05 RX ORDER — POLYETHYLENE GLYCOL 3350 17 G/17G
17 POWDER, FOR SOLUTION ORAL 2 TIMES DAILY PRN
Qty: 30 EACH | Refills: 6 | Status: SHIPPED | OUTPATIENT
Start: 2021-01-05 | End: 2021-04-07 | Stop reason: SDUPTHER

## 2021-01-05 RX ORDER — SODIUM CHLORIDE 0.9 % (FLUSH) 0.9 %
10 SYRINGE (ML) INJECTION
Status: CANCELLED | OUTPATIENT
Start: 2021-01-06

## 2021-01-05 RX ORDER — HEPARIN 100 UNIT/ML
500 SYRINGE INTRAVENOUS
Status: CANCELLED | OUTPATIENT
Start: 2021-01-06

## 2021-01-06 ENCOUNTER — INITIAL CONSULT (OUTPATIENT)
Dept: PALLIATIVE MEDICINE | Facility: CLINIC | Age: 56
End: 2021-01-06
Payer: MEDICAID

## 2021-01-06 ENCOUNTER — INFUSION (OUTPATIENT)
Dept: INFUSION THERAPY | Facility: HOSPITAL | Age: 56
End: 2021-01-06
Attending: INTERNAL MEDICINE
Payer: MEDICAID

## 2021-01-06 VITALS
DIASTOLIC BLOOD PRESSURE: 82 MMHG | OXYGEN SATURATION: 97 % | SYSTOLIC BLOOD PRESSURE: 141 MMHG | RESPIRATION RATE: 17 BRPM | TEMPERATURE: 98 F | HEART RATE: 94 BPM

## 2021-01-06 DIAGNOSIS — T40.2X5A CONSTIPATION DUE TO OPIOID THERAPY: Primary | ICD-10-CM

## 2021-01-06 DIAGNOSIS — K21.9 GASTROESOPHAGEAL REFLUX DISEASE WITHOUT ESOPHAGITIS: ICD-10-CM

## 2021-01-06 DIAGNOSIS — C34.91 ADENOCARCINOMA, LUNG, RIGHT: Primary | ICD-10-CM

## 2021-01-06 DIAGNOSIS — C80.1 ANXIETY ASSOCIATED WITH CANCER DIAGNOSIS: ICD-10-CM

## 2021-01-06 DIAGNOSIS — R52 PAIN MANAGEMENT: ICD-10-CM

## 2021-01-06 DIAGNOSIS — K59.03 CONSTIPATION DUE TO OPIOID THERAPY: Primary | ICD-10-CM

## 2021-01-06 DIAGNOSIS — F41.1 ANXIETY ASSOCIATED WITH CANCER DIAGNOSIS: ICD-10-CM

## 2021-01-06 PROCEDURE — A4216 STERILE WATER/SALINE, 10 ML: HCPCS | Performed by: INTERNAL MEDICINE

## 2021-01-06 PROCEDURE — 96417 CHEMO IV INFUS EACH ADDL SEQ: CPT

## 2021-01-06 PROCEDURE — 63600175 PHARM REV CODE 636 W HCPCS: Performed by: NURSE PRACTITIONER

## 2021-01-06 PROCEDURE — 96413 CHEMO IV INFUSION 1 HR: CPT

## 2021-01-06 PROCEDURE — 99215 OFFICE O/P EST HI 40 MIN: CPT | Mod: S$PBB,,, | Performed by: NURSE PRACTITIONER

## 2021-01-06 PROCEDURE — 25000003 PHARM REV CODE 250: Performed by: INTERNAL MEDICINE

## 2021-01-06 PROCEDURE — 99215 PR OFFICE/OUTPT VISIT, EST, LEVL V, 40-54 MIN: ICD-10-PCS | Mod: S$PBB,,, | Performed by: NURSE PRACTITIONER

## 2021-01-06 PROCEDURE — 63600175 PHARM REV CODE 636 W HCPCS: Performed by: INTERNAL MEDICINE

## 2021-01-06 PROCEDURE — 96375 TX/PRO/DX INJ NEW DRUG ADDON: CPT

## 2021-01-06 PROCEDURE — 96367 TX/PROPH/DG ADDL SEQ IV INF: CPT

## 2021-01-06 RX ORDER — OXYCODONE HYDROCHLORIDE 30 MG/1
30 TABLET ORAL EVERY 4 HOURS PRN
Qty: 90 TABLET | Refills: 0
Start: 2021-01-06 | End: 2021-02-01 | Stop reason: SDUPTHER

## 2021-01-06 RX ORDER — OXYCODONE HYDROCHLORIDE 30 MG/1
30 TABLET ORAL EVERY 4 HOURS PRN
Qty: 90 TABLET | Refills: 0 | Status: SHIPPED | OUTPATIENT
Start: 2021-01-06 | End: 2021-01-06 | Stop reason: SDUPTHER

## 2021-01-06 RX ORDER — HYDROMORPHONE HYDROCHLORIDE 2 MG/ML
1 INJECTION, SOLUTION INTRAMUSCULAR; INTRAVENOUS; SUBCUTANEOUS ONCE
Status: COMPLETED | OUTPATIENT
Start: 2021-01-06 | End: 2021-01-06

## 2021-01-06 RX ORDER — ALPRAZOLAM 0.5 MG/1
0.5 TABLET ORAL 3 TIMES DAILY PRN
Qty: 30 TABLET | Refills: 0 | Status: SHIPPED | OUTPATIENT
Start: 2021-01-06 | End: 2021-03-12 | Stop reason: SDUPTHER

## 2021-01-06 RX ORDER — FENTANYL 100 UG/H
1 PATCH TRANSDERMAL
Qty: 10 PATCH | Refills: 0 | Status: SHIPPED | OUTPATIENT
Start: 2021-01-06 | End: 2021-02-17 | Stop reason: SDUPTHER

## 2021-01-06 RX ORDER — FAMOTIDINE 40 MG/1
40 TABLET, FILM COATED ORAL NIGHTLY
Qty: 30 TABLET | Refills: 1 | Status: SHIPPED | OUTPATIENT
Start: 2021-01-06 | End: 2021-04-07 | Stop reason: SDUPTHER

## 2021-01-06 RX ORDER — SODIUM CHLORIDE 0.9 % (FLUSH) 0.9 %
10 SYRINGE (ML) INJECTION
Status: DISCONTINUED | OUTPATIENT
Start: 2021-01-06 | End: 2021-01-06 | Stop reason: HOSPADM

## 2021-01-06 RX ORDER — HEPARIN 100 UNIT/ML
500 SYRINGE INTRAVENOUS
Status: DISCONTINUED | OUTPATIENT
Start: 2021-01-06 | End: 2021-01-06 | Stop reason: HOSPADM

## 2021-01-06 RX ADMIN — HEPARIN 500 UNITS: 100 SYRINGE at 01:01

## 2021-01-06 RX ADMIN — DEXAMETHASONE SODIUM PHOSPHATE 20 MG: 10 INJECTION INTRAMUSCULAR; INTRAVENOUS at 10:01

## 2021-01-06 RX ADMIN — HYDROMORPHONE HYDROCHLORIDE 1 MG: 2 INJECTION, SOLUTION INTRAMUSCULAR; INTRAVENOUS; SUBCUTANEOUS at 11:01

## 2021-01-06 RX ADMIN — DOCETAXEL 145 MG: 20 INJECTION, SOLUTION, CONCENTRATE INTRAVENOUS at 12:01

## 2021-01-06 RX ADMIN — DIPHENHYDRAMINE HYDROCHLORIDE 50 MG: 50 INJECTION INTRAMUSCULAR; INTRAVENOUS at 10:01

## 2021-01-06 RX ADMIN — SODIUM CHLORIDE 752 MG: 0.9 INJECTION, SOLUTION INTRAVENOUS at 11:01

## 2021-01-06 RX ADMIN — Medication 10 ML: at 01:01

## 2021-01-10 PROBLEM — Z01.818 PREOP TESTING: Status: RESOLVED | Noted: 2020-05-07 | Resolved: 2021-01-10

## 2021-01-10 PROBLEM — C34.91 ADENOCARCINOMA OF LUNG, RIGHT: Status: RESOLVED | Noted: 2020-07-22 | Resolved: 2021-01-10

## 2021-01-24 DIAGNOSIS — M79.2 NEUROPATHIC PAIN OF SHOULDER, RIGHT: ICD-10-CM

## 2021-01-24 DIAGNOSIS — G89.3 CANCER RELATED PAIN: ICD-10-CM

## 2021-01-24 DIAGNOSIS — R52 PAIN MANAGEMENT: ICD-10-CM

## 2021-01-24 RX ORDER — GABAPENTIN 600 MG/1
600 TABLET ORAL 3 TIMES DAILY
Qty: 90 TABLET | Refills: 0 | Status: SHIPPED | OUTPATIENT
Start: 2021-01-24 | End: 2021-02-22 | Stop reason: SDUPTHER

## 2021-01-26 ENCOUNTER — NURSE TRIAGE (OUTPATIENT)
Dept: ADMINISTRATIVE | Facility: CLINIC | Age: 56
End: 2021-01-26

## 2021-01-26 ENCOUNTER — OFFICE VISIT (OUTPATIENT)
Dept: HEMATOLOGY/ONCOLOGY | Facility: CLINIC | Age: 56
End: 2021-01-26
Payer: MEDICAID

## 2021-01-26 VITALS
SYSTOLIC BLOOD PRESSURE: 146 MMHG | WEIGHT: 150.81 LBS | TEMPERATURE: 99 F | OXYGEN SATURATION: 98 % | DIASTOLIC BLOOD PRESSURE: 99 MMHG | HEART RATE: 110 BPM | BODY MASS INDEX: 22.86 KG/M2 | HEIGHT: 68 IN

## 2021-01-26 DIAGNOSIS — E87.6 HYPOKALEMIA: ICD-10-CM

## 2021-01-26 DIAGNOSIS — R05.9 COUGH: ICD-10-CM

## 2021-01-26 DIAGNOSIS — Z72.0 TOBACCO ABUSE: ICD-10-CM

## 2021-01-26 DIAGNOSIS — I10 ESSENTIAL HYPERTENSION: ICD-10-CM

## 2021-01-26 DIAGNOSIS — Z86.73 HISTORY OF CVA (CEREBROVASCULAR ACCIDENT): ICD-10-CM

## 2021-01-26 DIAGNOSIS — E78.5 DYSLIPIDEMIA: ICD-10-CM

## 2021-01-26 DIAGNOSIS — C34.91 ADENOCARCINOMA, LUNG, RIGHT: ICD-10-CM

## 2021-01-26 DIAGNOSIS — R06.02 SHORTNESS OF BREATH: ICD-10-CM

## 2021-01-26 DIAGNOSIS — E11.9 TYPE 2 DIABETES MELLITUS WITHOUT COMPLICATION, WITHOUT LONG-TERM CURRENT USE OF INSULIN: ICD-10-CM

## 2021-01-26 DIAGNOSIS — R63.0 ANOREXIA: Primary | ICD-10-CM

## 2021-01-26 PROCEDURE — 99999 PR PBB SHADOW E&M-EST. PATIENT-LVL V: ICD-10-PCS | Mod: PBBFAC,,, | Performed by: INTERNAL MEDICINE

## 2021-01-26 PROCEDURE — 99214 PR OFFICE/OUTPT VISIT, EST, LEVL IV, 30-39 MIN: ICD-10-PCS | Mod: S$PBB,,, | Performed by: INTERNAL MEDICINE

## 2021-01-26 PROCEDURE — 99215 OFFICE O/P EST HI 40 MIN: CPT | Mod: PBBFAC | Performed by: INTERNAL MEDICINE

## 2021-01-26 PROCEDURE — 99214 OFFICE O/P EST MOD 30 MIN: CPT | Mod: S$PBB,,, | Performed by: INTERNAL MEDICINE

## 2021-01-26 PROCEDURE — 99999 PR PBB SHADOW E&M-EST. PATIENT-LVL V: CPT | Mod: PBBFAC,,, | Performed by: INTERNAL MEDICINE

## 2021-01-26 RX ORDER — DRONABINOL 2.5 MG/1
2.5 CAPSULE ORAL
Qty: 60 CAPSULE | Refills: 3 | Status: SHIPPED | OUTPATIENT
Start: 2021-01-26 | End: 2021-02-01 | Stop reason: SDUPTHER

## 2021-01-27 ENCOUNTER — CLINICAL SUPPORT (OUTPATIENT)
Dept: URGENT CARE | Facility: CLINIC | Age: 56
End: 2021-01-27
Payer: MEDICAID

## 2021-01-27 ENCOUNTER — TELEPHONE (OUTPATIENT)
Dept: HEMATOLOGY/ONCOLOGY | Facility: CLINIC | Age: 56
End: 2021-01-27

## 2021-01-27 DIAGNOSIS — U07.1 COVID-19 VIRUS DETECTED: ICD-10-CM

## 2021-01-27 DIAGNOSIS — R52 PAIN MANAGEMENT: Primary | ICD-10-CM

## 2021-01-27 DIAGNOSIS — Z11.9 SCREENING EXAMINATION FOR INFECTIOUS DISEASE: Primary | ICD-10-CM

## 2021-01-27 LAB
CTP QC/QA: YES
SARS-COV-2 RDRP RESP QL NAA+PROBE: POSITIVE

## 2021-01-27 PROCEDURE — 87635: ICD-10-PCS | Mod: QW,S$GLB,, | Performed by: INTERNAL MEDICINE

## 2021-01-27 PROCEDURE — 87635 SARS-COV-2 COVID-19 AMP PRB: CPT | Mod: QW,S$GLB,, | Performed by: INTERNAL MEDICINE

## 2021-01-27 RX ORDER — MORPHINE SULFATE 100 MG/1
100 TABLET, FILM COATED, EXTENDED RELEASE ORAL EVERY 8 HOURS
Qty: 90 TABLET | Refills: 0 | Status: SHIPPED | OUTPATIENT
Start: 2021-01-27 | End: 2021-02-24

## 2021-02-01 DIAGNOSIS — R63.0 ANOREXIA: ICD-10-CM

## 2021-02-01 DIAGNOSIS — R52 PAIN MANAGEMENT: ICD-10-CM

## 2021-02-01 RX ORDER — DRONABINOL 2.5 MG/1
2.5 CAPSULE ORAL
Qty: 60 CAPSULE | Refills: 3 | Status: SHIPPED | OUTPATIENT
Start: 2021-02-01 | End: 2021-03-16 | Stop reason: ALTCHOICE

## 2021-02-01 RX ORDER — OXYCODONE HYDROCHLORIDE 30 MG/1
30 TABLET ORAL EVERY 4 HOURS PRN
Qty: 90 TABLET | Refills: 0
Start: 2021-02-01 | End: 2021-02-01 | Stop reason: SDUPTHER

## 2021-02-01 RX ORDER — OXYCODONE HYDROCHLORIDE 30 MG/1
30 TABLET ORAL EVERY 4 HOURS PRN
Qty: 90 TABLET | Refills: 0 | Status: SHIPPED | OUTPATIENT
Start: 2021-02-01 | End: 2021-02-24 | Stop reason: SDUPTHER

## 2021-02-08 DIAGNOSIS — C34.91 ADENOCARCINOMA, LUNG, RIGHT: Primary | ICD-10-CM

## 2021-02-17 ENCOUNTER — TELEPHONE (OUTPATIENT)
Dept: HEMATOLOGY/ONCOLOGY | Facility: CLINIC | Age: 56
End: 2021-02-17

## 2021-02-17 DIAGNOSIS — R52 PAIN MANAGEMENT: ICD-10-CM

## 2021-02-17 DIAGNOSIS — C34.91 ADENOCARCINOMA, LUNG, RIGHT: Primary | ICD-10-CM

## 2021-02-17 RX ORDER — FOLIC ACID 1 MG/1
1 TABLET ORAL DAILY
Qty: 30 TABLET | Refills: 6 | Status: SHIPPED | OUTPATIENT
Start: 2021-02-17 | End: 2022-02-17

## 2021-02-17 RX ORDER — FENTANYL 100 UG/H
1 PATCH TRANSDERMAL
Qty: 10 PATCH | Refills: 0 | Status: SHIPPED | OUTPATIENT
Start: 2021-02-17 | End: 2021-02-24

## 2021-02-22 ENCOUNTER — TELEPHONE (OUTPATIENT)
Dept: HEMATOLOGY/ONCOLOGY | Facility: CLINIC | Age: 56
End: 2021-02-22

## 2021-02-22 DIAGNOSIS — R52 PAIN MANAGEMENT: ICD-10-CM

## 2021-02-22 DIAGNOSIS — M79.2 NEUROPATHIC PAIN OF SHOULDER, RIGHT: ICD-10-CM

## 2021-02-22 DIAGNOSIS — G89.3 CANCER RELATED PAIN: ICD-10-CM

## 2021-02-22 RX ORDER — GABAPENTIN 600 MG/1
600 TABLET ORAL 3 TIMES DAILY
Qty: 90 TABLET | Refills: 0 | Status: SHIPPED | OUTPATIENT
Start: 2021-02-22 | End: 2021-02-24 | Stop reason: SDUPTHER

## 2021-02-23 ENCOUNTER — LAB VISIT (OUTPATIENT)
Dept: LAB | Facility: HOSPITAL | Age: 56
End: 2021-02-23
Attending: INTERNAL MEDICINE
Payer: MEDICAID

## 2021-02-23 DIAGNOSIS — C34.91 ADENOCARCINOMA, LUNG, RIGHT: ICD-10-CM

## 2021-02-23 LAB
BILIRUB UR QL STRIP: NEGATIVE
CLARITY UR: CLEAR
COLOR UR: YELLOW
GLUCOSE UR QL STRIP: NEGATIVE
HGB UR QL STRIP: NEGATIVE
KETONES UR QL STRIP: NEGATIVE
LEUKOCYTE ESTERASE UR QL STRIP: NEGATIVE
NITRITE UR QL STRIP: NEGATIVE
PH UR STRIP: 5 [PH] (ref 5–8)
PROT UR QL STRIP: NEGATIVE
SP GR UR STRIP: 1.01 (ref 1–1.03)
URN SPEC COLLECT METH UR: NORMAL
UROBILINOGEN UR STRIP-ACNC: NEGATIVE EU/DL

## 2021-02-23 PROCEDURE — 81003 URINALYSIS AUTO W/O SCOPE: CPT

## 2021-02-24 ENCOUNTER — OFFICE VISIT (OUTPATIENT)
Dept: HEMATOLOGY/ONCOLOGY | Facility: CLINIC | Age: 56
End: 2021-02-24
Payer: MEDICAID

## 2021-02-24 ENCOUNTER — HOSPITAL ENCOUNTER (OUTPATIENT)
Dept: RADIOLOGY | Facility: HOSPITAL | Age: 56
Discharge: HOME | End: 2021-02-24
Attending: INTERNAL MEDICINE
Payer: MEDICAID

## 2021-02-24 ENCOUNTER — OFFICE VISIT (OUTPATIENT)
Dept: PALLIATIVE MEDICINE | Facility: CLINIC | Age: 56
End: 2021-02-24
Payer: MEDICAID

## 2021-02-24 VITALS
HEART RATE: 137 BPM | HEIGHT: 68 IN | OXYGEN SATURATION: 94 % | WEIGHT: 151.69 LBS | SYSTOLIC BLOOD PRESSURE: 135 MMHG | DIASTOLIC BLOOD PRESSURE: 89 MMHG | TEMPERATURE: 99 F | BODY MASS INDEX: 22.99 KG/M2

## 2021-02-24 DIAGNOSIS — Z86.73 HISTORY OF CVA (CEREBROVASCULAR ACCIDENT): ICD-10-CM

## 2021-02-24 DIAGNOSIS — C34.91 ADENOCARCINOMA, LUNG, RIGHT: ICD-10-CM

## 2021-02-24 DIAGNOSIS — C34.91 ADENOCARCINOMA, LUNG, RIGHT: Primary | ICD-10-CM

## 2021-02-24 DIAGNOSIS — Z72.0 TOBACCO ABUSE: ICD-10-CM

## 2021-02-24 DIAGNOSIS — E78.5 DYSLIPIDEMIA: ICD-10-CM

## 2021-02-24 DIAGNOSIS — G89.3 CANCER RELATED PAIN: ICD-10-CM

## 2021-02-24 DIAGNOSIS — R52 PAIN MANAGEMENT: ICD-10-CM

## 2021-02-24 DIAGNOSIS — E11.9 TYPE 2 DIABETES MELLITUS WITHOUT COMPLICATION, WITHOUT LONG-TERM CURRENT USE OF INSULIN: ICD-10-CM

## 2021-02-24 DIAGNOSIS — E87.6 HYPOKALEMIA: ICD-10-CM

## 2021-02-24 DIAGNOSIS — M79.2 NEUROPATHIC PAIN OF SHOULDER, RIGHT: ICD-10-CM

## 2021-02-24 DIAGNOSIS — I10 ESSENTIAL HYPERTENSION: ICD-10-CM

## 2021-02-24 PROCEDURE — 99211 OFF/OP EST MAY X REQ PHY/QHP: CPT | Mod: PBBFAC,27 | Performed by: NURSE PRACTITIONER

## 2021-02-24 PROCEDURE — 99999 PR PBB SHADOW E&M-EST. PATIENT-LVL I: ICD-10-PCS | Mod: PBBFAC,,, | Performed by: NURSE PRACTITIONER

## 2021-02-24 PROCEDURE — 74177 CT ABD & PELVIS W/CONTRAST: CPT | Mod: TC

## 2021-02-24 PROCEDURE — 71260 CT THORAX DX C+: CPT | Mod: 26,,, | Performed by: INTERNAL MEDICINE

## 2021-02-24 PROCEDURE — 99214 OFFICE O/P EST MOD 30 MIN: CPT | Mod: PBBFAC | Performed by: INTERNAL MEDICINE

## 2021-02-24 PROCEDURE — 71260 CT CHEST ABDOMEN PELVIS WITH CONTRAST (XPD): ICD-10-PCS | Mod: 26,,, | Performed by: INTERNAL MEDICINE

## 2021-02-24 PROCEDURE — 99999 PR PBB SHADOW E&M-EST. PATIENT-LVL I: CPT | Mod: PBBFAC,,, | Performed by: NURSE PRACTITIONER

## 2021-02-24 PROCEDURE — 99999 PR PBB SHADOW E&M-EST. PATIENT-LVL IV: CPT | Mod: PBBFAC,,, | Performed by: INTERNAL MEDICINE

## 2021-02-24 PROCEDURE — 99215 OFFICE O/P EST HI 40 MIN: CPT | Mod: S$PBB,,, | Performed by: NURSE PRACTITIONER

## 2021-02-24 PROCEDURE — 99999 PR PBB SHADOW E&M-EST. PATIENT-LVL IV: ICD-10-PCS | Mod: PBBFAC,,, | Performed by: INTERNAL MEDICINE

## 2021-02-24 PROCEDURE — 71260 CT THORAX DX C+: CPT | Mod: TC

## 2021-02-24 PROCEDURE — 99214 OFFICE O/P EST MOD 30 MIN: CPT | Mod: S$PBB,,, | Performed by: INTERNAL MEDICINE

## 2021-02-24 PROCEDURE — 74177 CT CHEST ABDOMEN PELVIS WITH CONTRAST (XPD): ICD-10-PCS | Mod: 26,,, | Performed by: INTERNAL MEDICINE

## 2021-02-24 PROCEDURE — 99214 PR OFFICE/OUTPT VISIT, EST, LEVL IV, 30-39 MIN: ICD-10-PCS | Mod: S$PBB,,, | Performed by: INTERNAL MEDICINE

## 2021-02-24 PROCEDURE — 74177 CT ABD & PELVIS W/CONTRAST: CPT | Mod: 26,,, | Performed by: INTERNAL MEDICINE

## 2021-02-24 PROCEDURE — 99215 PR OFFICE/OUTPT VISIT, EST, LEVL V, 40-54 MIN: ICD-10-PCS | Mod: S$PBB,,, | Performed by: NURSE PRACTITIONER

## 2021-02-24 PROCEDURE — 25500020 PHARM REV CODE 255: Performed by: INTERNAL MEDICINE

## 2021-02-24 RX ORDER — OXYCODONE HYDROCHLORIDE 30 MG/1
30 TABLET ORAL EVERY 4 HOURS PRN
Qty: 90 TABLET | Refills: 0 | Status: SHIPPED | OUTPATIENT
Start: 2021-02-24 | End: 2021-03-16 | Stop reason: SDUPTHER

## 2021-02-24 RX ORDER — GABAPENTIN 600 MG/1
600 TABLET ORAL 3 TIMES DAILY
Qty: 90 TABLET | Refills: 0 | Status: SHIPPED | OUTPATIENT
Start: 2021-02-24 | End: 2021-03-29 | Stop reason: SDUPTHER

## 2021-02-24 RX ADMIN — IOHEXOL 15 ML: 300 INJECTION, SOLUTION INTRAVENOUS at 02:02

## 2021-02-24 RX ADMIN — IOHEXOL 75 ML: 350 INJECTION, SOLUTION INTRAVENOUS at 02:02

## 2021-02-25 ENCOUNTER — TELEPHONE (OUTPATIENT)
Dept: HEMATOLOGY/ONCOLOGY | Facility: CLINIC | Age: 56
End: 2021-02-25

## 2021-02-25 ENCOUNTER — INFUSION (OUTPATIENT)
Dept: INFUSION THERAPY | Facility: HOSPITAL | Age: 56
End: 2021-02-25
Attending: INTERNAL MEDICINE
Payer: MEDICAID

## 2021-02-25 VITALS
TEMPERATURE: 99 F | OXYGEN SATURATION: 96 % | SYSTOLIC BLOOD PRESSURE: 113 MMHG | HEART RATE: 118 BPM | RESPIRATION RATE: 16 BRPM | DIASTOLIC BLOOD PRESSURE: 72 MMHG

## 2021-02-25 DIAGNOSIS — C34.91 ADENOCARCINOMA, LUNG, RIGHT: Primary | ICD-10-CM

## 2021-02-25 PROCEDURE — 96413 CHEMO IV INFUSION 1 HR: CPT

## 2021-02-25 PROCEDURE — 96367 TX/PROPH/DG ADDL SEQ IV INF: CPT

## 2021-02-25 PROCEDURE — 96417 CHEMO IV INFUS EACH ADDL SEQ: CPT

## 2021-02-25 PROCEDURE — 25000003 PHARM REV CODE 250: Performed by: INTERNAL MEDICINE

## 2021-02-25 PROCEDURE — 63600175 PHARM REV CODE 636 W HCPCS: Performed by: INTERNAL MEDICINE

## 2021-02-25 PROCEDURE — A4216 STERILE WATER/SALINE, 10 ML: HCPCS | Performed by: INTERNAL MEDICINE

## 2021-02-25 RX ORDER — HEPARIN 100 UNIT/ML
500 SYRINGE INTRAVENOUS
Status: DISCONTINUED | OUTPATIENT
Start: 2021-02-25 | End: 2021-02-25 | Stop reason: HOSPADM

## 2021-02-25 RX ORDER — SODIUM CHLORIDE 0.9 % (FLUSH) 0.9 %
10 SYRINGE (ML) INJECTION
Status: CANCELLED | OUTPATIENT
Start: 2021-02-25

## 2021-02-25 RX ORDER — SODIUM CHLORIDE 0.9 % (FLUSH) 0.9 %
10 SYRINGE (ML) INJECTION
Status: DISCONTINUED | OUTPATIENT
Start: 2021-02-25 | End: 2021-02-25 | Stop reason: HOSPADM

## 2021-02-25 RX ORDER — HEPARIN 100 UNIT/ML
500 SYRINGE INTRAVENOUS
Status: CANCELLED | OUTPATIENT
Start: 2021-02-25

## 2021-02-25 RX ADMIN — HEPARIN 500 UNITS: 100 SYRINGE at 04:02

## 2021-02-25 RX ADMIN — DOCETAXEL 135 MG: 20 INJECTION, SOLUTION, CONCENTRATE INTRAVENOUS at 03:02

## 2021-02-25 RX ADMIN — DIPHENHYDRAMINE HYDROCHLORIDE 50 MG: 50 INJECTION INTRAMUSCULAR; INTRAVENOUS at 02:02

## 2021-02-25 RX ADMIN — SODIUM CHLORIDE 688 MG: 0.9 INJECTION, SOLUTION INTRAVENOUS at 02:02

## 2021-02-25 RX ADMIN — Medication 10 ML: at 04:02

## 2021-02-25 RX ADMIN — DEXAMETHASONE SODIUM PHOSPHATE 20 MG: 10 INJECTION, SOLUTION INTRAMUSCULAR; INTRAVENOUS at 01:02

## 2021-03-01 PROBLEM — Z09 CHEMOTHERAPY FOLLOW-UP EXAMINATION: Status: RESOLVED | Noted: 2020-11-27 | Resolved: 2021-03-01

## 2021-03-12 DIAGNOSIS — C80.1 ANXIETY ASSOCIATED WITH CANCER DIAGNOSIS: ICD-10-CM

## 2021-03-12 DIAGNOSIS — F41.1 ANXIETY ASSOCIATED WITH CANCER DIAGNOSIS: ICD-10-CM

## 2021-03-12 RX ORDER — ALPRAZOLAM 0.5 MG/1
0.5 TABLET ORAL 3 TIMES DAILY PRN
Qty: 30 TABLET | Refills: 0 | Status: SHIPPED | OUTPATIENT
Start: 2021-03-12 | End: 2022-03-12

## 2021-03-16 DIAGNOSIS — R52 PAIN MANAGEMENT: ICD-10-CM

## 2021-03-16 RX ORDER — CYPROHEPTADINE HYDROCHLORIDE 4 MG/1
4 TABLET ORAL 3 TIMES DAILY PRN
Qty: 90 TABLET | Refills: 0 | Status: SHIPPED | OUTPATIENT
Start: 2021-03-16

## 2021-03-16 RX ORDER — OXYCODONE HYDROCHLORIDE 30 MG/1
30 TABLET ORAL EVERY 4 HOURS PRN
Qty: 90 TABLET | Refills: 0 | Status: SHIPPED | OUTPATIENT
Start: 2021-03-16 | End: 2021-03-16 | Stop reason: SDUPTHER

## 2021-03-16 RX ORDER — OXYCODONE HYDROCHLORIDE 30 MG/1
30 TABLET ORAL EVERY 4 HOURS PRN
Qty: 90 TABLET | Refills: 0 | Status: SHIPPED | OUTPATIENT
Start: 2021-03-16 | End: 2021-03-17 | Stop reason: SDUPTHER

## 2021-03-17 ENCOUNTER — OFFICE VISIT (OUTPATIENT)
Dept: HEMATOLOGY/ONCOLOGY | Facility: CLINIC | Age: 56
End: 2021-03-17
Payer: MEDICAID

## 2021-03-17 ENCOUNTER — DOCUMENTATION ONLY (OUTPATIENT)
Dept: HEMATOLOGY/ONCOLOGY | Facility: CLINIC | Age: 56
End: 2021-03-17

## 2021-03-17 ENCOUNTER — LAB VISIT (OUTPATIENT)
Dept: LAB | Facility: HOSPITAL | Age: 56
End: 2021-03-17
Attending: INTERNAL MEDICINE
Payer: MEDICAID

## 2021-03-17 VITALS
SYSTOLIC BLOOD PRESSURE: 117 MMHG | DIASTOLIC BLOOD PRESSURE: 83 MMHG | HEART RATE: 128 BPM | TEMPERATURE: 98 F | BODY MASS INDEX: 22.19 KG/M2 | HEIGHT: 68 IN | WEIGHT: 146.38 LBS | OXYGEN SATURATION: 99 %

## 2021-03-17 DIAGNOSIS — C34.91 ADENOCARCINOMA, LUNG, RIGHT: Primary | ICD-10-CM

## 2021-03-17 DIAGNOSIS — E11.9 TYPE 2 DIABETES MELLITUS WITHOUT COMPLICATION, WITHOUT LONG-TERM CURRENT USE OF INSULIN: ICD-10-CM

## 2021-03-17 DIAGNOSIS — I10 ESSENTIAL HYPERTENSION: ICD-10-CM

## 2021-03-17 DIAGNOSIS — R63.0 ANOREXIA: ICD-10-CM

## 2021-03-17 DIAGNOSIS — E87.6 HYPOKALEMIA: ICD-10-CM

## 2021-03-17 DIAGNOSIS — R52 PAIN MANAGEMENT: ICD-10-CM

## 2021-03-17 DIAGNOSIS — Z72.0 TOBACCO ABUSE: ICD-10-CM

## 2021-03-17 DIAGNOSIS — E78.5 DYSLIPIDEMIA: ICD-10-CM

## 2021-03-17 DIAGNOSIS — Z86.73 HISTORY OF CVA (CEREBROVASCULAR ACCIDENT): ICD-10-CM

## 2021-03-17 DIAGNOSIS — G89.3 CANCER RELATED PAIN: ICD-10-CM

## 2021-03-17 DIAGNOSIS — C34.91 ADENOCARCINOMA, LUNG, RIGHT: ICD-10-CM

## 2021-03-17 LAB
ALBUMIN SERPL BCP-MCNC: 2.3 G/DL (ref 3.5–5.2)
ALP SERPL-CCNC: 100 U/L (ref 55–135)
ALT SERPL W/O P-5'-P-CCNC: 5 U/L (ref 10–44)
ANION GAP SERPL CALC-SCNC: 11 MMOL/L (ref 8–16)
AST SERPL-CCNC: 13 U/L (ref 10–40)
BACTERIA #/AREA URNS HPF: ABNORMAL /HPF
BASOPHILS # BLD AUTO: 0.03 K/UL (ref 0–0.2)
BASOPHILS NFR BLD: 0.3 % (ref 0–1.9)
BILIRUB SERPL-MCNC: 0.4 MG/DL (ref 0.1–1)
BILIRUB UR QL STRIP: NEGATIVE
BUN SERPL-MCNC: 6 MG/DL (ref 6–20)
CALCIUM SERPL-MCNC: 9.5 MG/DL (ref 8.7–10.5)
CHLORIDE SERPL-SCNC: 98 MMOL/L (ref 95–110)
CLARITY UR: CLEAR
CO2 SERPL-SCNC: 29 MMOL/L (ref 23–29)
COLOR UR: YELLOW
CREAT SERPL-MCNC: 0.7 MG/DL (ref 0.5–1.4)
DIFFERENTIAL METHOD: ABNORMAL
EOSINOPHIL # BLD AUTO: 0 K/UL (ref 0–0.5)
EOSINOPHIL NFR BLD: 0.1 % (ref 0–8)
ERYTHROCYTE [DISTWIDTH] IN BLOOD BY AUTOMATED COUNT: 18.2 % (ref 11.5–14.5)
EST. GFR  (AFRICAN AMERICAN): >60 ML/MIN/1.73 M^2
EST. GFR  (NON AFRICAN AMERICAN): >60 ML/MIN/1.73 M^2
GLUCOSE SERPL-MCNC: 139 MG/DL (ref 70–110)
GLUCOSE UR QL STRIP: NEGATIVE
HCT VFR BLD AUTO: 38.1 % (ref 40–54)
HGB BLD-MCNC: 11.5 G/DL (ref 14–18)
HGB UR QL STRIP: NEGATIVE
HYALINE CASTS #/AREA URNS LPF: 3 /LPF
IMM GRANULOCYTES # BLD AUTO: 0.03 K/UL (ref 0–0.04)
IMM GRANULOCYTES NFR BLD AUTO: 0.3 % (ref 0–0.5)
KETONES UR QL STRIP: NEGATIVE
LEUKOCYTE ESTERASE UR QL STRIP: ABNORMAL
LYMPHOCYTES # BLD AUTO: 2.7 K/UL (ref 1–4.8)
LYMPHOCYTES NFR BLD: 24.3 % (ref 18–48)
MCH RBC QN AUTO: 24 PG (ref 27–31)
MCHC RBC AUTO-ENTMCNC: 30.2 G/DL (ref 32–36)
MCV RBC AUTO: 80 FL (ref 82–98)
MICROSCOPIC COMMENT: ABNORMAL
MONOCYTES # BLD AUTO: 1.2 K/UL (ref 0.3–1)
MONOCYTES NFR BLD: 10.8 % (ref 4–15)
NEUTROPHILS # BLD AUTO: 7.2 K/UL (ref 1.8–7.7)
NEUTROPHILS NFR BLD: 64.2 % (ref 38–73)
NITRITE UR QL STRIP: NEGATIVE
NRBC BLD-RTO: 0 /100 WBC
PH UR STRIP: 6 [PH] (ref 5–8)
PLATELET # BLD AUTO: 528 K/UL (ref 150–350)
PMV BLD AUTO: 8.8 FL (ref 9.2–12.9)
POTASSIUM SERPL-SCNC: 4.1 MMOL/L (ref 3.5–5.1)
PROT SERPL-MCNC: 7.1 G/DL (ref 6–8.4)
PROT UR QL STRIP: ABNORMAL
RBC # BLD AUTO: 4.79 M/UL (ref 4.6–6.2)
RBC #/AREA URNS HPF: 1 /HPF (ref 0–4)
SODIUM SERPL-SCNC: 138 MMOL/L (ref 136–145)
SP GR UR STRIP: 1.02 (ref 1–1.03)
SQUAMOUS #/AREA URNS HPF: 4 /HPF
URN SPEC COLLECT METH UR: ABNORMAL
UROBILINOGEN UR STRIP-ACNC: ABNORMAL EU/DL
WBC # BLD AUTO: 11.16 K/UL (ref 3.9–12.7)
WBC #/AREA URNS HPF: 5 /HPF (ref 0–5)

## 2021-03-17 PROCEDURE — 99999 PR PBB SHADOW E&M-EST. PATIENT-LVL V: CPT | Mod: PBBFAC,,, | Performed by: INTERNAL MEDICINE

## 2021-03-17 PROCEDURE — 81000 URINALYSIS NONAUTO W/SCOPE: CPT | Performed by: INTERNAL MEDICINE

## 2021-03-17 PROCEDURE — 99215 OFFICE O/P EST HI 40 MIN: CPT | Mod: PBBFAC | Performed by: INTERNAL MEDICINE

## 2021-03-17 PROCEDURE — 36415 COLL VENOUS BLD VENIPUNCTURE: CPT | Performed by: INTERNAL MEDICINE

## 2021-03-17 PROCEDURE — 99214 PR OFFICE/OUTPT VISIT, EST, LEVL IV, 30-39 MIN: ICD-10-PCS | Mod: S$PBB,,, | Performed by: INTERNAL MEDICINE

## 2021-03-17 PROCEDURE — 99999 PR PBB SHADOW E&M-EST. PATIENT-LVL V: ICD-10-PCS | Mod: PBBFAC,,, | Performed by: INTERNAL MEDICINE

## 2021-03-17 PROCEDURE — 80053 COMPREHEN METABOLIC PANEL: CPT | Performed by: INTERNAL MEDICINE

## 2021-03-17 PROCEDURE — 85025 COMPLETE CBC W/AUTO DIFF WBC: CPT | Performed by: INTERNAL MEDICINE

## 2021-03-17 PROCEDURE — 99214 OFFICE O/P EST MOD 30 MIN: CPT | Mod: S$PBB,,, | Performed by: INTERNAL MEDICINE

## 2021-03-17 RX ORDER — OXYCODONE HYDROCHLORIDE 15 MG/1
30 TABLET ORAL EVERY 4 HOURS PRN
Qty: 90 TABLET | Refills: 0 | Status: SHIPPED | OUTPATIENT
Start: 2021-03-17 | End: 2021-04-07

## 2021-03-17 RX ORDER — SODIUM CHLORIDE 0.9 % (FLUSH) 0.9 %
10 SYRINGE (ML) INJECTION
Status: CANCELLED | OUTPATIENT
Start: 2021-03-18

## 2021-03-17 RX ORDER — OXYCODONE HYDROCHLORIDE 30 MG/1
30 TABLET ORAL EVERY 4 HOURS PRN
Qty: 90 TABLET | Refills: 0 | Status: SHIPPED | OUTPATIENT
Start: 2021-03-17 | End: 2021-04-07 | Stop reason: SDUPTHER

## 2021-03-17 RX ORDER — HEPARIN 100 UNIT/ML
500 SYRINGE INTRAVENOUS
Status: CANCELLED | OUTPATIENT
Start: 2021-03-18

## 2021-03-18 ENCOUNTER — NURSE TRIAGE (OUTPATIENT)
Dept: ADMINISTRATIVE | Facility: CLINIC | Age: 56
End: 2021-03-18

## 2021-03-18 ENCOUNTER — INFUSION (OUTPATIENT)
Dept: INFUSION THERAPY | Facility: HOSPITAL | Age: 56
End: 2021-03-18
Attending: INTERNAL MEDICINE
Payer: MEDICAID

## 2021-03-18 ENCOUNTER — TELEPHONE (OUTPATIENT)
Dept: HEMATOLOGY/ONCOLOGY | Facility: CLINIC | Age: 56
End: 2021-03-18

## 2021-03-18 VITALS
SYSTOLIC BLOOD PRESSURE: 143 MMHG | HEART RATE: 91 BPM | RESPIRATION RATE: 16 BRPM | TEMPERATURE: 98 F | OXYGEN SATURATION: 97 % | DIASTOLIC BLOOD PRESSURE: 86 MMHG

## 2021-03-18 DIAGNOSIS — C34.91 ADENOCARCINOMA, LUNG, RIGHT: Primary | ICD-10-CM

## 2021-03-18 PROCEDURE — A4216 STERILE WATER/SALINE, 10 ML: HCPCS | Performed by: INTERNAL MEDICINE

## 2021-03-18 PROCEDURE — 96417 CHEMO IV INFUS EACH ADDL SEQ: CPT

## 2021-03-18 PROCEDURE — 96413 CHEMO IV INFUSION 1 HR: CPT

## 2021-03-18 PROCEDURE — 63600175 PHARM REV CODE 636 W HCPCS: Mod: JG | Performed by: INTERNAL MEDICINE

## 2021-03-18 PROCEDURE — 96367 TX/PROPH/DG ADDL SEQ IV INF: CPT

## 2021-03-18 PROCEDURE — 25000003 PHARM REV CODE 250: Performed by: INTERNAL MEDICINE

## 2021-03-18 RX ORDER — HEPARIN 100 UNIT/ML
500 SYRINGE INTRAVENOUS
Status: DISCONTINUED | OUTPATIENT
Start: 2021-03-18 | End: 2021-03-18 | Stop reason: HOSPADM

## 2021-03-18 RX ORDER — SODIUM CHLORIDE 0.9 % (FLUSH) 0.9 %
10 SYRINGE (ML) INJECTION
Status: DISCONTINUED | OUTPATIENT
Start: 2021-03-18 | End: 2021-03-18 | Stop reason: HOSPADM

## 2021-03-18 RX ADMIN — DOCETAXEL 135 MG: 20 INJECTION, SOLUTION, CONCENTRATE INTRAVENOUS at 02:03

## 2021-03-18 RX ADMIN — HEPARIN SODIUM (PORCINE) LOCK FLUSH IV SOLN 100 UNIT/ML 500 UNITS: 100 SOLUTION at 04:03

## 2021-03-18 RX ADMIN — DIPHENHYDRAMINE HYDROCHLORIDE 50 MG: 50 INJECTION INTRAMUSCULAR; INTRAVENOUS at 12:03

## 2021-03-18 RX ADMIN — DEXAMETHASONE SODIUM PHOSPHATE 20 MG: 10 INJECTION, SOLUTION INTRAMUSCULAR; INTRAVENOUS at 01:03

## 2021-03-18 RX ADMIN — SODIUM CHLORIDE 664 MG: 0.9 INJECTION, SOLUTION INTRAVENOUS at 01:03

## 2021-03-18 RX ADMIN — Medication 10 ML: at 04:03

## 2021-03-19 ENCOUNTER — PATIENT MESSAGE (OUTPATIENT)
Dept: HEMATOLOGY/ONCOLOGY | Facility: CLINIC | Age: 56
End: 2021-03-19

## 2021-03-19 RX ORDER — MORPHINE SULFATE 100 MG/1
TABLET, FILM COATED, EXTENDED RELEASE ORAL 3 TIMES DAILY
Qty: 90 TABLET | Refills: 0 | Status: SHIPPED | OUTPATIENT
Start: 2021-03-19 | End: 2021-04-07

## 2021-03-29 DIAGNOSIS — R52 PAIN MANAGEMENT: ICD-10-CM

## 2021-03-29 DIAGNOSIS — G89.3 CANCER RELATED PAIN: ICD-10-CM

## 2021-03-29 DIAGNOSIS — M79.2 NEUROPATHIC PAIN OF SHOULDER, RIGHT: ICD-10-CM

## 2021-03-29 RX ORDER — GABAPENTIN 600 MG/1
600 TABLET ORAL 3 TIMES DAILY
Qty: 90 TABLET | Refills: 0 | Status: SHIPPED | OUTPATIENT
Start: 2021-03-29 | End: 2021-03-29 | Stop reason: SDUPTHER

## 2021-03-29 RX ORDER — GABAPENTIN 600 MG/1
600 TABLET ORAL 3 TIMES DAILY
Qty: 90 TABLET | Refills: 0 | Status: SHIPPED | OUTPATIENT
Start: 2021-03-29 | End: 2021-05-01 | Stop reason: SDUPTHER

## 2021-04-07 ENCOUNTER — OFFICE VISIT (OUTPATIENT)
Dept: HEMATOLOGY/ONCOLOGY | Facility: CLINIC | Age: 56
End: 2021-04-07
Payer: MEDICAID

## 2021-04-07 ENCOUNTER — OFFICE VISIT (OUTPATIENT)
Dept: PALLIATIVE MEDICINE | Facility: CLINIC | Age: 56
End: 2021-04-07
Payer: MEDICAID

## 2021-04-07 ENCOUNTER — LAB VISIT (OUTPATIENT)
Dept: LAB | Facility: HOSPITAL | Age: 56
End: 2021-04-07
Attending: INTERNAL MEDICINE
Payer: MEDICAID

## 2021-04-07 VITALS
DIASTOLIC BLOOD PRESSURE: 69 MMHG | OXYGEN SATURATION: 95 % | SYSTOLIC BLOOD PRESSURE: 112 MMHG | HEIGHT: 68 IN | BODY MASS INDEX: 21.72 KG/M2 | HEART RATE: 96 BPM | TEMPERATURE: 98 F | WEIGHT: 143.31 LBS

## 2021-04-07 DIAGNOSIS — K59.03 THERAPEUTIC OPIOID INDUCED CONSTIPATION: ICD-10-CM

## 2021-04-07 DIAGNOSIS — R52 PAIN MANAGEMENT: ICD-10-CM

## 2021-04-07 DIAGNOSIS — C34.91 ADENOCARCINOMA, LUNG, RIGHT: Primary | ICD-10-CM

## 2021-04-07 DIAGNOSIS — I10 ESSENTIAL HYPERTENSION: ICD-10-CM

## 2021-04-07 DIAGNOSIS — T40.2X5A THERAPEUTIC OPIOID INDUCED CONSTIPATION: ICD-10-CM

## 2021-04-07 DIAGNOSIS — G89.3 CANCER RELATED PAIN: ICD-10-CM

## 2021-04-07 DIAGNOSIS — G47.00 INSOMNIA, UNSPECIFIED TYPE: ICD-10-CM

## 2021-04-07 DIAGNOSIS — E78.5 DYSLIPIDEMIA: ICD-10-CM

## 2021-04-07 DIAGNOSIS — R63.0 ANOREXIA: ICD-10-CM

## 2021-04-07 DIAGNOSIS — R06.2 WHEEZING: ICD-10-CM

## 2021-04-07 DIAGNOSIS — K21.9 GASTROESOPHAGEAL REFLUX DISEASE WITHOUT ESOPHAGITIS: ICD-10-CM

## 2021-04-07 DIAGNOSIS — K59.03 CONSTIPATION DUE TO OPIOID THERAPY: ICD-10-CM

## 2021-04-07 DIAGNOSIS — E87.6 HYPOKALEMIA: ICD-10-CM

## 2021-04-07 DIAGNOSIS — R11.0 NAUSEA: ICD-10-CM

## 2021-04-07 DIAGNOSIS — R05.9 COUGH: ICD-10-CM

## 2021-04-07 DIAGNOSIS — Z86.73 HISTORY OF CVA (CEREBROVASCULAR ACCIDENT): ICD-10-CM

## 2021-04-07 DIAGNOSIS — E11.9 TYPE 2 DIABETES MELLITUS WITHOUT COMPLICATION, WITHOUT LONG-TERM CURRENT USE OF INSULIN: ICD-10-CM

## 2021-04-07 DIAGNOSIS — C34.91 ADENOCARCINOMA, LUNG, RIGHT: ICD-10-CM

## 2021-04-07 DIAGNOSIS — T40.2X5A CONSTIPATION DUE TO OPIOID THERAPY: ICD-10-CM

## 2021-04-07 DIAGNOSIS — Z72.0 TOBACCO ABUSE: ICD-10-CM

## 2021-04-07 LAB
ALBUMIN SERPL BCP-MCNC: 2.8 G/DL (ref 3.5–5.2)
ALP SERPL-CCNC: 83 U/L (ref 55–135)
ALT SERPL W/O P-5'-P-CCNC: 8 U/L (ref 10–44)
ANION GAP SERPL CALC-SCNC: 10 MMOL/L (ref 8–16)
AST SERPL-CCNC: 16 U/L (ref 10–40)
BASOPHILS # BLD AUTO: 0.03 K/UL (ref 0–0.2)
BASOPHILS NFR BLD: 0.2 % (ref 0–1.9)
BILIRUB SERPL-MCNC: 0.2 MG/DL (ref 0.1–1)
BUN SERPL-MCNC: 12 MG/DL (ref 6–20)
CALCIUM SERPL-MCNC: 9.3 MG/DL (ref 8.7–10.5)
CHLORIDE SERPL-SCNC: 101 MMOL/L (ref 95–110)
CO2 SERPL-SCNC: 28 MMOL/L (ref 23–29)
CREAT SERPL-MCNC: 0.8 MG/DL (ref 0.5–1.4)
DIFFERENTIAL METHOD: ABNORMAL
EOSINOPHIL # BLD AUTO: 0 K/UL (ref 0–0.5)
EOSINOPHIL NFR BLD: 0.1 % (ref 0–8)
ERYTHROCYTE [DISTWIDTH] IN BLOOD BY AUTOMATED COUNT: 19.1 % (ref 11.5–14.5)
EST. GFR  (AFRICAN AMERICAN): >60 ML/MIN/1.73 M^2
EST. GFR  (NON AFRICAN AMERICAN): >60 ML/MIN/1.73 M^2
GLUCOSE SERPL-MCNC: 117 MG/DL (ref 70–110)
HCT VFR BLD AUTO: 36.4 % (ref 40–54)
HGB BLD-MCNC: 11 G/DL (ref 14–18)
IMM GRANULOCYTES # BLD AUTO: 0.05 K/UL (ref 0–0.04)
IMM GRANULOCYTES NFR BLD AUTO: 0.4 % (ref 0–0.5)
LYMPHOCYTES # BLD AUTO: 1.7 K/UL (ref 1–4.8)
LYMPHOCYTES NFR BLD: 13.3 % (ref 18–48)
MCH RBC QN AUTO: 23.5 PG (ref 27–31)
MCHC RBC AUTO-ENTMCNC: 30.2 G/DL (ref 32–36)
MCV RBC AUTO: 78 FL (ref 82–98)
MONOCYTES # BLD AUTO: 0.5 K/UL (ref 0.3–1)
MONOCYTES NFR BLD: 3.5 % (ref 4–15)
NEUTROPHILS # BLD AUTO: 10.6 K/UL (ref 1.8–7.7)
NEUTROPHILS NFR BLD: 82.5 % (ref 38–73)
NRBC BLD-RTO: 0 /100 WBC
PLATELET # BLD AUTO: 414 K/UL (ref 150–450)
PMV BLD AUTO: 9 FL (ref 9.2–12.9)
POTASSIUM SERPL-SCNC: 3.9 MMOL/L (ref 3.5–5.1)
PROT SERPL-MCNC: 7.2 G/DL (ref 6–8.4)
RBC # BLD AUTO: 4.69 M/UL (ref 4.6–6.2)
SODIUM SERPL-SCNC: 139 MMOL/L (ref 136–145)
WBC # BLD AUTO: 12.84 K/UL (ref 3.9–12.7)

## 2021-04-07 PROCEDURE — 36415 COLL VENOUS BLD VENIPUNCTURE: CPT | Performed by: INTERNAL MEDICINE

## 2021-04-07 PROCEDURE — 80053 COMPREHEN METABOLIC PANEL: CPT | Performed by: INTERNAL MEDICINE

## 2021-04-07 PROCEDURE — 99215 OFFICE O/P EST HI 40 MIN: CPT | Mod: S$PBB,,, | Performed by: NURSE PRACTITIONER

## 2021-04-07 PROCEDURE — 99214 OFFICE O/P EST MOD 30 MIN: CPT | Mod: PBBFAC | Performed by: INTERNAL MEDICINE

## 2021-04-07 PROCEDURE — 99999 PR PBB SHADOW E&M-EST. PATIENT-LVL I: ICD-10-PCS | Mod: PBBFAC,,, | Performed by: NURSE PRACTITIONER

## 2021-04-07 PROCEDURE — 99999 PR PBB SHADOW E&M-EST. PATIENT-LVL IV: CPT | Mod: PBBFAC,,, | Performed by: INTERNAL MEDICINE

## 2021-04-07 PROCEDURE — 85025 COMPLETE CBC W/AUTO DIFF WBC: CPT | Performed by: INTERNAL MEDICINE

## 2021-04-07 PROCEDURE — 99211 OFF/OP EST MAY X REQ PHY/QHP: CPT | Mod: PBBFAC,27 | Performed by: NURSE PRACTITIONER

## 2021-04-07 PROCEDURE — 99214 PR OFFICE/OUTPT VISIT, EST, LEVL IV, 30-39 MIN: ICD-10-PCS | Mod: S$PBB,,, | Performed by: INTERNAL MEDICINE

## 2021-04-07 PROCEDURE — 99214 OFFICE O/P EST MOD 30 MIN: CPT | Mod: S$PBB,,, | Performed by: INTERNAL MEDICINE

## 2021-04-07 PROCEDURE — 99999 PR PBB SHADOW E&M-EST. PATIENT-LVL IV: ICD-10-PCS | Mod: PBBFAC,,, | Performed by: INTERNAL MEDICINE

## 2021-04-07 PROCEDURE — 99999 PR PBB SHADOW E&M-EST. PATIENT-LVL I: CPT | Mod: PBBFAC,,, | Performed by: NURSE PRACTITIONER

## 2021-04-07 PROCEDURE — 99215 PR OFFICE/OUTPT VISIT, EST, LEVL V, 40-54 MIN: ICD-10-PCS | Mod: S$PBB,,, | Performed by: NURSE PRACTITIONER

## 2021-04-07 RX ORDER — POLYETHYLENE GLYCOL 3350 17 G/17G
17 POWDER, FOR SOLUTION ORAL 2 TIMES DAILY PRN
Qty: 30 EACH | Refills: 6 | Status: SHIPPED | OUTPATIENT
Start: 2021-04-07

## 2021-04-07 RX ORDER — SODIUM CHLORIDE 0.9 % (FLUSH) 0.9 %
10 SYRINGE (ML) INJECTION
Status: CANCELLED | OUTPATIENT
Start: 2021-04-29

## 2021-04-07 RX ORDER — OXYCODONE HYDROCHLORIDE 30 MG/1
30 TABLET ORAL EVERY 4 HOURS PRN
Qty: 90 TABLET | Refills: 0 | Status: SHIPPED | OUTPATIENT
Start: 2021-04-07 | End: 2021-05-03 | Stop reason: SDUPTHER

## 2021-04-07 RX ORDER — TALC
3 POWDER (GRAM) TOPICAL NIGHTLY
Qty: 30 TABLET | Refills: 3 | Status: SHIPPED | OUTPATIENT
Start: 2021-04-07

## 2021-04-07 RX ORDER — ONDANSETRON 4 MG/1
4 TABLET, FILM COATED ORAL EVERY 6 HOURS PRN
Qty: 90 TABLET | Refills: 6 | Status: SHIPPED | OUTPATIENT
Start: 2021-04-07

## 2021-04-07 RX ORDER — HEPARIN 100 UNIT/ML
500 SYRINGE INTRAVENOUS
Status: CANCELLED | OUTPATIENT
Start: 2021-04-29

## 2021-04-07 RX ORDER — ALBUTEROL SULFATE 90 UG/1
2 AEROSOL, METERED RESPIRATORY (INHALATION) EVERY 6 HOURS PRN
Qty: 18 G | Refills: 11 | Status: SHIPPED | OUTPATIENT
Start: 2021-04-07

## 2021-04-07 RX ORDER — AMOXICILLIN 250 MG
2 CAPSULE ORAL DAILY
Qty: 60 TABLET | Refills: 6 | Status: SHIPPED | OUTPATIENT
Start: 2021-04-07

## 2021-04-07 RX ORDER — MORPHINE SULFATE 100 MG/1
100 TABLET, FILM COATED, EXTENDED RELEASE ORAL EVERY 8 HOURS
Qty: 90 TABLET | Refills: 0 | Status: SHIPPED | OUTPATIENT
Start: 2021-04-07 | End: 2021-04-13 | Stop reason: SDUPTHER

## 2021-04-07 RX ORDER — FAMOTIDINE 40 MG/1
40 TABLET, FILM COATED ORAL NIGHTLY
Qty: 30 TABLET | Refills: 1 | Status: SHIPPED | OUTPATIENT
Start: 2021-04-07 | End: 2022-04-07

## 2021-04-07 RX ORDER — MULTIVIT WITH MINERALS/HERBS
1 TABLET ORAL DAILY
COMMUNITY

## 2021-04-07 RX ORDER — BENZONATATE 200 MG/1
200 CAPSULE ORAL 3 TIMES DAILY PRN
Qty: 90 CAPSULE | Refills: 3 | Status: SHIPPED | OUTPATIENT
Start: 2021-04-07 | End: 2021-04-14

## 2021-04-13 DIAGNOSIS — C34.91 ADENOCARCINOMA, LUNG, RIGHT: ICD-10-CM

## 2021-04-13 RX ORDER — MORPHINE SULFATE 100 MG/1
100 TABLET, FILM COATED, EXTENDED RELEASE ORAL EVERY 8 HOURS
Qty: 120 TABLET | Refills: 0 | Status: SHIPPED | OUTPATIENT
Start: 2021-04-13

## 2021-04-15 DIAGNOSIS — I25.119 CORONARY ARTERY DISEASE INVOLVING NATIVE CORONARY ARTERY OF NATIVE HEART WITH ANGINA PECTORIS: ICD-10-CM

## 2021-04-15 DIAGNOSIS — E11.9 TYPE 2 DIABETES MELLITUS WITHOUT COMPLICATION, WITHOUT LONG-TERM CURRENT USE OF INSULIN: ICD-10-CM

## 2021-04-15 DIAGNOSIS — E78.5 DYSLIPIDEMIA: ICD-10-CM

## 2021-04-15 RX ORDER — BLOOD-GLUCOSE METER
EACH MISCELLANEOUS
Qty: 1 EACH | Refills: 0 | Status: SHIPPED | OUTPATIENT
Start: 2021-04-15

## 2021-04-15 RX ORDER — ATORVASTATIN CALCIUM 40 MG/1
TABLET, FILM COATED ORAL
Qty: 90 TABLET | Refills: 3 | OUTPATIENT
Start: 2021-04-15

## 2021-04-15 RX ORDER — ATORVASTATIN CALCIUM 80 MG/1
80 TABLET, FILM COATED ORAL DAILY
Qty: 90 TABLET | Refills: 3 | Status: SHIPPED | OUTPATIENT
Start: 2021-04-15 | End: 2021-04-20

## 2021-04-16 ENCOUNTER — PATIENT MESSAGE (OUTPATIENT)
Dept: RESEARCH | Facility: HOSPITAL | Age: 56
End: 2021-04-16

## 2021-04-19 DIAGNOSIS — E78.5 DYSLIPIDEMIA: ICD-10-CM

## 2021-04-20 DIAGNOSIS — E87.6 HYPOKALEMIA: ICD-10-CM

## 2021-04-20 RX ORDER — ATORVASTATIN CALCIUM 80 MG/1
TABLET, FILM COATED ORAL
Qty: 90 TABLET | Refills: 3 | Status: SHIPPED | OUTPATIENT
Start: 2021-04-20

## 2021-04-20 RX ORDER — POTASSIUM CHLORIDE 20 MEQ/1
40 TABLET, EXTENDED RELEASE ORAL DAILY
Qty: 30 TABLET | Refills: 11 | Status: SHIPPED | OUTPATIENT
Start: 2021-04-20 | End: 2021-04-28 | Stop reason: SDUPTHER

## 2021-04-23 ENCOUNTER — HOSPITAL ENCOUNTER (OUTPATIENT)
Dept: RADIOLOGY | Facility: HOSPITAL | Age: 56
Discharge: HOME | End: 2021-04-23
Attending: INTERNAL MEDICINE
Payer: MEDICAID

## 2021-04-23 DIAGNOSIS — C34.91 ADENOCARCINOMA, LUNG, RIGHT: ICD-10-CM

## 2021-04-23 PROCEDURE — 25500020 PHARM REV CODE 255: Performed by: INTERNAL MEDICINE

## 2021-04-23 PROCEDURE — 74177 CT ABD & PELVIS W/CONTRAST: CPT | Mod: 26,,, | Performed by: RADIOLOGY

## 2021-04-23 PROCEDURE — 71260 CT THORAX DX C+: CPT | Mod: TC

## 2021-04-23 PROCEDURE — 71260 CT CHEST ABDOMEN PELVIS WITH CONTRAST (XPD): ICD-10-PCS | Mod: 26,,, | Performed by: RADIOLOGY

## 2021-04-23 PROCEDURE — 71260 CT THORAX DX C+: CPT | Mod: 26,,, | Performed by: RADIOLOGY

## 2021-04-23 PROCEDURE — 74177 CT CHEST ABDOMEN PELVIS WITH CONTRAST (XPD): ICD-10-PCS | Mod: 26,,, | Performed by: RADIOLOGY

## 2021-04-23 PROCEDURE — 74177 CT ABD & PELVIS W/CONTRAST: CPT | Mod: TC

## 2021-04-23 RX ADMIN — IOHEXOL 15 ML: 300 INJECTION, SOLUTION INTRAVENOUS at 06:04

## 2021-04-23 RX ADMIN — IOHEXOL 75 ML: 350 INJECTION, SOLUTION INTRAVENOUS at 06:04

## 2021-04-28 DIAGNOSIS — E87.6 HYPOKALEMIA: ICD-10-CM

## 2021-04-28 RX ORDER — POTASSIUM CHLORIDE 20 MEQ/1
40 TABLET, EXTENDED RELEASE ORAL DAILY
Qty: 60 TABLET | Refills: 2 | Status: SHIPPED | OUTPATIENT
Start: 2021-04-28 | End: 2022-04-28

## 2021-05-01 DIAGNOSIS — R52 PAIN MANAGEMENT: ICD-10-CM

## 2021-05-01 DIAGNOSIS — G89.3 CANCER RELATED PAIN: ICD-10-CM

## 2021-05-01 DIAGNOSIS — M79.2 NEUROPATHIC PAIN OF SHOULDER, RIGHT: ICD-10-CM

## 2021-05-01 RX ORDER — GABAPENTIN 600 MG/1
600 TABLET ORAL 3 TIMES DAILY
Qty: 90 TABLET | Refills: 0 | Status: SHIPPED | OUTPATIENT
Start: 2021-05-01 | End: 2021-06-04 | Stop reason: SDUPTHER

## 2021-05-03 ENCOUNTER — TELEPHONE (OUTPATIENT)
Dept: HEMATOLOGY/ONCOLOGY | Facility: CLINIC | Age: 56
End: 2021-05-03

## 2021-05-03 ENCOUNTER — OFFICE VISIT (OUTPATIENT)
Dept: HEMATOLOGY/ONCOLOGY | Facility: CLINIC | Age: 56
End: 2021-05-03
Payer: MEDICAID

## 2021-05-03 ENCOUNTER — LAB VISIT (OUTPATIENT)
Dept: LAB | Facility: HOSPITAL | Age: 56
End: 2021-05-03
Attending: INTERNAL MEDICINE
Payer: MEDICAID

## 2021-05-03 VITALS
BODY MASS INDEX: 20.41 KG/M2 | TEMPERATURE: 98 F | HEIGHT: 68 IN | WEIGHT: 134.69 LBS | SYSTOLIC BLOOD PRESSURE: 124 MMHG | DIASTOLIC BLOOD PRESSURE: 78 MMHG | HEART RATE: 96 BPM | OXYGEN SATURATION: 99 %

## 2021-05-03 DIAGNOSIS — R05.9 COUGH: ICD-10-CM

## 2021-05-03 DIAGNOSIS — Z86.73 HISTORY OF CVA (CEREBROVASCULAR ACCIDENT): ICD-10-CM

## 2021-05-03 DIAGNOSIS — T40.2X5A THERAPEUTIC OPIOID INDUCED CONSTIPATION: ICD-10-CM

## 2021-05-03 DIAGNOSIS — C34.91 ADENOCARCINOMA, LUNG, RIGHT: Primary | ICD-10-CM

## 2021-05-03 DIAGNOSIS — J98.4 LUNG DISEASE: Primary | ICD-10-CM

## 2021-05-03 DIAGNOSIS — R63.0 ANOREXIA: ICD-10-CM

## 2021-05-03 DIAGNOSIS — E11.9 TYPE 2 DIABETES MELLITUS WITHOUT COMPLICATION, WITHOUT LONG-TERM CURRENT USE OF INSULIN: ICD-10-CM

## 2021-05-03 DIAGNOSIS — K59.03 CONSTIPATION DUE TO OPIOID THERAPY: ICD-10-CM

## 2021-05-03 DIAGNOSIS — E78.5 DYSLIPIDEMIA: ICD-10-CM

## 2021-05-03 DIAGNOSIS — T40.2X5A CONSTIPATION DUE TO OPIOID THERAPY: ICD-10-CM

## 2021-05-03 DIAGNOSIS — E87.6 HYPOKALEMIA: ICD-10-CM

## 2021-05-03 DIAGNOSIS — K59.03 THERAPEUTIC OPIOID INDUCED CONSTIPATION: ICD-10-CM

## 2021-05-03 DIAGNOSIS — R52 PAIN MANAGEMENT: ICD-10-CM

## 2021-05-03 DIAGNOSIS — C34.91 ADENOCARCINOMA, LUNG, RIGHT: ICD-10-CM

## 2021-05-03 DIAGNOSIS — Z72.0 TOBACCO ABUSE: ICD-10-CM

## 2021-05-03 DIAGNOSIS — I10 ESSENTIAL HYPERTENSION: ICD-10-CM

## 2021-05-03 LAB
ALBUMIN SERPL BCP-MCNC: 2.9 G/DL (ref 3.5–5.2)
ALP SERPL-CCNC: 74 U/L (ref 55–135)
ALT SERPL W/O P-5'-P-CCNC: 5 U/L (ref 10–44)
ANION GAP SERPL CALC-SCNC: 8 MMOL/L (ref 8–16)
AST SERPL-CCNC: 10 U/L (ref 10–40)
BASOPHILS # BLD AUTO: 0.04 K/UL (ref 0–0.2)
BASOPHILS NFR BLD: 0.4 % (ref 0–1.9)
BILIRUB SERPL-MCNC: 0.3 MG/DL (ref 0.1–1)
BUN SERPL-MCNC: 15 MG/DL (ref 6–20)
CALCIUM SERPL-MCNC: 9.4 MG/DL (ref 8.7–10.5)
CHLORIDE SERPL-SCNC: 104 MMOL/L (ref 95–110)
CO2 SERPL-SCNC: 30 MMOL/L (ref 23–29)
CREAT SERPL-MCNC: 0.7 MG/DL (ref 0.5–1.4)
DIFFERENTIAL METHOD: ABNORMAL
EOSINOPHIL # BLD AUTO: 0.1 K/UL (ref 0–0.5)
EOSINOPHIL NFR BLD: 0.9 % (ref 0–8)
ERYTHROCYTE [DISTWIDTH] IN BLOOD BY AUTOMATED COUNT: 20.4 % (ref 11.5–14.5)
EST. GFR  (AFRICAN AMERICAN): >60 ML/MIN/1.73 M^2
EST. GFR  (NON AFRICAN AMERICAN): >60 ML/MIN/1.73 M^2
GLUCOSE SERPL-MCNC: 92 MG/DL (ref 70–110)
HCT VFR BLD AUTO: 34.8 % (ref 40–54)
HGB BLD-MCNC: 10.6 G/DL (ref 14–18)
IMM GRANULOCYTES # BLD AUTO: 0.02 K/UL (ref 0–0.04)
IMM GRANULOCYTES NFR BLD AUTO: 0.2 % (ref 0–0.5)
LYMPHOCYTES # BLD AUTO: 2.9 K/UL (ref 1–4.8)
LYMPHOCYTES NFR BLD: 28.7 % (ref 18–48)
MCH RBC QN AUTO: 24 PG (ref 27–31)
MCHC RBC AUTO-ENTMCNC: 30.5 G/DL (ref 32–36)
MCV RBC AUTO: 79 FL (ref 82–98)
MONOCYTES # BLD AUTO: 0.8 K/UL (ref 0.3–1)
MONOCYTES NFR BLD: 7.7 % (ref 4–15)
NEUTROPHILS # BLD AUTO: 6.3 K/UL (ref 1.8–7.7)
NEUTROPHILS NFR BLD: 62.1 % (ref 38–73)
NRBC BLD-RTO: 0 /100 WBC
PLATELET # BLD AUTO: 370 K/UL (ref 150–450)
PMV BLD AUTO: 9.2 FL (ref 9.2–12.9)
POTASSIUM SERPL-SCNC: 3.8 MMOL/L (ref 3.5–5.1)
PROT SERPL-MCNC: 6.9 G/DL (ref 6–8.4)
RBC # BLD AUTO: 4.41 M/UL (ref 4.6–6.2)
SODIUM SERPL-SCNC: 142 MMOL/L (ref 136–145)
WBC # BLD AUTO: 10.11 K/UL (ref 3.9–12.7)

## 2021-05-03 PROCEDURE — 99214 PR OFFICE/OUTPT VISIT, EST, LEVL IV, 30-39 MIN: ICD-10-PCS | Mod: S$PBB,,, | Performed by: INTERNAL MEDICINE

## 2021-05-03 PROCEDURE — 99999 PR PBB SHADOW E&M-EST. PATIENT-LVL V: CPT | Mod: PBBFAC,,, | Performed by: INTERNAL MEDICINE

## 2021-05-03 PROCEDURE — 80053 COMPREHEN METABOLIC PANEL: CPT | Performed by: INTERNAL MEDICINE

## 2021-05-03 PROCEDURE — 99999 PR PBB SHADOW E&M-EST. PATIENT-LVL V: ICD-10-PCS | Mod: PBBFAC,,, | Performed by: INTERNAL MEDICINE

## 2021-05-03 PROCEDURE — 36415 COLL VENOUS BLD VENIPUNCTURE: CPT | Performed by: INTERNAL MEDICINE

## 2021-05-03 PROCEDURE — 99215 OFFICE O/P EST HI 40 MIN: CPT | Mod: PBBFAC | Performed by: INTERNAL MEDICINE

## 2021-05-03 PROCEDURE — 99214 OFFICE O/P EST MOD 30 MIN: CPT | Mod: S$PBB,,, | Performed by: INTERNAL MEDICINE

## 2021-05-03 PROCEDURE — 85025 COMPLETE CBC W/AUTO DIFF WBC: CPT | Performed by: INTERNAL MEDICINE

## 2021-05-03 RX ORDER — TRAZODONE HYDROCHLORIDE 50 MG/1
50 TABLET ORAL NIGHTLY
COMMUNITY
Start: 2021-04-12

## 2021-05-04 ENCOUNTER — DOCUMENTATION ONLY (OUTPATIENT)
Dept: HEMATOLOGY/ONCOLOGY | Facility: CLINIC | Age: 56
End: 2021-05-04

## 2021-05-04 RX ORDER — OXYCODONE HYDROCHLORIDE 30 MG/1
30 TABLET ORAL EVERY 4 HOURS PRN
Qty: 90 TABLET | Refills: 0 | Status: SHIPPED | OUTPATIENT
Start: 2021-05-04 | End: 2021-06-04 | Stop reason: SDUPTHER

## 2021-05-04 RX ORDER — BENZONATATE 100 MG/1
100 CAPSULE ORAL 3 TIMES DAILY PRN
Qty: 60 CAPSULE | Refills: 3 | Status: SHIPPED | OUTPATIENT
Start: 2021-05-04 | End: 2021-05-14

## 2021-06-02 ENCOUNTER — TELEPHONE (OUTPATIENT)
Dept: SMOKING CESSATION | Facility: CLINIC | Age: 56
End: 2021-06-02

## 2021-06-04 ENCOUNTER — LAB VISIT (OUTPATIENT)
Dept: LAB | Facility: HOSPITAL | Age: 56
End: 2021-06-04
Attending: INTERNAL MEDICINE
Payer: MEDICAID

## 2021-06-04 ENCOUNTER — NURSE TRIAGE (OUTPATIENT)
Dept: ADMINISTRATIVE | Facility: CLINIC | Age: 56
End: 2021-06-04

## 2021-06-04 ENCOUNTER — HOSPITAL ENCOUNTER (OUTPATIENT)
Dept: RADIOLOGY | Facility: HOSPITAL | Age: 56
Discharge: HOME | End: 2021-06-04
Attending: INTERNAL MEDICINE
Payer: MEDICAID

## 2021-06-04 ENCOUNTER — PATIENT MESSAGE (OUTPATIENT)
Dept: PALLIATIVE MEDICINE | Facility: CLINIC | Age: 56
End: 2021-06-04

## 2021-06-04 DIAGNOSIS — R52 PAIN MANAGEMENT: ICD-10-CM

## 2021-06-04 DIAGNOSIS — M79.2 NEUROPATHIC PAIN OF SHOULDER, RIGHT: ICD-10-CM

## 2021-06-04 DIAGNOSIS — G89.3 CANCER RELATED PAIN: ICD-10-CM

## 2021-06-04 DIAGNOSIS — C34.91 ADENOCARCINOMA, LUNG, RIGHT: ICD-10-CM

## 2021-06-04 LAB
ALBUMIN SERPL BCP-MCNC: 2.7 G/DL (ref 3.5–5.2)
ALP SERPL-CCNC: 75 U/L (ref 55–135)
ALT SERPL W/O P-5'-P-CCNC: 7 U/L (ref 10–44)
ANION GAP SERPL CALC-SCNC: 9 MMOL/L (ref 8–16)
AST SERPL-CCNC: 11 U/L (ref 10–40)
BASOPHILS # BLD AUTO: 0.01 K/UL (ref 0–0.2)
BASOPHILS NFR BLD: 0.1 % (ref 0–1.9)
BILIRUB SERPL-MCNC: 0.3 MG/DL (ref 0.1–1)
BUN SERPL-MCNC: 11 MG/DL (ref 6–20)
CALCIUM SERPL-MCNC: 9.8 MG/DL (ref 8.7–10.5)
CHLORIDE SERPL-SCNC: 100 MMOL/L (ref 95–110)
CO2 SERPL-SCNC: 28 MMOL/L (ref 23–29)
CREAT SERPL-MCNC: 0.6 MG/DL (ref 0.5–1.4)
DIFFERENTIAL METHOD: ABNORMAL
EOSINOPHIL # BLD AUTO: 0.1 K/UL (ref 0–0.5)
EOSINOPHIL NFR BLD: 1.1 % (ref 0–8)
ERYTHROCYTE [DISTWIDTH] IN BLOOD BY AUTOMATED COUNT: 19.9 % (ref 11.5–14.5)
EST. GFR  (AFRICAN AMERICAN): >60 ML/MIN/1.73 M^2
EST. GFR  (NON AFRICAN AMERICAN): >60 ML/MIN/1.73 M^2
GLUCOSE SERPL-MCNC: 92 MG/DL (ref 70–110)
HCT VFR BLD AUTO: 31.9 % (ref 40–54)
HGB BLD-MCNC: 9.8 G/DL (ref 14–18)
IMM GRANULOCYTES # BLD AUTO: 0.04 K/UL (ref 0–0.04)
IMM GRANULOCYTES NFR BLD AUTO: 0.4 % (ref 0–0.5)
LYMPHOCYTES # BLD AUTO: 2.2 K/UL (ref 1–4.8)
LYMPHOCYTES NFR BLD: 19.8 % (ref 18–48)
MCH RBC QN AUTO: 24.4 PG (ref 27–31)
MCHC RBC AUTO-ENTMCNC: 30.7 G/DL (ref 32–36)
MCV RBC AUTO: 79 FL (ref 82–98)
MONOCYTES # BLD AUTO: 1.2 K/UL (ref 0.3–1)
MONOCYTES NFR BLD: 10.8 % (ref 4–15)
NEUTROPHILS # BLD AUTO: 7.7 K/UL (ref 1.8–7.7)
NEUTROPHILS NFR BLD: 67.8 % (ref 38–73)
NRBC BLD-RTO: 0 /100 WBC
PLATELET # BLD AUTO: 422 K/UL (ref 150–450)
PMV BLD AUTO: 8.8 FL (ref 9.2–12.9)
POTASSIUM SERPL-SCNC: 3.1 MMOL/L (ref 3.5–5.1)
PROT SERPL-MCNC: 6.5 G/DL (ref 6–8.4)
RBC # BLD AUTO: 4.02 M/UL (ref 4.6–6.2)
SODIUM SERPL-SCNC: 137 MMOL/L (ref 136–145)
WBC # BLD AUTO: 11.33 K/UL (ref 3.9–12.7)

## 2021-06-04 PROCEDURE — 71260 CT CHEST ABDOMEN PELVIS WITH CONTRAST (XPD): ICD-10-PCS | Mod: 26,,, | Performed by: RADIOLOGY

## 2021-06-04 PROCEDURE — 74177 CT ABD & PELVIS W/CONTRAST: CPT | Mod: TC

## 2021-06-04 PROCEDURE — 71260 CT THORAX DX C+: CPT | Mod: 26,,, | Performed by: RADIOLOGY

## 2021-06-04 PROCEDURE — 25500020 PHARM REV CODE 255: Performed by: INTERNAL MEDICINE

## 2021-06-04 PROCEDURE — 80053 COMPREHEN METABOLIC PANEL: CPT | Performed by: INTERNAL MEDICINE

## 2021-06-04 PROCEDURE — 74177 CT ABD & PELVIS W/CONTRAST: CPT | Mod: 26,,, | Performed by: RADIOLOGY

## 2021-06-04 PROCEDURE — 71260 CT THORAX DX C+: CPT | Mod: TC

## 2021-06-04 PROCEDURE — 85025 COMPLETE CBC W/AUTO DIFF WBC: CPT | Performed by: INTERNAL MEDICINE

## 2021-06-04 PROCEDURE — 36415 COLL VENOUS BLD VENIPUNCTURE: CPT | Performed by: INTERNAL MEDICINE

## 2021-06-04 PROCEDURE — 74177 CT CHEST ABDOMEN PELVIS WITH CONTRAST (XPD): ICD-10-PCS | Mod: 26,,, | Performed by: RADIOLOGY

## 2021-06-04 RX ORDER — OXYCODONE HYDROCHLORIDE 30 MG/1
30 TABLET ORAL EVERY 4 HOURS PRN
Qty: 90 TABLET | Refills: 0 | Status: SHIPPED | OUTPATIENT
Start: 2021-06-04

## 2021-06-04 RX ORDER — GABAPENTIN 600 MG/1
600 TABLET ORAL 3 TIMES DAILY
Qty: 90 TABLET | Refills: 0 | Status: SHIPPED | OUTPATIENT
Start: 2021-06-04

## 2021-06-04 RX ADMIN — IOHEXOL 15 ML: 300 INJECTION, SOLUTION INTRAVENOUS at 11:06

## 2021-06-04 RX ADMIN — IOHEXOL 75 ML: 350 INJECTION, SOLUTION INTRAVENOUS at 11:06

## 2021-06-08 ENCOUNTER — OFFICE VISIT (OUTPATIENT)
Dept: HEMATOLOGY/ONCOLOGY | Facility: CLINIC | Age: 56
End: 2021-06-08
Payer: MEDICAID

## 2021-06-08 VITALS
TEMPERATURE: 98 F | SYSTOLIC BLOOD PRESSURE: 119 MMHG | OXYGEN SATURATION: 98 % | BODY MASS INDEX: 20.48 KG/M2 | HEART RATE: 110 BPM | DIASTOLIC BLOOD PRESSURE: 80 MMHG | HEIGHT: 68 IN

## 2021-06-08 DIAGNOSIS — Z86.73 HISTORY OF CVA (CEREBROVASCULAR ACCIDENT): ICD-10-CM

## 2021-06-08 DIAGNOSIS — I10 ESSENTIAL HYPERTENSION: ICD-10-CM

## 2021-06-08 DIAGNOSIS — G89.3 CANCER RELATED PAIN: ICD-10-CM

## 2021-06-08 DIAGNOSIS — C34.91 ADENOCARCINOMA, LUNG, RIGHT: Primary | ICD-10-CM

## 2021-06-08 DIAGNOSIS — E11.9 TYPE 2 DIABETES MELLITUS WITHOUT COMPLICATION, WITHOUT LONG-TERM CURRENT USE OF INSULIN: ICD-10-CM

## 2021-06-08 DIAGNOSIS — R63.0 ANOREXIA: ICD-10-CM

## 2021-06-08 PROCEDURE — 99999 PR PBB SHADOW E&M-EST. PATIENT-LVL II: CPT | Mod: PBBFAC,,, | Performed by: INTERNAL MEDICINE

## 2021-06-08 PROCEDURE — 99215 OFFICE O/P EST HI 40 MIN: CPT | Mod: S$PBB,,, | Performed by: INTERNAL MEDICINE

## 2021-06-08 PROCEDURE — 99999 PR PBB SHADOW E&M-EST. PATIENT-LVL II: ICD-10-PCS | Mod: PBBFAC,,, | Performed by: INTERNAL MEDICINE

## 2021-06-08 PROCEDURE — 99215 PR OFFICE/OUTPT VISIT, EST, LEVL V, 40-54 MIN: ICD-10-PCS | Mod: S$PBB,,, | Performed by: INTERNAL MEDICINE

## 2021-06-08 PROCEDURE — 99212 OFFICE O/P EST SF 10 MIN: CPT | Mod: PBBFAC | Performed by: INTERNAL MEDICINE

## 2021-06-09 ENCOUNTER — TELEPHONE (OUTPATIENT)
Dept: HEMATOLOGY/ONCOLOGY | Facility: CLINIC | Age: 56
End: 2021-06-09

## 2021-08-04 ENCOUNTER — PATIENT MESSAGE (OUTPATIENT)
Dept: ADMINISTRATIVE | Facility: HOSPITAL | Age: 56
End: 2021-08-04

## 2021-11-22 NOTE — TELEPHONE ENCOUNTER
Patient had port inserted yesterday and today c/o right shoulder pain, 6-7/10 that started last night and is constant. He states when he moves it gets worse. Also c/o chest pain on the right side, 6/10. Care advice discussed. Patient will go to ED. Please contact caller directly to discuss any further care advice.     Reason for Disposition   Age > 40 and no obvious cause and pain even when not moving the arm (Exception: pain is clearly made worse by moving arm or bending neck)    Additional Information   Negative: Sounds like a life-threatening emergency to the triager   Negative: Chest pain   Negative: Difficulty breathing   Negative: Surgical incision symptoms and questions   Negative: [1] Discomfort (pain, burning or stinging) when passing urine AND [2] male   Negative: [1] Discomfort (pain, burning or stinging) when passing urine AND [2] female   Negative: Constipation   Negative: New or worsening leg (calf, thigh) pain   Negative: New or worsening leg swelling   Negative: Dizziness is severe, or persists > 24 hours after surgery   Negative: Pain, redness, swelling, or pus at IV Site   Negative: Symptoms arising from use of a urinary catheter ([place] or [place])   Negative: Cast problems or questions   Negative: Medication question   Negative: [1] Widespread rash AND [2] bright red, sunburn-like   Negative: Shock suspected (e.g., cold/pale/clammy skin, too weak to stand, low BP, rapid pulse)   Negative: Similar pain previously and it was from 'heart attack'   Negative: Similar pain previously and it was from 'angina' and not relieved by nitroglycerin   Negative: Sounds like a life-threatening emergency to the triager   Negative: Chest pain   Negative: Followed an injury to shoulder   Negative: Pain lasting > 5 minutes and pain also present in chest (Exception: pain is clearly made worse by movement)   Negative: Difficulty breathing or unusual sweating (e.g., sweating without  exertion)    Protocols used: SHOULDER PAIN-A-OH, POST-OP SYMPTOMS AND [id number]       no